# Patient Record
Sex: FEMALE | Race: WHITE | NOT HISPANIC OR LATINO | Employment: UNEMPLOYED | URBAN - METROPOLITAN AREA
[De-identification: names, ages, dates, MRNs, and addresses within clinical notes are randomized per-mention and may not be internally consistent; named-entity substitution may affect disease eponyms.]

---

## 2017-01-03 ENCOUNTER — OFFICE VISIT (OUTPATIENT)
Dept: ORTHOPEDICS | Facility: CLINIC | Age: 47
End: 2017-01-03
Payer: COMMERCIAL

## 2017-01-03 ENCOUNTER — HOSPITAL ENCOUNTER (OUTPATIENT)
Dept: RADIOLOGY | Facility: HOSPITAL | Age: 47
Discharge: HOME | End: 2017-01-03
Attending: ORTHOPAEDIC SURGERY
Payer: COMMERCIAL

## 2017-01-03 VITALS
SYSTOLIC BLOOD PRESSURE: 119 MMHG | BODY MASS INDEX: 21.16 KG/M2 | DIASTOLIC BLOOD PRESSURE: 83 MMHG | WEIGHT: 115 LBS | HEIGHT: 62 IN | HEART RATE: 68 BPM

## 2017-01-03 DIAGNOSIS — M79.671 RIGHT FOOT PAIN: ICD-10-CM

## 2017-01-03 DIAGNOSIS — M79.671 RIGHT FOOT PAIN: Primary | ICD-10-CM

## 2017-01-03 DIAGNOSIS — M76.821 POSTERIOR TIBIAL TENDINITIS OF RIGHT LOWER EXTREMITY: ICD-10-CM

## 2017-01-03 PROCEDURE — 73630 X-RAY EXAM OF FOOT: CPT | Mod: TC,PO,RT

## 2017-01-03 PROCEDURE — 99999 PR PBB SHADOW E&M-EST. PATIENT-LVL III: CPT | Mod: PBBFAC,,, | Performed by: ORTHOPAEDIC SURGERY

## 2017-01-03 PROCEDURE — 73630 X-RAY EXAM OF FOOT: CPT | Mod: 26,RT,, | Performed by: RADIOLOGY

## 2017-01-03 PROCEDURE — 99204 OFFICE O/P NEW MOD 45 MIN: CPT | Mod: S$GLB,,, | Performed by: ORTHOPAEDIC SURGERY

## 2017-01-04 ENCOUNTER — HOSPITAL ENCOUNTER (OUTPATIENT)
Dept: RADIOLOGY | Facility: HOSPITAL | Age: 47
Discharge: HOME | End: 2017-01-04
Attending: ORTHOPAEDIC SURGERY
Payer: COMMERCIAL

## 2017-01-04 DIAGNOSIS — M79.671 RIGHT FOOT PAIN: ICD-10-CM

## 2017-01-04 PROCEDURE — 73721 MRI JNT OF LWR EXTRE W/O DYE: CPT | Mod: TC,PO,RT

## 2017-01-04 PROCEDURE — 73721 MRI JNT OF LWR EXTRE W/O DYE: CPT | Mod: 26,RT,, | Performed by: RADIOLOGY

## 2017-01-04 NOTE — PROGRESS NOTES
HPI: Maria L Smyth is a 46 y.o. female who complains of right foot pain. She notes pain in her right foot in the area of the arch for the past year.  No history of injury or trauma. She does kick boxing and pilates for exercise. She has tried PT, custom orthotics and walking boot. The boot makes the pain worse. She has gotten to the point where she cannot exercise due to pain. Pt denies weakness, numbness, and tingling.       PAST MEDICAL/SURGICAL/FAMILY/SOCIAL/ HISTORY: REVIEWED    ALLERGIES/MEDICATIONS: REVIEWED       Review of Systems:   Answers for HPI/ROS submitted by the patient on 1/3/2017   Leg pain  unexpected weight change: No  appetite change : No  sleep disturbance: No  IMMUNOCOMPROMISED: No  nervous/ anxious: No  dysphoric mood: No  rash: No  visual disturbance: No  eye redness: No  eye pain: No  ear pain: No  tinnitus: No  hearing loss: No  sinus pressure : No  nosebleeds: No  enviro allergies: No  food allergies: Yes  cough: No  shortness of breath: No  sweating: No  dysuria: No  frequency: No  difficulty urinating: No  hematuria: No  painful intercourse: No  chest pain: No  palpitations: No  nausea: No  vomiting: No  diarrhea: No  blood in stool: No  constipation: No  headaches: No  dizziness: No  numbness: No  seizures: No  joint swelling: No  myalgia: No  weakness: No  back pain: No  Pain Chronicity: recurrent  History of trauma: Yes  Onset: more than 1 year ago  Frequency: daily  Progression since onset: waxing and waning  Injury mechanism: jumping  injury location: exercising  pain- numeric: 3/10  pain location: right foot  pain quality: dull  Radiating Pain: No  Aggravating factors: exercise  fever: No  inability to bear weight: Yes  itching: No  joint locking: No  limited range of motion: No  stiffness: No  tingling: Yes  Treatments tried: brace/corset, cold, heat, rest  physical therapy: ineffective  Improvement on treatment: mild      PHYSICAL EXAM:  Vitals:    01/03/17 1340   BP: 119/83  "  Pulse: 68     Ht Readings from Last 1 Encounters:   01/03/17 5' 2" (1.575 m)     Wt Readings from Last 1 Encounters:   01/03/17 52.2 kg (115 lb)         GENERAL: Well developed, well nourished, no acute distress.  SKIN: Skin is intact. No atrophy, abrasions or lesions are noted.   Neurological: Normal mental status. Appropriate and conversant. Alert and oriented x 3.  GAIT: Walks with non-antalgic gait.    Right lower extremity:  2+ dorsalis pedis pulse.  Capillary refill < 3 seconds.  Normal range of motion tibiotalar and subtalar joints. Moderate pes planovalgus.  5/5 strength EHL, FHL, tibialis anterior, gastrocsoleus, tibialis posterior and peroneals. Sensation to light touch intact sural, saphenous, superficial peroneal and deep peroneal nerves. No swelling, ecchymosis or deformity. No lymphadenopathy, no masses or tumors palpated.  She is  tender to palpation at the insertion of the posterior tibial tendon. She is able to perform single heel raise.     Left lower extremity: 2+ dorsalis pedis pulse.  Capillary refill < 3 seconds.  Normal range of motion tibiotalar and subtalar joints. Moderate pes planovalgus.   5/5 strength EHL, FHL, tibialis anterior, gastrocsoleus, tibialis posterior and peroneals. Sensation to light touch intact sural, saphenous, superficial peroneal and deep peroneal nerves. No swelling, ecchymosis or deformity. No lymphadenopathy, no masses or tumors palpated.        XRAYS:   3 views of right foot obtained and reviewed today reveal pes planovalgus. No evidence of fractures or dislocations.       ASSESSMENT:        Encounter Diagnosis   Name Primary?    Posterior tibial tendinitis of right lower extremity         PLAN:  I spent 20 minutes in consulation with the patient today. More than half the time was spent counseling the patient on her condition and the options for operative versus non-operative care.     We performed a custom orthotic/brace adjustment, fitting and training with the " patient today. The patient demonstrated understanding and proper care. This was performed for 15 minutes.  Lace up ankle brace was given.   Rest and elevate the affected painful area.  Apply cold compresses intermittently as needed.  As pain recedes, begin normal activities slowly as tolerated.  Continue PT. F/u 6 weeks. If no improvement will obtain MRI and discuss flatfoot reconstruction in further detail.

## 2017-01-05 ENCOUNTER — TELEPHONE (OUTPATIENT)
Dept: ORTHOPEDICS | Facility: CLINIC | Age: 47
End: 2017-01-05

## 2017-01-05 NOTE — TELEPHONE ENCOUNTER
Spoke to patient and advised that Dr. Alanis reviewed her MRI and stated that she has tendonitis. Advised patient that she may continue to walk and do therapy with her cam walker boot. Patient to follow up at follow up appt. Patient stated understanding.

## 2017-05-08 ENCOUNTER — PATIENT MESSAGE (OUTPATIENT)
Dept: FAMILY MEDICINE | Facility: CLINIC | Age: 47
End: 2017-05-08

## 2017-05-11 RX ORDER — RIZATRIPTAN BENZOATE 10 MG/1
10 TABLET ORAL ONCE AS NEEDED
Qty: 12 TABLET | Refills: 6 | Status: SHIPPED | OUTPATIENT
Start: 2017-05-11 | End: 2018-05-25 | Stop reason: SDUPTHER

## 2017-09-05 ENCOUNTER — PATIENT MESSAGE (OUTPATIENT)
Dept: FAMILY MEDICINE | Facility: CLINIC | Age: 47
End: 2017-09-05

## 2017-09-05 RX ORDER — MONTELUKAST SODIUM 10 MG/1
10 TABLET ORAL NIGHTLY
Qty: 30 TABLET | Refills: 12 | Status: SHIPPED | OUTPATIENT
Start: 2017-09-05 | End: 2018-05-25 | Stop reason: SDUPTHER

## 2017-09-29 ENCOUNTER — OFFICE VISIT (OUTPATIENT)
Dept: OBSTETRICS AND GYNECOLOGY | Facility: CLINIC | Age: 47
End: 2017-09-29
Payer: COMMERCIAL

## 2017-09-29 ENCOUNTER — OFFICE VISIT (OUTPATIENT)
Dept: OPTOMETRY | Facility: CLINIC | Age: 47
End: 2017-09-29
Payer: COMMERCIAL

## 2017-09-29 VITALS
BODY MASS INDEX: 22.28 KG/M2 | DIASTOLIC BLOOD PRESSURE: 60 MMHG | HEIGHT: 62 IN | SYSTOLIC BLOOD PRESSURE: 110 MMHG | WEIGHT: 121.06 LBS

## 2017-09-29 DIAGNOSIS — Z01.00 ENCOUNTER FOR ROUTINE EYE AND VISION EXAMINATION: Primary | ICD-10-CM

## 2017-09-29 DIAGNOSIS — H52.203 MYOPIA WITH ASTIGMATISM AND PRESBYOPIA, BILATERAL: ICD-10-CM

## 2017-09-29 DIAGNOSIS — Z01.419 WELL FEMALE EXAM WITH ROUTINE GYNECOLOGICAL EXAM: Primary | ICD-10-CM

## 2017-09-29 DIAGNOSIS — Z46.0 CONTACT LENS/GLASSES FITTING: Primary | ICD-10-CM

## 2017-09-29 DIAGNOSIS — Z12.39 BREAST CANCER SCREENING: ICD-10-CM

## 2017-09-29 DIAGNOSIS — H52.13 MYOPIA WITH ASTIGMATISM AND PRESBYOPIA, BILATERAL: ICD-10-CM

## 2017-09-29 DIAGNOSIS — Z46.0 CONTACT LENS/GLASSES FITTING: ICD-10-CM

## 2017-09-29 DIAGNOSIS — H52.4 MYOPIA WITH ASTIGMATISM AND PRESBYOPIA, BILATERAL: ICD-10-CM

## 2017-09-29 PROCEDURE — 99999 PR PBB SHADOW E&M-EST. PATIENT-LVL III: CPT | Mod: PBBFAC,,, | Performed by: OBSTETRICS & GYNECOLOGY

## 2017-09-29 PROCEDURE — 99396 PREV VISIT EST AGE 40-64: CPT | Mod: S$GLB,,, | Performed by: OBSTETRICS & GYNECOLOGY

## 2017-09-29 PROCEDURE — 92014 COMPRE OPH EXAM EST PT 1/>: CPT | Mod: S$GLB,,, | Performed by: OPTOMETRIST

## 2017-09-29 PROCEDURE — 99999 PR PBB SHADOW E&M-EST. PATIENT-LVL II: CPT | Mod: PBBFAC,,, | Performed by: OPTOMETRIST

## 2017-09-29 PROCEDURE — 92015 DETERMINE REFRACTIVE STATE: CPT | Mod: S$GLB,,, | Performed by: OPTOMETRIST

## 2017-09-29 PROCEDURE — 99499 UNLISTED E&M SERVICE: CPT | Mod: S$GLB,,, | Performed by: OPTOMETRIST

## 2017-09-29 NOTE — PROGRESS NOTES
SUBJECTIVE:   46 y.o. female   for routine gyn exam. Patient's last menstrual period was 09/15/2017 (approximate)..  She reports painful periods and fatigue.         Past Medical History:   Diagnosis Date    Abnormal Pap smear     pt states that she had a biopsy that resulted WNL.    Abnormal Pap smear of cervix     Allergic conjunctivitis     ALLERGIC RHINITIS     Atopic dermatitis     Food allergy 2012    Immunotherapy     Migraine headache      Past Surgical History:   Procedure Laterality Date    SHOULDER ARTHROSCOPY  2011    right rotator cuff repair    SINUS SURGERY      TONSILLECTOMY  childhood     Social History     Social History    Marital status:      Spouse name: N/A    Number of children: N/A    Years of education: N/A     Occupational History    Not on file.     Social History Main Topics    Smoking status: Never Smoker    Smokeless tobacco: Never Used    Alcohol use Yes      Comment: social    Drug use: No    Sexual activity: Yes     Partners: Male     Birth control/ protection: None, Partner-Vasectomy     Other Topics Concern    Are You Pregnant Or Think You May Be? No    Breast-Feeding No     Social History Narrative    Originally from [place].     Family History   Problem Relation Age of Onset    Allergic rhinitis Son     Allergic rhinitis Son     Asthma Son     Atopy Son     Eczema Son     Cancer Maternal Grandmother     Cancer Maternal Aunt 40     adenoca lung, breast (Non-smoker)    Diabetes Maternal Aunt     Heart disease Mother 60     s/p cabg    Retinitis pigmentosa Mother     Heart disease Father 62     s/p cabg    Cancer Father      bladder ca (+tob)    Diabetes Father      pre-diabetes    Cancer Maternal Grandfather      NHL    Immunodeficiency Neg Hx     Urticaria Neg Hx     Angioedema Neg Hx     Allergies Neg Hx     Rhinitis Neg Hx     Breast cancer Neg Hx     Ovarian cancer Neg Hx     Colon cancer Neg Hx      Melanoma Neg Hx      OB History    Para Term  AB Living   3 3 3     3   SAB TAB Ectopic Multiple Live Births           3      # Outcome Date GA Lbr Clovis/2nd Weight Sex Delivery Anes PTL Lv   3 Term      Vag-Spont  N AMY   2 Term      Vag-Spont  N AMY   1 Term      Vag-Spont  N AMY            Current Outpatient Prescriptions   Medication Sig Dispense Refill    B-complex with vitamin C (Z-BEC OR EQUIV) tablet Take 1 tablet by mouth once daily.      cetirizine (ZYRTEC) 10 MG tablet Take 1 tablet by mouth.      fish oil-omega-3 fatty acids 300-1,000 mg capsule Take 2 g by mouth once daily.      montelukast (SINGULAIR) 10 mg tablet Take 1 tablet (10 mg total) by mouth nightly. Every day 30 tablet 12    QNASL 80 mcg/actuation HFAA       vitamin D 1000 units Tab Take 185 mg by mouth once daily.      epinephrine (EPIPEN) 0.3 mg/0.3 mL (1:1,000) AtIn Inject 0.3 mLs (0.3 mg total) into the muscle once. 2 Device 1    rizatriptan (MAXALT) 10 MG tablet Take 1 tablet (10 mg total) by mouth once as needed for Migraine. 12 tablet 6     No current facility-administered medications for this visit.      Allergies: Macrobid  [nitrofurantoin monohyd/m-cryst]; Corn; Effexor  [venlafaxine]; Eggs [egg derived]; Soy; Wheat; and Doxycycline     ROS:  Constitutional: no weight loss, weight gain, fever,+ fatigue  Eyes:  No vision changes, glasses/contacts  ENT/Mouth: No ulcers, sinus problems, ears ringing, headache  Cardiovascular: No inability to lie flat, chest pain, exercise intolerance, swelling, heart palpitations  Respiratory: No wheezing, coughing blood, shortness of breath, or cough  Gastrointestinal: No diarrhea, bloody stool, nausea/vomiting, constipation, gas, hemorrhoids  Genitourinary: No blood in urine, painful urination, urgency of urination, frequency of urination, incomplete emptying, incontinence, abnormal bleeding, +painful periods, heavy periods, vaginal discharge, vaginal odor, painful intercourse,  "sexual problems, bleeding after intercourse.  Musculoskeletal: No muscle weakness  Skin/Breast: No painful breasts, nipple discharge, masses, rash, ulcers  Neurological: No passing out, seizures, numbness, headache  Endocrine: No diabetes, hypothyroid, hyperthyroid, hot flashes, hair loss, abnormal hair growth, ance  Psychiatric: No depression, crying  Hematologic: No bruises, bleeding, swollen lymph nodes, anemia.      OBJECTIVE:   The patient appears well, alert, oriented x 3, in no distress.  /60 (BP Location: Left arm, Patient Position: Sitting, BP Method: Medium (Automatic))   Ht 5' 2" (1.575 m)   Wt 54.9 kg (121 lb 0.5 oz)   LMP 09/15/2017 (Approximate)   BMI 22.14 kg/m²   NECK: no thyromegaly, trachea midline  SKIN: no acne, striae, hirsutism  CHEST: CTAB  CV: RRR  BREAST EXAM: breasts appear normal, no suspicious masses, no skin or nipple changes or axillary nodes  ABDOMEN: no hernias, masses, or hepatosplenomegaly  GENITALIA: normal external genitalia, no erythema, no discharge  URETHRA: normal urethra, normal urethral meatus  VAGINA: Normal  CERVIX: no lesions or cervical motion tenderness  UTERUS: normal size, contour, position, consistency, mobility, non-tender  ADNEXA: no mass, fullness, tenderness      ASSESSMENT:   1. Well female exam with routine gynecological exam     2. Breast cancer screening  Mammo Digital Screening Bilat with Tomosynthesis CAD       PLAN:   Orders Placed This Encounter    Mammo Digital Screening Bilat with Tomosynthesis CAD     Discussed sx and mgt options.  Return to clinic in 1 year  "

## 2017-09-29 NOTE — PROGRESS NOTES
HPI     Annual Exam    Additional comments: DLE 10-14 (mars)    needs updated specs & clrx           Blurred Vision    Additional comments: slight decrease at both near & distance ---   interested in trying monovision           Comments   Agree above  Notes [facility] seems reduced at near / distance  Readers w/ cl's   No other new issues         Last edited by BARBARA Valadez, OD on 9/29/2017  4:33 PM. (History)        ROS     Positive for: Eyes    Negative for: Constitutional, Gastrointestinal, Neurological, Skin,   Genitourinary, Musculoskeletal, HENT, Endocrine, Cardiovascular,   Respiratory, Psychiatric, Allergic/Imm, Heme/Lymph    Last edited by BARBARA Valadez, OD on 9/29/2017  2:28 PM. (History)        Assessment /Plan     For exam results, see Encounter Report.    Encounter for routine eye and vision examination    Myopia with astigmatism and presbyopia, bilateral    Contact lens/glasses fitting      1. Ocular health appears wnl  Pt defers IOP/ DFE  2. Updated specs rx, gave copy  3. Updated clrx  Stay with Acuvue 1 day lenses  Will trial monovision by wearing lens OD only  Cautions driving w/ mono, charles at night    DAILY WEAR CONTACT LENSES  Continue with Daily Wear of contact lenses, up to all waking hours.  Continue with approved contact lens disinfection / rewetting drops as discussed.  Dispose of lenses daily.  Stop wearing your lenses and call our office if redness, blurred vision, or pain persists more than 12 hours.  We recommend an annual eye exam for contact lens patients.      Discussed and educated patient on current findings /plan.  RTC 1 year, prn if any changes / issues

## 2017-09-29 NOTE — PROGRESS NOTES
Assessment /Plan     For exam results, see Encounter Report.    Contact lens/glasses fitting      NC fitting appt

## 2017-10-26 ENCOUNTER — HOSPITAL ENCOUNTER (OUTPATIENT)
Dept: RADIOLOGY | Facility: HOSPITAL | Age: 47
Discharge: HOME | End: 2017-10-26
Attending: OBSTETRICS & GYNECOLOGY
Payer: COMMERCIAL

## 2017-10-26 DIAGNOSIS — Z12.39 BREAST CANCER SCREENING: ICD-10-CM

## 2017-10-26 PROCEDURE — 77067 SCR MAMMO BI INCL CAD: CPT | Mod: TC

## 2017-10-26 PROCEDURE — 77063 BREAST TOMOSYNTHESIS BI: CPT | Mod: 26,,, | Performed by: RADIOLOGY

## 2017-10-26 PROCEDURE — 77067 SCR MAMMO BI INCL CAD: CPT | Mod: 26,,, | Performed by: RADIOLOGY

## 2018-02-01 ENCOUNTER — TELEPHONE (OUTPATIENT)
Dept: FAMILY MEDICINE | Facility: CLINIC | Age: 48
End: 2018-02-01

## 2018-02-01 RX ORDER — OSELTAMIVIR PHOSPHATE 75 MG/1
75 CAPSULE ORAL DAILY
Qty: 10 CAPSULE | Refills: 0 | Status: SHIPPED | OUTPATIENT
Start: 2018-02-01 | End: 2018-02-11

## 2018-05-25 ENCOUNTER — TELEPHONE (OUTPATIENT)
Dept: ALLERGY | Facility: CLINIC | Age: 48
End: 2018-05-25

## 2018-05-25 RX ORDER — EPINEPHRINE 0.3 MG/.3ML
1 INJECTION, SOLUTION INTRAMUSCULAR ONCE
Qty: 2 EACH | Refills: 1 | Status: SHIPPED | OUTPATIENT
Start: 2018-05-25 | End: 2020-07-02 | Stop reason: SDUPTHER

## 2018-05-25 NOTE — TELEPHONE ENCOUNTER
I have done prescription for Auvi q but please let her know if I am filling any meds for her she needs to be seen annually, has been 4 years, otherwise she can have her PCP fill epi

## 2018-05-28 RX ORDER — MONTELUKAST SODIUM 10 MG/1
10 TABLET ORAL NIGHTLY
Qty: 30 TABLET | Refills: 0 | Status: SHIPPED | OUTPATIENT
Start: 2018-05-28 | End: 2018-09-20 | Stop reason: SDUPTHER

## 2018-05-28 RX ORDER — RIZATRIPTAN BENZOATE 10 MG/1
10 TABLET ORAL ONCE AS NEEDED
Qty: 12 TABLET | Refills: 0 | Status: SHIPPED | OUTPATIENT
Start: 2018-05-28 | End: 2018-11-14 | Stop reason: SDUPTHER

## 2018-08-02 ENCOUNTER — LAB VISIT (OUTPATIENT)
Dept: LAB | Facility: HOSPITAL | Age: 48
End: 2018-08-02
Attending: INTERNAL MEDICINE
Payer: COMMERCIAL

## 2018-08-02 ENCOUNTER — OFFICE VISIT (OUTPATIENT)
Dept: FAMILY MEDICINE | Facility: CLINIC | Age: 48
End: 2018-08-02
Payer: COMMERCIAL

## 2018-08-02 VITALS
SYSTOLIC BLOOD PRESSURE: 122 MMHG | BODY MASS INDEX: 22.82 KG/M2 | WEIGHT: 124 LBS | DIASTOLIC BLOOD PRESSURE: 80 MMHG | HEIGHT: 62 IN | TEMPERATURE: 98 F | HEART RATE: 60 BPM | RESPIRATION RATE: 18 BRPM

## 2018-08-02 DIAGNOSIS — R19.7 DIARRHEA, UNSPECIFIED TYPE: ICD-10-CM

## 2018-08-02 DIAGNOSIS — R19.7 DIARRHEA, UNSPECIFIED TYPE: Primary | ICD-10-CM

## 2018-08-02 DIAGNOSIS — K58.0 IRRITABLE BOWEL SYNDROME WITH DIARRHEA: ICD-10-CM

## 2018-08-02 LAB
C DIFF GDH STL QL: NEGATIVE
C DIFF TOX A+B STL QL IA: NEGATIVE

## 2018-08-02 PROCEDURE — 3008F BODY MASS INDEX DOCD: CPT | Mod: CPTII,S$GLB,, | Performed by: NURSE PRACTITIONER

## 2018-08-02 PROCEDURE — 99214 OFFICE O/P EST MOD 30 MIN: CPT | Mod: S$GLB,,, | Performed by: NURSE PRACTITIONER

## 2018-08-02 PROCEDURE — 87209 SMEAR COMPLEX STAIN: CPT

## 2018-08-02 PROCEDURE — 87449 NOS EACH ORGANISM AG IA: CPT

## 2018-08-02 RX ORDER — DICYCLOMINE HYDROCHLORIDE 20 MG/1
20 TABLET ORAL
Qty: 28 TABLET | Refills: 0 | Status: SHIPPED | OUTPATIENT
Start: 2018-08-02 | End: 2018-09-01

## 2018-08-02 NOTE — PROGRESS NOTES
Subjective:       Patient ID: Maria L Smyth is a 47 y.o. female.    Chief Complaint: Abdominal Pain    HPI Over the past several weeks having Diarrhea, bloating, cramping. Got better then came back. Started eating meat again after not eating for a year. States she was diagnosed a year ago with IBS and she found that red meat was the irritant. States she has been eating fish/chicken only for the past year until a few weeks ago. Started back with small amount of red meat in her diet and shortly after she started with loose stools. States it varies from watery to soft. She has since stopped the red meat again but the diarrhea is persisting. She has  Mild nausea. Fatigue from not eating much. No blood in her stools.  Watery to soft. She is taking probiotics. No other family members sick. Has from 2-5 bowel movements a day, worse if she eats something. She has not noticed any fever. She denies going out of the country or being around anyone that has had C-diff. She denies any other concerns today. See ROS.     The following portion of the patients history was reviewed and updated as appropriate: allergies, current medications, past medical and surgical history. Past social history and problem list reviewed. Family PMH and Past social history reviewed. Tobacco, Illicit drug use reviewed.     Review of Systems   Constitutional: Positive for fatigue. Negative for fever.   HENT: Negative.    Respiratory: Negative for cough, shortness of breath and wheezing.    Cardiovascular: Negative for chest pain, palpitations and leg swelling.   Gastrointestinal: Positive for abdominal pain (cramping  at times.  bloated feeling), diarrhea and nausea. Negative for blood in stool and vomiting.   Genitourinary: Negative for difficulty urinating and flank pain.   Musculoskeletal: Negative.    Neurological: Negative for weakness and headaches.   Hematological: Negative for adenopathy.       Objective:     /80 (BP Location: Left arm,  "Patient Position: Sitting, BP Method: Medium (Manual))   Pulse 60   Temp 97.9 °F (36.6 °C) (Oral)   Resp 18   Ht 5' 2" (1.575 m)   Wt 56.2 kg (124 lb)   LMP 07/19/2018   BMI 22.68 kg/m²      Physical Exam     Constitutional: oriented to person, place, and time. well-developed and well-nourished.   Cardiovascular: Normal rate, regular rhythm and normal heart sounds.    Pulmonary/Chest: Effort normal and breath sounds normal. No respiratory distress. No wheezes.   Abdominal: Soft. Bowel sounds are normal. No distension. There is no tenderness.   Musculoskeletal: Normal range of motion. No edema.   Neurological: oriented to person, place, and time.   Skin: Skin is warm and dry. No rashes or lesions  Psychiatric: Normal mood and affect.Behavior is normal. Judgment and thought content normal.   Assessment:       1. Diarrhea, unspecified type    2. Irritable bowel syndrome with diarrhea        Plan:         Maria L was seen today for abdominal pain.    Diagnoses and all orders for this visit:    Diarrhea, unspecified type: will check stool samples.   -     Clostridium difficile EIA; Future  -     Stool Exam-Ova,Cysts,Parasites; Future    Irritable bowel syndrome with diarrhea: continue the probiotics, follow bland diet. Take bentyl for the next week. Will follow up with GI clinic if needed.     Other orders  -     dicyclomine (BENTYL) 20 mg tablet; Take 1 tablet (20 mg total) by mouth 4 (four) times daily before meals and nightly.    Continue current medication  Take medications only as prescribed  Healthy diet, exercise  Adequate rest  Adequate hydration  Avoid allergens  Avoid excessive caffeine  "

## 2018-08-03 LAB — O+P STL TRI STN: NORMAL

## 2018-08-14 ENCOUNTER — OFFICE VISIT (OUTPATIENT)
Dept: FAMILY MEDICINE | Facility: CLINIC | Age: 48
End: 2018-08-14
Payer: COMMERCIAL

## 2018-08-14 VITALS
WEIGHT: 124.63 LBS | OXYGEN SATURATION: 99 % | SYSTOLIC BLOOD PRESSURE: 120 MMHG | DIASTOLIC BLOOD PRESSURE: 66 MMHG | TEMPERATURE: 98 F | HEIGHT: 62 IN | HEART RATE: 62 BPM | BODY MASS INDEX: 22.93 KG/M2

## 2018-08-14 DIAGNOSIS — Z00.00 WELL ADULT EXAM: Primary | ICD-10-CM

## 2018-08-14 DIAGNOSIS — G43.009 MIGRAINE WITHOUT AURA AND WITHOUT STATUS MIGRAINOSUS, NOT INTRACTABLE: ICD-10-CM

## 2018-08-14 DIAGNOSIS — Z12.31 ENCOUNTER FOR SCREENING MAMMOGRAM FOR MALIGNANT NEOPLASM OF BREAST: ICD-10-CM

## 2018-08-14 DIAGNOSIS — E55.9 VITAMIN D DEFICIENCY: ICD-10-CM

## 2018-08-14 DIAGNOSIS — Z91.018 FOOD ALLERGY: ICD-10-CM

## 2018-08-14 DIAGNOSIS — R19.7 DIARRHEA, UNSPECIFIED TYPE: ICD-10-CM

## 2018-08-14 PROCEDURE — 99396 PREV VISIT EST AGE 40-64: CPT | Mod: S$GLB,,, | Performed by: INTERNAL MEDICINE

## 2018-08-14 NOTE — PROGRESS NOTES
Subjective:       Patient ID: Maria L Smyth is a 47 y.o. female.    Medication List with Changes/Refills   Current Medications    AUVI-Q 0.3 MG/0.3 ML ATIN    Inject 0.3 mLs (0.3 mg total) into the muscle once.    B-COMPLEX WITH VITAMIN C (Z-BEC OR EQUIV) TABLET    Take 1 tablet by mouth once daily.    CETIRIZINE (ZYRTEC) 10 MG TABLET    Take 1 tablet by mouth.    DICYCLOMINE (BENTYL) 20 MG TABLET    Take 1 tablet (20 mg total) by mouth 4 (four) times daily before meals and nightly.    FISH OIL-OMEGA-3 FATTY ACIDS 300-1,000 MG CAPSULE    Take 2 g by mouth once daily.    MONTELUKAST (SINGULAIR) 10 MG TABLET    Take 1 tablet (10 mg total) by mouth nightly    QNASL 80 MCG/ACTUATION HFAA        RIZATRIPTAN (MAXALT) 10 MG TABLET    Take 1 tablet (10 mg total) by mouth once as needed for Migraine.    VITAMIN D 1000 UNITS TAB    Take 185 mg by mouth once daily.       Chief Complaint: Annual Exam  She is here today for her wellness exam.      On 8/2/2018 she was seen for viral gastroenteritis with diarrhea and abdominal pain.  SHe says she is feeling better but continues with some loose stools.  She says some foods make this worse and she is trying to avoid. No vomiting. No nausea. No weight loss. No blood in stool. Abdominal pain is resolved but she has some mild bloating.     She has chronic environmental allergies that are controlled on nasal steroids, singulair and antihistamines.  She denies any active symptoms.      She has multiple food allergies and is on a very strict diet. She is now a vegetarian which has helped a lot with her chronic GI discomfort.  She is taking vitamin supplementation and probiotics.  She is no longer having reflux symptoms.     She has migraines that are triggered by sulfites. As long as she avoids dried fruit and red wine she can avoid her migraines. If she has a migraine she has good response to maxalt.     She eats healthy and exercises regularly.     Pap---4/2016 neg  Mammogram  "----10/2017  neg  Tdap----2/2016  Influenza vaccine-----annually    Review of Systems   Constitutional: Negative for appetite change, fatigue, fever and unexpected weight change.   HENT: Negative for congestion, ear pain, hearing loss, sore throat and trouble swallowing.    Eyes: Negative for pain and visual disturbance.   Respiratory: Negative for cough, chest tightness, shortness of breath and wheezing.    Cardiovascular: Negative for chest pain, palpitations and leg swelling.   Gastrointestinal: Positive for abdominal distention and diarrhea. Negative for abdominal pain, blood in stool, constipation, nausea and vomiting.   Endocrine: Negative for polyuria.   Genitourinary: Negative for dysuria and hematuria.   Musculoskeletal: Negative for arthralgias, back pain and myalgias.   Skin: Negative for rash.   Neurological: Negative for dizziness, weakness, numbness and headaches.   Hematological: Does not bruise/bleed easily.   Psychiatric/Behavioral: Negative for dysphoric mood, sleep disturbance and suicidal ideas. The patient is not nervous/anxious.        Objective:      Vitals:    08/14/18 1056   BP: 120/66   Pulse: 62   Temp: 98 °F (36.7 °C)   TempSrc: Oral   SpO2: 99%   Weight: 56.5 kg (124 lb 9.6 oz)   Height: 5' 2" (1.575 m)     Body mass index is 22.79 kg/m².  Physical Exam    General appearance: No acute distress, cooperative  Eyes: PERRL, EOMI, conjunctiva clear  Ears: normal external ear and pinna, tm clear without drainage, canals clear  Nose: Normal mucosa without drainage  Throat: no exudates or erythema, tonsils not enlarged  Mouth: no sores or lesions, moist mucous membranes, good dentition  Neck: FROM, soft, supple, no thyromegaly, no bruits  Lymph: no anterior or posterior cervical adenopathy  Heart::  Regular rate and rhythm, no murmur  Lung: Clear to ascultation bilaterally, no wheezing, no rales, no rhonchi, no distress  Abdomen: Soft, nontender, no distention, no hepatosplenomegaly, bowel " sounds normal, no guarding, no rebound, no peritoneal signs  Skin: no rashes, no lesions  Extremities: no edema, no cyanosis  Neuro: CN 2-12 intact, 5/5 muscle strength upper and lower extremity bilaterally, 2+ DTRs UE and LE bilaterally, normal gait, normal sensation  Peripheral pulses: 2+ pedal pulses bilaterally, good perfusion and color  Musculoskeletal: FROM, good strenth, no tenderness  Joint: normal appearance, no swelling, no warmth, no deformity in all joints    Assessment:       1. Well adult exam    2. Diarrhea, unspecified type    3. Vitamin D deficiency    4. Food allergy    5. Migraine without aura and without status migrainosus, not intractable    6. Encounter for screening mammogram for malignant neoplasm of breast        Plan:       Well adult exam  She is doing very well. She is due for a mammogram and labs.    -     Lipid panel; Future; Expected date: 08/14/2018  -     CBC auto differential; Future; Expected date: 08/14/2018  -     Comprehensive metabolic panel; Future; Expected date: 08/14/2018  -     TSH; Future; Expected date: 08/14/2018    Diarrhea, unspecified type  Reassurance and supportive care. I suspect due to villous atrophy after gastroenteritis. I advised to avoid lactose products for another couple weeks.      Vitamin D deficiency  Noted on previous labs and will recheck.   -     Vitamin D; Future; Expected date: 08/14/2018    Food allergy  She is very careful with her diet.  She is able to avoid eczema and allergy symptoms with avoidance.     Migraine without aura and without status migrainosus, not intractable  Controlled by avoiding her triggers.     Encounter for screening mammogram for malignant neoplasm of breast  -     Mammo Digital Screening Bilat with CAD; Future; Expected date: 08/14/2018    Follow-up in about 1 year (around 8/14/2019) for annual exam.

## 2018-08-16 ENCOUNTER — LAB VISIT (OUTPATIENT)
Dept: LAB | Facility: HOSPITAL | Age: 48
End: 2018-08-16
Attending: INTERNAL MEDICINE
Payer: COMMERCIAL

## 2018-08-16 DIAGNOSIS — E55.9 VITAMIN D DEFICIENCY: ICD-10-CM

## 2018-08-16 DIAGNOSIS — Z00.00 WELL ADULT EXAM: ICD-10-CM

## 2018-08-16 LAB
25(OH)D3+25(OH)D2 SERPL-MCNC: 19 NG/ML
ALBUMIN SERPL BCP-MCNC: 4 G/DL
ALP SERPL-CCNC: 53 U/L
ALT SERPL W/O P-5'-P-CCNC: 29 U/L
ANION GAP SERPL CALC-SCNC: 9 MMOL/L
AST SERPL-CCNC: 24 U/L
BASOPHILS # BLD AUTO: 0.04 K/UL
BASOPHILS NFR BLD: 1.1 %
BILIRUB SERPL-MCNC: 0.8 MG/DL
BUN SERPL-MCNC: 11 MG/DL
CALCIUM SERPL-MCNC: 9.7 MG/DL
CHLORIDE SERPL-SCNC: 108 MMOL/L
CHOLEST SERPL-MCNC: 255 MG/DL
CHOLEST/HDLC SERPL: 3 {RATIO}
CO2 SERPL-SCNC: 22 MMOL/L
CREAT SERPL-MCNC: 0.8 MG/DL
DIFFERENTIAL METHOD: ABNORMAL
EOSINOPHIL # BLD AUTO: 0.2 K/UL
EOSINOPHIL NFR BLD: 5.5 %
ERYTHROCYTE [DISTWIDTH] IN BLOOD BY AUTOMATED COUNT: 13.1 %
EST. GFR  (AFRICAN AMERICAN): >60 ML/MIN/1.73 M^2
EST. GFR  (NON AFRICAN AMERICAN): >60 ML/MIN/1.73 M^2
GLUCOSE SERPL-MCNC: 93 MG/DL
HCT VFR BLD AUTO: 43.2 %
HDLC SERPL-MCNC: 86 MG/DL
HDLC SERPL: 33.7 %
HGB BLD-MCNC: 14.2 G/DL
IMM GRANULOCYTES # BLD AUTO: 0.01 K/UL
IMM GRANULOCYTES NFR BLD AUTO: 0.3 %
LDLC SERPL CALC-MCNC: 151.2 MG/DL
LYMPHOCYTES # BLD AUTO: 1.7 K/UL
LYMPHOCYTES NFR BLD: 46.3 %
MCH RBC QN AUTO: 30.5 PG
MCHC RBC AUTO-ENTMCNC: 32.9 G/DL
MCV RBC AUTO: 93 FL
MONOCYTES # BLD AUTO: 0.4 K/UL
MONOCYTES NFR BLD: 10.5 %
NEUTROPHILS # BLD AUTO: 1.3 K/UL
NEUTROPHILS NFR BLD: 36.3 %
NONHDLC SERPL-MCNC: 169 MG/DL
NRBC BLD-RTO: 0 /100 WBC
PLATELET # BLD AUTO: 288 K/UL
PMV BLD AUTO: 10.1 FL
POTASSIUM SERPL-SCNC: 4.7 MMOL/L
PROT SERPL-MCNC: 7.1 G/DL
RBC # BLD AUTO: 4.66 M/UL
SODIUM SERPL-SCNC: 139 MMOL/L
TRIGL SERPL-MCNC: 89 MG/DL
TSH SERPL DL<=0.005 MIU/L-ACNC: 1.38 UIU/ML
WBC # BLD AUTO: 3.63 K/UL

## 2018-08-16 PROCEDURE — 84443 ASSAY THYROID STIM HORMONE: CPT

## 2018-08-16 PROCEDURE — 80061 LIPID PANEL: CPT

## 2018-08-16 PROCEDURE — 85025 COMPLETE CBC W/AUTO DIFF WBC: CPT

## 2018-08-16 PROCEDURE — 80053 COMPREHEN METABOLIC PANEL: CPT

## 2018-08-16 PROCEDURE — 36415 COLL VENOUS BLD VENIPUNCTURE: CPT | Mod: PO

## 2018-08-16 PROCEDURE — 82306 VITAMIN D 25 HYDROXY: CPT

## 2018-08-23 ENCOUNTER — TELEPHONE (OUTPATIENT)
Dept: FAMILY MEDICINE | Facility: CLINIC | Age: 48
End: 2018-08-23

## 2018-08-23 DIAGNOSIS — D70.9 NEUTROPENIA, UNSPECIFIED TYPE: Primary | ICD-10-CM

## 2018-08-23 RX ORDER — ERGOCALCIFEROL 1.25 MG/1
50000 CAPSULE ORAL
Qty: 12 CAPSULE | Refills: 3 | Status: SHIPPED | OUTPATIENT
Start: 2018-08-23

## 2018-08-23 NOTE — TELEPHONE ENCOUNTER
Called her with lab results.     Low WBC but recent GI illness. Will recheck in 1-2 months.     Noted to have low vitamin D and will start high dose supplementation.

## 2018-09-20 RX ORDER — MONTELUKAST SODIUM 10 MG/1
10 TABLET ORAL NIGHTLY
Qty: 90 TABLET | Refills: 3 | Status: SHIPPED | OUTPATIENT
Start: 2018-09-20 | End: 2020-07-01 | Stop reason: SDUPTHER

## 2018-10-16 ENCOUNTER — OFFICE VISIT (OUTPATIENT)
Dept: FAMILY MEDICINE | Facility: CLINIC | Age: 48
End: 2018-10-16
Payer: COMMERCIAL

## 2018-10-16 ENCOUNTER — HOSPITAL ENCOUNTER (OUTPATIENT)
Dept: RADIOLOGY | Facility: HOSPITAL | Age: 48
Discharge: HOME | End: 2018-10-16
Attending: INTERNAL MEDICINE
Payer: COMMERCIAL

## 2018-10-16 VITALS
OXYGEN SATURATION: 98 % | SYSTOLIC BLOOD PRESSURE: 122 MMHG | HEIGHT: 62 IN | TEMPERATURE: 98 F | WEIGHT: 123 LBS | DIASTOLIC BLOOD PRESSURE: 82 MMHG | RESPIRATION RATE: 16 BRPM | BODY MASS INDEX: 22.63 KG/M2 | HEART RATE: 62 BPM

## 2018-10-16 DIAGNOSIS — G89.29 CHRONIC RIGHT-SIDED LOW BACK PAIN WITHOUT SCIATICA: Primary | ICD-10-CM

## 2018-10-16 DIAGNOSIS — G89.29 CHRONIC RIGHT-SIDED LOW BACK PAIN WITHOUT SCIATICA: ICD-10-CM

## 2018-10-16 DIAGNOSIS — M54.50 CHRONIC RIGHT-SIDED LOW BACK PAIN WITHOUT SCIATICA: ICD-10-CM

## 2018-10-16 DIAGNOSIS — D70.9 NEUTROPENIA, UNSPECIFIED TYPE: ICD-10-CM

## 2018-10-16 DIAGNOSIS — M54.50 CHRONIC RIGHT-SIDED LOW BACK PAIN WITHOUT SCIATICA: Primary | ICD-10-CM

## 2018-10-16 PROCEDURE — 72110 X-RAY EXAM L-2 SPINE 4/>VWS: CPT | Mod: TC,FY,PO

## 2018-10-16 PROCEDURE — 72110 X-RAY EXAM L-2 SPINE 4/>VWS: CPT | Mod: 26,,, | Performed by: RADIOLOGY

## 2018-10-16 PROCEDURE — 3008F BODY MASS INDEX DOCD: CPT | Mod: CPTII,S$GLB,, | Performed by: INTERNAL MEDICINE

## 2018-10-16 PROCEDURE — 99213 OFFICE O/P EST LOW 20 MIN: CPT | Mod: S$GLB,,, | Performed by: INTERNAL MEDICINE

## 2018-10-16 NOTE — PROGRESS NOTES
Subjective:       Patient ID: Maria L Smyth is a 47 y.o. female.       Medication List           Accurate as of 10/16/18  4:08 PM. If you have any questions, ask your nurse or doctor.               CONTINUE taking these medications    AUVI-Q 0.3 mg/0.3 mL Atin  Generic drug:  EPINEPHrine  Inject 0.3 mLs (0.3 mg total) into the muscle once.     B-complex with vitamin C tablet  Commonly known as:  Z-Bec or Equiv     fish oil-omega-3 fatty acids 300-1,000 mg capsule     montelukast 10 mg tablet  Commonly known as:  SINGULAIR  Take 1 tablet (10 mg total) by mouth nightly     rizatriptan 10 MG tablet  Commonly known as:  MAXALT  Take 1 tablet (10 mg total) by mouth once as needed for Migraine.     VITAMIN D2 50,000 unit Cap  Generic drug:  ergocalciferol  Take 1 capsule (50,000 Units total) by mouth every 7 days.     ZyrTEC 10 MG tablet  Generic drug:  cetirizine        STOP taking these medications    QNASL 80 mcg/actuation Hfaa  Generic drug:  beclomethasone dipropionate  Stopped by:  Latrice Deutsch DO     vitamin D 1000 units Tab  Commonly known as:  VITAMIN D3  Stopped by:  Latrice Deutsch DO            Chief Complaint: Hip Pain (right hip  x 3 mths / worse in the am / off and on )  She complains of continued pain in right low back.  Pains started about 3 months ago and is not improving.  No radiation.  Pain wakes her up at night when she sleeps. Worse in am where she feels achy pain and is stiff. Better with movement. She says pain is 2-7/10.  No movement make it worse.  She took ibuprofen this am and pain is mildly improved.  SHe continues to have pain with exercise.  Pain is present most days when she wakes.  No other joint pain. No fevers. No changes in bowels. No history of falls or injury.  Pains is described as deep and achy.     Review of Systems   Constitutional: Positive for activity change. Negative for appetite change, chills, fatigue, fever and unexpected weight change.   HENT: Negative for  "congestion, ear discharge, ear pain, hearing loss, mouth sores, postnasal drip, rhinorrhea, sinus pressure, sore throat and trouble swallowing.    Eyes: Negative for pain, discharge, redness and visual disturbance.   Respiratory: Negative for cough, chest tightness, shortness of breath and wheezing.    Cardiovascular: Negative for chest pain and palpitations.   Gastrointestinal: Negative for abdominal pain, blood in stool, constipation, diarrhea, nausea and vomiting.   Endocrine: Negative for polydipsia and polyuria.   Genitourinary: Negative for difficulty urinating, dysuria, hematuria and menstrual problem.   Musculoskeletal: Positive for arthralgias and back pain. Negative for joint swelling, neck pain and neck stiffness.   Skin: Negative for rash.   Neurological: Negative for weakness and headaches.   Hematological: Negative for adenopathy.   Psychiatric/Behavioral: Negative for confusion and dysphoric mood.       Objective:      Vitals:    10/16/18 1200   BP: 122/82   Pulse: 62   Resp: 16   Temp: 97.9 °F (36.6 °C)   TempSrc: Oral   SpO2: 98%   Weight: 55.8 kg (123 lb)   Height: 5' 2" (1.575 m)     Body mass index is 22.5 kg/m².  Physical Exam    General appearance: No acute distress, cooperative  Neck: FROM, soft, supple  Lymph: no anterior or posterior cervical adenopathy  Heart::  Regular rate and rhythm, no murmur  Lung: Clear to ascultation bilaterally, no wheezing, no rales, no rhonchi, no distress  Abdomen: Soft, nontender, no distention, no hepatosplenomegaly, bowel sounds normal, no guarding, no rebound, no peritoneal signs  Skin: no rashes, no lesions  Extremities: no edema, no cyanosis  Neuro: no focal abnormalities, strength 5/5 b/l UE and LE, 2+ DTRs b/l UE and LE, normal gait  Peripheral pulses: 2+ pedal pulses bilaterally, good perfusion and color  Musculoskeletal: FROM, good strenth, no tenderness on palpation  Joint: normal appearance, no swelling, no warmth, no deformity in all " joints    Assessment:       1. Chronic right-sided low back pain without sciatica    2. Neutropenia, unspecified type        Plan:       Chronic right-sided low back pain without sciatica  Concerning for continued pain.  Will check xrays of lumbar spine and depending on the results will do further testing. REferral to PT for evaluation and help with pain.   -     X-Ray Lumbar Spine Complete 5 View; Future; Expected date: 10/16/2018  -     Ambulatory consult to Physical Therapy    Neutropenia, unspecified type  Noted be low in the past and will recheck today.   -     CBC auto differential; Future; Expected date: 10/16/2018    Follow-up for will call with results.

## 2018-10-19 ENCOUNTER — TELEPHONE (OUTPATIENT)
Dept: FAMILY MEDICINE | Facility: CLINIC | Age: 48
End: 2018-10-19

## 2018-10-19 NOTE — TELEPHONE ENCOUNTER
Called with xray and normal CBC.    PT for 2-3 weeks and if still with pain then referral to orthopedics.

## 2018-11-14 RX ORDER — RIZATRIPTAN BENZOATE 10 MG/1
10 TABLET ORAL ONCE AS NEEDED
Qty: 12 TABLET | Refills: 11 | Status: SHIPPED | OUTPATIENT
Start: 2018-11-14 | End: 2019-11-26 | Stop reason: SDUPTHER

## 2019-01-17 ENCOUNTER — OFFICE VISIT (OUTPATIENT)
Dept: DERMATOLOGY | Facility: CLINIC | Age: 49
End: 2019-01-17
Payer: COMMERCIAL

## 2019-01-17 DIAGNOSIS — L57.8 DIFFUSE PHOTODAMAGE OF SKIN: Primary | ICD-10-CM

## 2019-01-17 DIAGNOSIS — D22.9 MULTIPLE BENIGN NEVI: ICD-10-CM

## 2019-01-17 DIAGNOSIS — L82.1 SEBORRHEIC KERATOSES: ICD-10-CM

## 2019-01-17 DIAGNOSIS — Z12.83 SCREENING EXAM FOR SKIN CANCER: ICD-10-CM

## 2019-01-17 PROCEDURE — 99202 PR OFFICE/OUTPT VISIT, NEW, LEVL II, 15-29 MIN: ICD-10-PCS | Mod: S$GLB,,, | Performed by: DERMATOLOGY

## 2019-01-17 PROCEDURE — 99999 PR PBB SHADOW E&M-EST. PATIENT-LVL II: CPT | Mod: PBBFAC,,, | Performed by: DERMATOLOGY

## 2019-01-17 PROCEDURE — 99999 PR PBB SHADOW E&M-EST. PATIENT-LVL II: ICD-10-PCS | Mod: PBBFAC,,, | Performed by: DERMATOLOGY

## 2019-01-17 PROCEDURE — 99202 OFFICE O/P NEW SF 15 MIN: CPT | Mod: S$GLB,,, | Performed by: DERMATOLOGY

## 2019-01-17 RX ORDER — TRETINOIN 0.5 MG/G
CREAM TOPICAL NIGHTLY
Qty: 20 G | Refills: 3 | Status: SHIPPED | OUTPATIENT
Start: 2019-01-17 | End: 2023-05-08 | Stop reason: SDUPTHER

## 2019-01-17 NOTE — LETTER
January 17, 2019      Latrice Deutsch DO  [address] [address]  [address]  [facility] [address]           [facility]  [address] [facility] [address] [place] [address]  Phone: [phone]  Fax: [phone]          Patient: Maria L Smyth   MR Number: [id number]   YOB: 1970   Date of Visit: 1/17/2019       Dear Dr. Latrice Deutsch:    Thank you for referring Maria L Smyth to me for evaluation. Attached you will find relevant portions of my assessment and plan of care.    If you have questions, please do not hesitate to call me. I look forward to following Maria L Smyth along with you.    Sincerely,    Karen Woodruff MD    Enclosure  CC:  No Recipients    If you would like to receive this communication electronically, please contact [email or web address] or [phone] to request more information on [facility] Link access.    For providers and/or their staff who would like to refer a patient to [facility], please contact us through our one-stop-shop provider referral line, [facility] , at [phone].    If you feel you have received this communication in error or would no longer like to receive these types of communications, please e-mail [email or web address]

## 2019-01-17 NOTE — PROGRESS NOTES
Subjective:       Patient ID:  Maria L Smyth is a 48 y.o. female who presents for   Chief Complaint   Patient presents with    Skin Check     48yr old female presents today for skin check waste up,denies change in lesion shape, size, color and symptoms.         Denies hx of skin cancer  Denies fx hx of melanoma    Past Medical History:  2011: Abnormal Pap smear      Comment:  pt states that she had a biopsy that resulted WNL.  No date: Abnormal Pap smear of cervix  No date: Allergic conjunctivitis  No date: ALLERGIC RHINITIS  No date: Atopic dermatitis  9/24/2012: Food allergy  No date: Immunotherapy  No date: Migraine headache          Review of Systems   Skin: Positive for daily sunscreen use and activity-related sunscreen use. Negative for itching, rash and dry skin.        Objective:    Physical Exam   Skin:   Areas Examined (abnormalities noted in diagram):   Head / Face Inspection Performed  Neck Inspection Performed  Chest / Axilla Inspection Performed  Abdomen Inspection Performed  RUE Inspected  LUE Inspection Performed                       Diagram Legend     Erythematous scaling macule/papule c/w actinic keratosis       Vascular papule c/w angioma      Pigmented verrucoid papule/plaque c/w seborrheic keratosis      Yellow umbilicated papule c/w sebaceous hyperplasia      Irregularly shaped tan macule c/w lentigo     1-2 mm smooth white papules consistent with Milia      Movable subcutaneous cyst with punctum c/w epidermal inclusion cyst      Subcutaneous movable cyst c/w pilar cyst      Firm pink to brown papule c/w dermatofibroma      Pedunculated fleshy papule(s) c/w skin tag(s)      Evenly pigmented macule c/w junctional nevus     Mildly variegated pigmented, slightly irregular-bordered macule c/w mildly atypical nevus      Flesh colored to evenly pigmented papule c/w intradermal nevus       Pink pearly papule/plaque c/w basal cell carcinoma      Erythematous hyperkeratotic cursted plaque c/w SCC       Surgical scar with no sign of skin cancer recurrence      Open and closed comedones      Inflammatory papules and pustules      Verrucoid papule consistent consistent with wart     Erythematous eczematous patches and plaques     Dystrophic onycholytic nail with subungual debris c/w onychomycosis     Umbilicated papule    Erythematous-base heme-crusted tan verrucoid plaque consistent with inflamed seborrheic keratosis     Erythematous Silvery Scaling Plaque c/w Psoriasis     See annotation      Assessment / Plan:          Multiple benign nevi  Upper body skin examination performed today including at least 6 points as noted in physical examination. No lesions suspicious for malignancy noted.  Reassurance provided.  Instructed patient to observe lesion(s) for changes and follow up in clinic if changes are noted. Discussed ABCDE's of moles and brochure provided.    Seborrheic keratoses  These are benign inherited growths without a malignant potential. Reassurance given to patient. No treatment is necessary.     Screening exam for skin cancer and Diffuse photodamage of skin  Upper body skin examination performed today including at least 6 points as noted in physical examination. No lesions suspicious for malignancy noted.  - The signs and symptoms of skin cancer were reviewed and the patient was advised to practice sun protection and sun avoidance, use daily sunscreen, and perform regular self skin exams.   -     tretinoin (RETIN-A) 0.05 % cream; Apply topically every evening.  Dispense: 20 g; Refill: 3           Follow-up if symptoms worsen or fail to improve.

## 2019-01-22 ENCOUNTER — OFFICE VISIT (OUTPATIENT)
Dept: FAMILY MEDICINE | Facility: CLINIC | Age: 49
End: 2019-01-22
Payer: COMMERCIAL

## 2019-01-22 ENCOUNTER — PATIENT MESSAGE (OUTPATIENT)
Dept: DERMATOLOGY | Facility: CLINIC | Age: 49
End: 2019-01-22

## 2019-01-22 VITALS
TEMPERATURE: 98 F | BODY MASS INDEX: 23.55 KG/M2 | DIASTOLIC BLOOD PRESSURE: 70 MMHG | HEART RATE: 78 BPM | WEIGHT: 128 LBS | HEIGHT: 62 IN | RESPIRATION RATE: 18 BRPM | SYSTOLIC BLOOD PRESSURE: 120 MMHG

## 2019-01-22 DIAGNOSIS — R53.83 FATIGUE, UNSPECIFIED TYPE: Primary | ICD-10-CM

## 2019-01-22 DIAGNOSIS — F41.9 ANXIETY: ICD-10-CM

## 2019-01-22 DIAGNOSIS — L65.9 HAIR LOSS: ICD-10-CM

## 2019-01-22 DIAGNOSIS — Z12.31 ENCOUNTER FOR SCREENING MAMMOGRAM FOR MALIGNANT NEOPLASM OF BREAST: ICD-10-CM

## 2019-01-22 DIAGNOSIS — R63.5 WEIGHT GAIN: ICD-10-CM

## 2019-01-22 DIAGNOSIS — E55.9 VITAMIN D DEFICIENCY: ICD-10-CM

## 2019-01-22 PROCEDURE — 99213 OFFICE O/P EST LOW 20 MIN: CPT | Mod: S$GLB,,, | Performed by: INTERNAL MEDICINE

## 2019-01-22 PROCEDURE — 99213 PR OFFICE/OUTPT VISIT, EST, LEVL III, 20-29 MIN: ICD-10-PCS | Mod: S$GLB,,, | Performed by: INTERNAL MEDICINE

## 2019-01-22 PROCEDURE — 3008F BODY MASS INDEX DOCD: CPT | Mod: CPTII,S$GLB,, | Performed by: INTERNAL MEDICINE

## 2019-01-22 PROCEDURE — 3008F PR BODY MASS INDEX (BMI) DOCUMENTED: ICD-10-PCS | Mod: CPTII,S$GLB,, | Performed by: INTERNAL MEDICINE

## 2019-01-22 NOTE — PROGRESS NOTES
Subjective:       Patient ID: Maria L Smyth is a 48 y.o. female.       Medication List           Accurate as of 1/22/19  2:52 PM. If you have any questions, ask your nurse or doctor.               CONTINUE taking these medications    AUVI-Q 0.3 mg/0.3 mL Atin  Generic drug:  EPINEPHrine  Inject 0.3 mLs (0.3 mg total) into the muscle once.     B-complex with vitamin C tablet  Commonly known as:  Z-Bec or Equiv     fish oil-omega-3 fatty acids 300-1,000 mg capsule     montelukast 10 mg tablet  Commonly known as:  SINGULAIR  Take 1 tablet (10 mg total) by mouth nightly     rizatriptan 10 MG tablet  Commonly known as:  MAXALT  Take 1 tablet (10 mg total) by mouth once as needed for Migraine.     tretinoin 0.05 % cream  Commonly known as:  RETIN-A  Apply topically every evening.     VITAMIN D2 50,000 unit Cap  Generic drug:  ergocalciferol  Take 1 capsule (50,000 Units total) by mouth every 7 days.     ZyrTEC 10 MG tablet  Generic drug:  cetirizine            Chief Complaint: Wt gain (Symptoms for about 5 months, getting worse); Fatigue; and Dhillon  She presents with weight gain over this last year with decreasing muscle mass. She has increased fatigue and increased anxiety. She is very dhillon and easily agitated.  She has noticed some hair loss.  She is exercising regularly and eating healthy. She is having regular periods but they are lighter. Last on 1/17/2019.  She is concerned about her hormones.  No GI symptoms.  No headaches.  No recent infection or illness. No change in medication.  She does have the diagnosis of gluten intolerance and multiple food allergies.      Review of Systems   Constitutional: Positive for unexpected weight change. Negative for appetite change, fatigue and fever.   HENT: Negative for congestion, ear pain, hearing loss, sore throat and trouble swallowing.    Eyes: Negative for pain and visual disturbance.   Respiratory: Negative for cough, chest tightness, shortness of breath and wheezing.   "  Cardiovascular: Negative for chest pain, palpitations and leg swelling.   Gastrointestinal: Negative for abdominal pain, blood in stool, constipation, diarrhea, nausea and vomiting.   Endocrine: Negative for polyuria.   Genitourinary: Negative for dysuria and hematuria.   Musculoskeletal: Negative for arthralgias, back pain and myalgias.   Skin: Negative for rash.   Neurological: Negative for dizziness, weakness, numbness and headaches.   Hematological: Does not bruise/bleed easily.   Psychiatric/Behavioral: Negative for dysphoric mood, sleep disturbance and suicidal ideas. The patient is nervous/anxious.        Objective:      Vitals:    01/22/19 1215   BP: 120/70   Pulse: 78   Resp: 18   Temp: 97.8 °F (36.6 °C)   TempSrc: Oral   Weight: 58.1 kg (128 lb)   Height: 5' 2" (1.575 m)     Body mass index is 23.41 kg/m².  Physical Exam    General appearance: No acute distress, cooperative  Neck: FROM, soft, supple, no thyromegaly, no bruits  Lymph: no anterior or posterior cervical adenopathy  Heart::  Regular rate and rhythm, no murmur  Lung: Clear to ascultation bilaterally, no wheezing, no rales, no rhonchi, no distress  Abdomen: Soft, nontender, no distention, no hepatosplenomegaly, bowel sounds normal, no guarding, no rebound, no peritoneal signs  Skin: no rashes, no lesions  Extremities: no edema, no cyanosis  Neuro: no focal abnormalities, strength 5/5 b/l UE and LE, 2+ DTRs b/l UE and LE, normal gait  Peripheral pulses: 2+ pedal pulses bilaterally, good perfusion and color    Assessment:       1. Fatigue, unspecified type    2. Weight gain    3. Hair loss    4. Anxiety    5. Vitamin D deficiency    6. Encounter for screening mammogram for malignant neoplasm of breast        Plan:       Fatigue, unspecified type with Weight gain and hair loss  No evidence of menopause with normal periods.  Will check thyroid and blood counts.  Reassurance given.  No concerning signs on exam.    -     TSH; Future; Expected date: " 01/22/2019  -     T3; Future; Expected date: 01/22/2019  -     T4, free; Future; Expected date: 01/22/2019  -     CBC auto differential; Future; Expected date: 01/22/2019  -     Comprehensive metabolic panel; Future; Expected date: 01/22/2019    Anxiety  Reassurance and supportive care.  She does not want any treatment. Recommended counseling and meditation to help with anxiety.     Vitamin D deficiency  She is taking replacement and will recheck level.   -     Vitamin D; Future; Expected date: 01/22/2019    Encounter for screening mammogram for malignant neoplasm of breast  -     Mammo Digital Screening Bilat; Future; Expected date: 01/22/2019    Follow-up if symptoms worsen or fail to improve.

## 2019-01-23 ENCOUNTER — LAB VISIT (OUTPATIENT)
Dept: LAB | Facility: HOSPITAL | Age: 49
End: 2019-01-23
Attending: INTERNAL MEDICINE
Payer: COMMERCIAL

## 2019-01-23 DIAGNOSIS — L65.9 HAIR LOSS: ICD-10-CM

## 2019-01-23 DIAGNOSIS — E55.9 VITAMIN D DEFICIENCY: ICD-10-CM

## 2019-01-23 LAB
25(OH)D3+25(OH)D2 SERPL-MCNC: 19 NG/ML
ALBUMIN SERPL BCP-MCNC: 4 G/DL
ALP SERPL-CCNC: 57 U/L
ALT SERPL W/O P-5'-P-CCNC: 20 U/L
ANION GAP SERPL CALC-SCNC: 10 MMOL/L
AST SERPL-CCNC: 23 U/L
BASOPHILS # BLD AUTO: 0.05 K/UL
BASOPHILS NFR BLD: 1 %
BILIRUB SERPL-MCNC: 0.6 MG/DL
BUN SERPL-MCNC: 13 MG/DL
CALCIUM SERPL-MCNC: 9 MG/DL
CHLORIDE SERPL-SCNC: 105 MMOL/L
CO2 SERPL-SCNC: 24 MMOL/L
CREAT SERPL-MCNC: 0.7 MG/DL
DIFFERENTIAL METHOD: ABNORMAL
EOSINOPHIL # BLD AUTO: 0.1 K/UL
EOSINOPHIL NFR BLD: 1.5 %
ERYTHROCYTE [DISTWIDTH] IN BLOOD BY AUTOMATED COUNT: 13.2 %
EST. GFR  (AFRICAN AMERICAN): >60 ML/MIN/1.73 M^2
EST. GFR  (NON AFRICAN AMERICAN): >60 ML/MIN/1.73 M^2
GLUCOSE SERPL-MCNC: 90 MG/DL
HCT VFR BLD AUTO: 41.2 %
HGB BLD-MCNC: 13 G/DL
IMM GRANULOCYTES # BLD AUTO: 0.01 K/UL
IMM GRANULOCYTES NFR BLD AUTO: 0.2 %
LYMPHOCYTES # BLD AUTO: 1.5 K/UL
LYMPHOCYTES NFR BLD: 32.2 %
MCH RBC QN AUTO: 29.5 PG
MCHC RBC AUTO-ENTMCNC: 31.6 G/DL
MCV RBC AUTO: 94 FL
MONOCYTES # BLD AUTO: 0.3 K/UL
MONOCYTES NFR BLD: 6.3 %
NEUTROPHILS # BLD AUTO: 2.8 K/UL
NEUTROPHILS NFR BLD: 58.8 %
NRBC BLD-RTO: 0 /100 WBC
PLATELET # BLD AUTO: 351 K/UL
PMV BLD AUTO: 9.9 FL
POTASSIUM SERPL-SCNC: 3.8 MMOL/L
PROT SERPL-MCNC: 7.2 G/DL
RBC # BLD AUTO: 4.4 M/UL
SODIUM SERPL-SCNC: 139 MMOL/L
T3 SERPL-MCNC: 70 NG/DL
T4 FREE SERPL-MCNC: 0.94 NG/DL
TSH SERPL DL<=0.005 MIU/L-ACNC: 0.9 UIU/ML
WBC # BLD AUTO: 4.78 K/UL

## 2019-01-23 PROCEDURE — 84443 ASSAY THYROID STIM HORMONE: CPT

## 2019-01-23 PROCEDURE — 82306 VITAMIN D 25 HYDROXY: CPT

## 2019-01-23 PROCEDURE — 36415 COLL VENOUS BLD VENIPUNCTURE: CPT | Mod: PO

## 2019-01-23 PROCEDURE — 84480 ASSAY TRIIODOTHYRONINE (T3): CPT

## 2019-01-23 PROCEDURE — 80053 COMPREHEN METABOLIC PANEL: CPT

## 2019-01-23 PROCEDURE — 84439 ASSAY OF FREE THYROXINE: CPT

## 2019-01-23 PROCEDURE — 85025 COMPLETE CBC W/AUTO DIFF WBC: CPT

## 2019-01-24 ENCOUNTER — PATIENT MESSAGE (OUTPATIENT)
Dept: FAMILY MEDICINE | Facility: CLINIC | Age: 49
End: 2019-01-24

## 2019-01-30 ENCOUNTER — PATIENT MESSAGE (OUTPATIENT)
Dept: FAMILY MEDICINE | Facility: CLINIC | Age: 49
End: 2019-01-30

## 2019-02-12 ENCOUNTER — HOSPITAL ENCOUNTER (OUTPATIENT)
Dept: RADIOLOGY | Facility: HOSPITAL | Age: 49
Discharge: HOME | End: 2019-02-12
Attending: INTERNAL MEDICINE
Payer: COMMERCIAL

## 2019-02-12 DIAGNOSIS — Z12.31 ENCOUNTER FOR SCREENING MAMMOGRAM FOR MALIGNANT NEOPLASM OF BREAST: ICD-10-CM

## 2019-02-12 PROCEDURE — 77067 MAMMO DIGITAL SCREENING BILAT WITH TOMOSYNTHESIS_CAD: ICD-10-PCS | Mod: 26,,, | Performed by: RADIOLOGY

## 2019-02-12 PROCEDURE — 77063 MAMMO DIGITAL SCREENING BILAT WITH TOMOSYNTHESIS_CAD: ICD-10-PCS | Mod: 26,,, | Performed by: RADIOLOGY

## 2019-02-12 PROCEDURE — 77063 BREAST TOMOSYNTHESIS BI: CPT | Mod: 26,,, | Performed by: RADIOLOGY

## 2019-02-12 PROCEDURE — 77067 SCR MAMMO BI INCL CAD: CPT | Mod: 26,,, | Performed by: RADIOLOGY

## 2019-02-12 PROCEDURE — 77067 SCR MAMMO BI INCL CAD: CPT | Mod: TC,PO

## 2019-08-05 ENCOUNTER — TELEPHONE (OUTPATIENT)
Dept: OPTOMETRY | Facility: CLINIC | Age: 49
End: 2019-08-05

## 2019-08-14 ENCOUNTER — OFFICE VISIT (OUTPATIENT)
Dept: OPTOMETRY | Facility: CLINIC | Age: 49
End: 2019-08-14
Payer: COMMERCIAL

## 2019-08-14 DIAGNOSIS — H52.203 MYOPIA WITH ASTIGMATISM AND PRESBYOPIA, BILATERAL: ICD-10-CM

## 2019-08-14 DIAGNOSIS — Z01.00 EXAMINATION OF EYES AND VISION: Primary | ICD-10-CM

## 2019-08-14 DIAGNOSIS — H52.4 MYOPIA WITH ASTIGMATISM AND PRESBYOPIA, BILATERAL: ICD-10-CM

## 2019-08-14 DIAGNOSIS — Z46.0 CONTACT LENS/GLASSES FITTING: ICD-10-CM

## 2019-08-14 DIAGNOSIS — H52.13 MYOPIA WITH ASTIGMATISM AND PRESBYOPIA, BILATERAL: ICD-10-CM

## 2019-08-14 DIAGNOSIS — Z46.0 CONTACT LENS/GLASSES FITTING: Primary | ICD-10-CM

## 2019-08-14 PROCEDURE — 99999 PR PBB SHADOW E&M-EST. PATIENT-LVL I: ICD-10-PCS | Mod: PBBFAC,,, | Performed by: OPTOMETRIST

## 2019-08-14 PROCEDURE — 92015 PR REFRACTION: ICD-10-PCS | Mod: S$GLB,,, | Performed by: OPTOMETRIST

## 2019-08-14 PROCEDURE — 92310 PR CONTACT LENS FITTING (NO CHANGE): ICD-10-PCS | Mod: CSM,,, | Performed by: OPTOMETRIST

## 2019-08-14 PROCEDURE — 99499 NO LOS: ICD-10-PCS | Mod: S$GLB,,, | Performed by: OPTOMETRIST

## 2019-08-14 PROCEDURE — 92014 PR EYE EXAM, EST PATIENT,COMPREHESV: ICD-10-PCS | Mod: S$GLB,,, | Performed by: OPTOMETRIST

## 2019-08-14 PROCEDURE — 92014 COMPRE OPH EXAM EST PT 1/>: CPT | Mod: S$GLB,,, | Performed by: OPTOMETRIST

## 2019-08-14 PROCEDURE — 99999 PR PBB SHADOW E&M-EST. PATIENT-LVL III: CPT | Mod: PBBFAC,,, | Performed by: OPTOMETRIST

## 2019-08-14 PROCEDURE — 92015 DETERMINE REFRACTIVE STATE: CPT | Mod: S$GLB,,, | Performed by: OPTOMETRIST

## 2019-08-14 PROCEDURE — 92310 CONTACT LENS FITTING OU: CPT | Mod: CSM,,, | Performed by: OPTOMETRIST

## 2019-08-14 PROCEDURE — 99499 UNLISTED E&M SERVICE: CPT | Mod: S$GLB,,, | Performed by: OPTOMETRIST

## 2019-08-14 PROCEDURE — 99999 PR PBB SHADOW E&M-EST. PATIENT-LVL III: ICD-10-PCS | Mod: PBBFAC,,, | Performed by: OPTOMETRIST

## 2019-08-14 PROCEDURE — 99999 PR PBB SHADOW E&M-EST. PATIENT-LVL I: CPT | Mod: PBBFAC,,, | Performed by: OPTOMETRIST

## 2019-08-14 NOTE — PROGRESS NOTES
HPI     Routine eye exam with contacts--dle-2017    Pt complains of slight blurred vision at distance with contacts. Needing   updated contact lens rx. Denies any eye pain. No flashes or floaters.     Last edited by Roxann Groves on 8/14/2019  3:05 PM. (History)        ROS     Positive for: Eyes    Negative for: Constitutional, Gastrointestinal, Neurological, Skin,   Genitourinary, Musculoskeletal, HENT, Endocrine, Cardiovascular,   Respiratory, Psychiatric, Allergic/Imm, Heme/Lymph    Last edited by BARBARA Valadez, OD on 8/14/2019  3:17 PM. (History)        Assessment /Plan     For exam results, see Encounter Report.    Examination of eyes and vision    Myopia with astigmatism and presbyopia, bilateral    Contact lens/glasses fitting      Ocular health exam OU--pt defers DFE  Updated specs rx, gave copy  Updated clrx, gave copy and new trials--slight decrease power OD  Knows to call/ message if any issues    DAILY WEAR CONTACT LENSES  Continue with Daily Wear of contact lenses, up to all waking hours.  Continue with approved contact lens disinfection / rewetting drops as discussed.  Dispose of lenses daily.  Stop wearing your lenses and call our office if redness, blurred vision, or pain persists more than 12 hours.  We recommend an annual eye exam for contact lens patients.    Discussed and educated patient on current findings /plan.  RTC 1 year, prn if any changes / issues

## 2019-08-14 NOTE — PATIENT INSTRUCTIONS
"DRY EYES:  Use Over The Counter artificial tears as needed for dry eye symptoms.  Some common brands include:  Systane, Optive, and Refresh.  These drops can be used as frequently as desired, but may be most helpful use during long periods of concentrated work.  For example, reading / working at the computer.  Avoid drops that "get redness out", as these contain medication that may further irritate the eyes.    ALLERGY EYES / SYMPTOMS:    Over the counter medications include--Zaditor and Alaway  Use as directed 1-2 drops daily for symptoms of itching / watering eyes.  These drops will not help for dry eye or exposure symptoms.    DAILY WEAR CONTACT LENSES  Continue with Daily Wear of contact lenses, up to all waking hours.  Continue with approved contact lens disinfection / rewetting drops as discussed.  Dispose of lenses daily.  Stop wearing your lenses and call our office if redness, blurred vision, or pain persists more than 12 hours.  We recommend an annual eye exam for contact lens patients.    "

## 2019-08-30 ENCOUNTER — PATIENT OUTREACH (OUTPATIENT)
Dept: ADMINISTRATIVE | Facility: HOSPITAL | Age: 49
End: 2019-08-30

## 2019-10-10 ENCOUNTER — LAB VISIT (OUTPATIENT)
Dept: LAB | Facility: HOSPITAL | Age: 49
End: 2019-10-10
Attending: ALLERGY & IMMUNOLOGY
Payer: COMMERCIAL

## 2019-10-10 DIAGNOSIS — L20.84 INTRINSIC ALLERGIC ECZEMA: ICD-10-CM

## 2019-10-10 DIAGNOSIS — K29.00 GASTRITIS, ACUTE: ICD-10-CM

## 2019-10-10 DIAGNOSIS — J30.2 SEASONAL ALLERGIC RHINITIS: Primary | ICD-10-CM

## 2019-10-10 LAB
BASOPHILS # BLD AUTO: 0.06 K/UL (ref 0–0.2)
BASOPHILS NFR BLD: 1.1 % (ref 0–1.9)
CRP SERPL-MCNC: 3.9 MG/L (ref 0–8.2)
DIFFERENTIAL METHOD: NORMAL
EOSINOPHIL # BLD AUTO: 0.1 K/UL (ref 0–0.5)
EOSINOPHIL NFR BLD: 2.7 % (ref 0–8)
ERYTHROCYTE [DISTWIDTH] IN BLOOD BY AUTOMATED COUNT: 12.9 % (ref 11.5–14.5)
ERYTHROCYTE [SEDIMENTATION RATE] IN BLOOD BY WESTERGREN METHOD: 3 MM/HR (ref 0–20)
HCT VFR BLD AUTO: 40.8 % (ref 37–48.5)
HGB BLD-MCNC: 13.5 G/DL (ref 12–16)
IGA SERPL-MCNC: 207 MG/DL (ref 40–350)
IGE SERPL-ACNC: 1065 IU/ML (ref 0–100)
IGG SERPL-MCNC: 763 MG/DL (ref 650–1600)
IGM SERPL-MCNC: 163 MG/DL (ref 50–300)
IMM GRANULOCYTES # BLD AUTO: 0.01 K/UL (ref 0–0.04)
IMM GRANULOCYTES NFR BLD AUTO: 0.2 % (ref 0–0.5)
LYMPHOCYTES # BLD AUTO: 1.8 K/UL (ref 1–4.8)
LYMPHOCYTES NFR BLD: 33.7 % (ref 18–48)
MCH RBC QN AUTO: 30.4 PG (ref 27–31)
MCHC RBC AUTO-ENTMCNC: 33.1 G/DL (ref 32–36)
MCV RBC AUTO: 92 FL (ref 82–98)
MONOCYTES # BLD AUTO: 0.4 K/UL (ref 0.3–1)
MONOCYTES NFR BLD: 7.8 % (ref 4–15)
NEUTROPHILS # BLD AUTO: 2.9 K/UL (ref 1.8–7.7)
NEUTROPHILS NFR BLD: 54.5 % (ref 38–73)
NRBC BLD-RTO: 0 /100 WBC
PLATELET # BLD AUTO: 303 K/UL (ref 150–350)
PMV BLD AUTO: 9.6 FL (ref 9.2–12.9)
RBC # BLD AUTO: 4.44 M/UL (ref 4–5.4)
WBC # BLD AUTO: 5.25 K/UL (ref 3.9–12.7)

## 2019-10-10 PROCEDURE — 82785 ASSAY OF IGE: CPT

## 2019-10-10 PROCEDURE — 85025 COMPLETE CBC W/AUTO DIFF WBC: CPT

## 2019-10-10 PROCEDURE — 36415 COLL VENOUS BLD VENIPUNCTURE: CPT | Mod: PO

## 2019-10-10 PROCEDURE — 86003 ALLG SPEC IGE CRUDE XTRC EA: CPT | Mod: 59

## 2019-10-10 PROCEDURE — 85651 RBC SED RATE NONAUTOMATED: CPT | Mod: PO

## 2019-10-10 PROCEDURE — 86140 C-REACTIVE PROTEIN: CPT

## 2019-10-10 PROCEDURE — 82784 ASSAY IGA/IGD/IGG/IGM EACH: CPT | Mod: 59

## 2019-10-10 PROCEDURE — 86003 ALLG SPEC IGE CRUDE XTRC EA: CPT

## 2019-10-14 LAB
ALMOND IGE QN: 1.65 KU/L
DEPRECATED ALMOND IGE RAST QL: ABNORMAL
DEPRECATED GARLIC IGE RAST QL: ABNORMAL
DEPRECATED LEMON IGE RAST QL: ABNORMAL
DEPRECATED LIME IGE RAST QL: NORMAL
DEPRECATED ONION IGE RAST QL: ABNORMAL
DEPRECATED SHRIMP IGE RAST QL: NORMAL
DEPRECATED SPINACH IGE RAST QL: ABNORMAL
DEPRECATED TOMATO IGE RAST QL: ABNORMAL
GARLIC IGE QN: 1.82 KU/L
LEMON IGE QN: 5.09 KU/L
LIME IGE QN: <0.35 KU/L
ONION IGE QN: 1.57 KU/L
SHRIMP IGE QN: <0.35 KU/L
SPINACH IGE QN: 1.83 KU/L
TOMATO IGE QN: 5.46 KU/L

## 2019-10-17 LAB
ALLERGEN NAME: NORMAL
ALLERGEN NAME: NORMAL
ALLERGEN RESULT: NORMAL
ALLERGEN RESULT: NORMAL

## 2019-10-22 ENCOUNTER — OFFICE VISIT (OUTPATIENT)
Dept: DERMATOLOGY | Facility: CLINIC | Age: 49
End: 2019-10-22
Payer: COMMERCIAL

## 2019-10-22 VITALS — HEIGHT: 62 IN | WEIGHT: 128.06 LBS | BODY MASS INDEX: 23.57 KG/M2 | RESPIRATION RATE: 18 BRPM

## 2019-10-22 DIAGNOSIS — L82.1 SEBORRHEIC KERATOSES: Primary | ICD-10-CM

## 2019-10-22 PROCEDURE — 99213 PR OFFICE/OUTPT VISIT, EST, LEVL III, 20-29 MIN: ICD-10-PCS | Mod: S$GLB,,, | Performed by: DERMATOLOGY

## 2019-10-22 PROCEDURE — 3008F PR BODY MASS INDEX (BMI) DOCUMENTED: ICD-10-PCS | Mod: CPTII,S$GLB,, | Performed by: DERMATOLOGY

## 2019-10-22 PROCEDURE — 99213 OFFICE O/P EST LOW 20 MIN: CPT | Mod: S$GLB,,, | Performed by: DERMATOLOGY

## 2019-10-22 PROCEDURE — 99999 PR PBB SHADOW E&M-EST. PATIENT-LVL III: CPT | Mod: PBBFAC,,, | Performed by: DERMATOLOGY

## 2019-10-22 PROCEDURE — 3008F BODY MASS INDEX DOCD: CPT | Mod: CPTII,S$GLB,, | Performed by: DERMATOLOGY

## 2019-10-22 PROCEDURE — 99999 PR PBB SHADOW E&M-EST. PATIENT-LVL III: ICD-10-PCS | Mod: PBBFAC,,, | Performed by: DERMATOLOGY

## 2019-10-22 NOTE — PROGRESS NOTES
Subjective:       Patient ID:  Maria L Smyth is a 48 y.o. female who presents for   Chief Complaint   Patient presents with    Mole     HPI  Last OV with Dr Woodruff 1/2019 for skin check.  Since then, she noticed 2 spots on her face getting darker   Location: Face  Duration: years/3 months  Symptoms: Getting darker  Relieving factors/Previous treatments: no prior treatments    Dad has a hx of scc    Review of Systems   Skin: Positive for itching, dry skin, daily sunscreen use (has sun screen in car), activity-related sunscreen use and wears hat. Negative for rash.   Hematologic/Lymphatic: Does not bruise/bleed easily.      Past Medical History:   Diagnosis Date    Abnormal Pap smear 2011    pt states that she had a biopsy that resulted WNL.    Abnormal Pap smear of cervix     Allergic conjunctivitis     ALLERGIC RHINITIS     Atopic dermatitis     Food allergy 9/24/2012    Immunotherapy     Migraine headache        Objective:    Physical Exam   Constitutional: She appears well-developed and well-nourished.   HENT:   Mouth/Throat: Lips normal.    Eyes: Lids are normal.    Neurological: She is alert and oriented to person, place, and time.   Psychiatric: She has a normal mood and affect.   Skin:   Areas Examined (abnormalities noted in diagram):   Head / Face Inspection Performed  Neck Inspection Performed              Diagram Legend     Erythematous scaling macule/papule c/w actinic keratosis       Vascular papule c/w angioma      Pigmented verrucoid papule/plaque c/w seborrheic keratosis      Yellow umbilicated papule c/w sebaceous hyperplasia      Irregularly shaped tan macule c/w lentigo     1-2 mm smooth white papules consistent with Milia      Movable subcutaneous cyst with punctum c/w epidermal inclusion cyst      Subcutaneous movable cyst c/w pilar cyst      Firm pink to brown papule c/w dermatofibroma      Pedunculated fleshy papule(s) c/w skin tag(s)      Evenly pigmented macule c/w junctional  nevus     Mildly variegated pigmented, slightly irregular-bordered macule c/w mildly atypical nevus      Flesh colored to evenly pigmented papule c/w intradermal nevus       Pink pearly papule/plaque c/w basal cell carcinoma      Erythematous hyperkeratotic cursted plaque c/w SCC      Surgical scar with no sign of skin cancer recurrence      Open and closed comedones      Inflammatory papules and pustules      Verrucoid papule consistent consistent with wart     Erythematous eczematous patches and plaques     Dystrophic onycholytic nail with subungual debris c/w onychomycosis     Umbilicated papule    Erythematous-base heme-crusted tan verrucoid plaque consistent with inflamed seborrheic keratosis     Erythematous Silvery Scaling Plaque c/w Psoriasis     See annotation      Assessment / Plan:      Seborrheic Keratosis  These are benign inherited growths without a malignant potential. Reassurance given to patient. No treatment is necessary.   Discussed LN as treatment option       Follow up for as scheduled for annual follow up.

## 2019-10-29 LAB
ALLERGEN NAME: NORMAL
ALLERGEN RESULT: NORMAL

## 2019-11-26 RX ORDER — RIZATRIPTAN BENZOATE 10 MG/1
10 TABLET ORAL ONCE AS NEEDED
Qty: 12 TABLET | Refills: 11 | Status: CANCELLED | OUTPATIENT
Start: 2019-11-26 | End: 2019-11-26

## 2019-11-28 RX ORDER — RIZATRIPTAN BENZOATE 10 MG/1
10 TABLET ORAL ONCE AS NEEDED
Qty: 12 TABLET | Refills: 11 | Status: SHIPPED | OUTPATIENT
Start: 2019-11-28 | End: 2021-01-20 | Stop reason: SDUPTHER

## 2020-07-01 RX ORDER — EPINEPHRINE 0.3 MG/.3ML
1 INJECTION, SOLUTION INTRAMUSCULAR ONCE
Qty: 2 EACH | Refills: 1 | Status: CANCELLED | OUTPATIENT
Start: 2020-07-01 | End: 2020-07-01

## 2020-07-01 RX ORDER — EPINEPHRINE 0.3 MG/.3ML
1 INJECTION, SOLUTION INTRAMUSCULAR ONCE
Qty: 2 EACH | Refills: 1 | OUTPATIENT
Start: 2020-07-01 | End: 2020-07-01

## 2020-07-01 RX ORDER — MONTELUKAST SODIUM 10 MG/1
10 TABLET ORAL NIGHTLY
Qty: 90 TABLET | Refills: 3 | Status: CANCELLED | OUTPATIENT
Start: 2020-07-01

## 2020-07-02 RX ORDER — MONTELUKAST SODIUM 10 MG/1
10 TABLET ORAL NIGHTLY
Qty: 90 TABLET | Refills: 3 | Status: SHIPPED | OUTPATIENT
Start: 2020-07-02 | End: 2022-02-04

## 2020-07-02 RX ORDER — EPINEPHRINE 0.3 MG/.3ML
1 INJECTION, SOLUTION INTRAMUSCULAR ONCE
Qty: 2 EACH | Refills: 1 | Status: SHIPPED | OUTPATIENT
Start: 2020-07-02 | End: 2020-07-02

## 2020-08-05 ENCOUNTER — PATIENT OUTREACH (OUTPATIENT)
Dept: ADMINISTRATIVE | Facility: HOSPITAL | Age: 50
End: 2020-08-05

## 2020-08-05 NOTE — PROGRESS NOTES
Non-compliant [facility] report chart review - Chart review completed for the following HM test if overdue  (Mammogram, Colonoscopy, Cervical Cancer Screening,  Diabetic lab testing, and/or Dilated EYE EXAM)  08/05/2020    Care Everywhere and Media reports - updates requested and reviewed.        [facility] and [facility] reviewed.  Pap Smear and/or  LABS       DIS reviewed for test needed.  Mammogram            Health Maintenance Due   Topic Date Due    Hepatitis C Screening  1970    HIV Screening  11/30/1986    Cervical Cancer Screening  05/20/2019    Mammogram  02/12/2020

## 2020-08-28 DIAGNOSIS — Z12.39 BREAST CANCER SCREENING: ICD-10-CM

## 2020-09-11 ENCOUNTER — PATIENT OUTREACH (OUTPATIENT)
Dept: ADMINISTRATIVE | Facility: HOSPITAL | Age: 50
End: 2020-09-11

## 2020-09-11 NOTE — PROGRESS NOTES
Non-compliant [facility] report chart review - Chart review completed for the following HM test if overdue  (Mammogram, Colonoscopy, Cervical Cancer Screening,  Diabetic lab testing, and/or Dilated EYE EXAM)  09/11/2020    Care Everywhere and Media reports - updates requested and reviewed.        [facility] and [facility] reviewed.  Pap Smear and/or  LABS       DIS reviewed for test needed.  Mammogram        Recently outreached/reviewed      Health Maintenance Due   Topic Date Due    Hepatitis C Screening  1970    HIV Screening  11/30/1986    Cervical Cancer Screening  05/20/2019    Mammogram  02/12/2020    Influenza Vaccine (1) 08/01/2020

## 2020-09-23 ENCOUNTER — HOSPITAL ENCOUNTER (OUTPATIENT)
Dept: RADIOLOGY | Facility: HOSPITAL | Age: 50
Discharge: HOME | End: 2020-09-23
Attending: INTERNAL MEDICINE
Payer: COMMERCIAL

## 2020-09-23 ENCOUNTER — OFFICE VISIT (OUTPATIENT)
Dept: FAMILY MEDICINE | Facility: CLINIC | Age: 50
End: 2020-09-23
Payer: COMMERCIAL

## 2020-09-23 VITALS
WEIGHT: 130.5 LBS | SYSTOLIC BLOOD PRESSURE: 120 MMHG | BODY MASS INDEX: 23.87 KG/M2 | TEMPERATURE: 98 F | DIASTOLIC BLOOD PRESSURE: 78 MMHG | HEART RATE: 64 BPM | OXYGEN SATURATION: 99 %

## 2020-09-23 DIAGNOSIS — Z00.00 WELL ADULT EXAM: Primary | ICD-10-CM

## 2020-09-23 DIAGNOSIS — M19.049 CMC ARTHRITIS: ICD-10-CM

## 2020-09-23 DIAGNOSIS — G43.009 MIGRAINE WITHOUT AURA AND WITHOUT STATUS MIGRAINOSUS, NOT INTRACTABLE: ICD-10-CM

## 2020-09-23 DIAGNOSIS — Z12.39 BREAST CANCER SCREENING: ICD-10-CM

## 2020-09-23 DIAGNOSIS — E55.9 VITAMIN D DEFICIENCY: ICD-10-CM

## 2020-09-23 DIAGNOSIS — J30.9 CHRONIC ALLERGIC RHINITIS: ICD-10-CM

## 2020-09-23 DIAGNOSIS — Z91.018 MULTIPLE FOOD ALLERGIES: ICD-10-CM

## 2020-09-23 PROCEDURE — 77067 SCR MAMMO BI INCL CAD: CPT | Mod: TC,PO

## 2020-09-23 PROCEDURE — 77063 MAMMO DIGITAL SCREENING BILAT WITH TOMOSYNTHESIS_CAD: ICD-10-PCS | Mod: 26,,, | Performed by: RADIOLOGY

## 2020-09-23 PROCEDURE — 99396 PREV VISIT EST AGE 40-64: CPT | Mod: S$GLB,,, | Performed by: INTERNAL MEDICINE

## 2020-09-23 PROCEDURE — 77067 SCR MAMMO BI INCL CAD: CPT | Mod: 26,,, | Performed by: RADIOLOGY

## 2020-09-23 PROCEDURE — 99396 PR PREVENTIVE VISIT,EST,40-64: ICD-10-PCS | Mod: S$GLB,,, | Performed by: INTERNAL MEDICINE

## 2020-09-23 PROCEDURE — 3008F BODY MASS INDEX DOCD: CPT | Mod: CPTII,S$GLB,, | Performed by: INTERNAL MEDICINE

## 2020-09-23 PROCEDURE — 3008F PR BODY MASS INDEX (BMI) DOCUMENTED: ICD-10-PCS | Mod: CPTII,S$GLB,, | Performed by: INTERNAL MEDICINE

## 2020-09-23 PROCEDURE — 77063 BREAST TOMOSYNTHESIS BI: CPT | Mod: 26,,, | Performed by: RADIOLOGY

## 2020-09-23 PROCEDURE — 77067 MAMMO DIGITAL SCREENING BILAT WITH TOMOSYNTHESIS_CAD: ICD-10-PCS | Mod: 26,,, | Performed by: RADIOLOGY

## 2020-09-23 RX ORDER — DICLOFENAC SODIUM 30 MG/G
GEL TOPICAL
Qty: 100 G | Refills: 0 | Status: SHIPPED | OUTPATIENT
Start: 2020-09-23 | End: 2023-05-08 | Stop reason: ALTCHOICE

## 2020-09-23 NOTE — PROGRESS NOTES
Subjective:       Patient ID: Maria L Smyth is a 49 y.o. female.    Medication List with Changes/Refills   New Medications    DICLOFENAC SODIUM (SOLARAZE) 3 % GEL    To use to affected area twice a day   Current Medications    B-COMPLEX WITH VITAMIN C (Z-BEC OR EQUIV) TABLET    Take 1 tablet by mouth once daily.    CETIRIZINE (ZYRTEC) 10 MG TABLET    Take 10 mg by mouth as needed.     EPINEPHRINE (EPIPEN) 0.3 MG/0.3 ML ATIN    Use as directed    ERGOCALCIFEROL (ERGOCALCIFEROL) 50,000 UNIT CAP    Take 1 capsule (50,000 Units total) by mouth every 7 days.    FISH OIL-OMEGA-3 FATTY ACIDS 300-1,000 MG CAPSULE    Take 2 g by mouth once daily.    MONTELUKAST (SINGULAIR) 10 MG TABLET    Take 1 tablet (10 mg total) by mouth nightly    RIZATRIPTAN (MAXALT) 10 MG TABLET    Take 1 tablet (10 mg total) by mouth once as needed for Migraine.    TRETINOIN (RETIN-A) 0.05 % CREAM    Apply topically every evening.       Chief Complaint: Annual Exam  She is here today for her wellness exam.        She has chronic environmental allergies that are controlled on nasal steroids, singulair and antihistamines.  She denies any active symptoms.       She has multiple food allergies. If she eats these foods then she will get eczema and upset stomach. In the past she was followed by allergy but due to coronavirus crisis she has not been seen.       She has migraines that are triggered by sulfites. As long as she avoids dried fruit and red wine she can avoid her migraines. If she has a migraine she has good response to maxalt.     She has a vitamin D deficiency with a level of 19 on 10/2019.      Today she complains of pain in the thumb of her right hand with swelling, redness and warmth. She does take ibuprofen for pain at times.       She lives with her  and 3 children. She eats healthy and exercises regularly.      Pap---4/2016 neg  Mammogram ----2/2019  neg  Tdap----2/2016  Influenza vaccine-----annually---refused      Review of  Systems   Constitutional: Negative for appetite change, fatigue, fever and unexpected weight change.   HENT: Negative for congestion, ear pain, hearing loss, sore throat and trouble swallowing.    Eyes: Negative for pain and visual disturbance.   Respiratory: Negative for cough, chest tightness, shortness of breath and wheezing.    Cardiovascular: Negative for chest pain, palpitations and leg swelling.   Gastrointestinal: Negative for abdominal pain, blood in stool, constipation, diarrhea, nausea and vomiting.   Endocrine: Negative for polyuria.   Genitourinary: Negative for dysuria and hematuria.   Musculoskeletal: Positive for arthralgias. Negative for back pain and myalgias.   Skin: Negative for rash.   Neurological: Negative for dizziness, weakness, numbness and headaches.   Hematological: Does not bruise/bleed easily.   Psychiatric/Behavioral: Negative for dysphoric mood, sleep disturbance and suicidal ideas. The patient is not nervous/anxious.        Objective:      Vitals:    09/23/20 1143   BP: 120/78   BP Location: Right arm   Patient Position: Sitting   Pulse: 64   Temp: 97.9 °F (36.6 °C)   TempSrc: Other (see comments)   SpO2: 99%   Weight: 59.2 kg (130 lb 8.2 oz)     Body mass index is 23.87 kg/m².  Physical Exam    General appearance: No acute distress, cooperative  Eyes: PERRL, EOMI, conjunctiva clear  Ears: normal external ear and pinna, tm clear without drainage, canals clear  Nose: Normal mucosa without drainage  Throat: no exudates or erythema, tonsils not enlarged  Mouth: no sores or lesions, moist mucous membranes  Neck: FROM, soft, supple, no thyromegaly, no bruits  Lymph: no anterior or posterior cervical adenopathy  Heart::  Regular rate and rhythm, no murmur  Lung: Clear to ascultation bilaterally, no wheezing, no rales, no rhonchi, no distress  Abdomen: Soft, nontender, no distention, no hepatosplenomegaly, bowel sounds normal, no guarding, no rebound, no peritoneal signs  Skin: no rashes,  no lesions  Extremities: no edema, no cyanosis  Neuro: CN 2-12 intact, 5/5 muscle strength upper and lower extremity bilaterally, 2+ DTRs UE and LE bilaterally, normal gait  Peripheral pulses: 2+ pedal pulses bilaterally, good perfusion and color  Musculoskeletal: FROM, good strenth, no tenderness  Joint: right thumb CMC with erythema, tenderness and warmth     Assessment:       1. Well adult exam    2. Chronic allergic rhinitis    3. Multiple food allergies    4. Migraine without aura and without status migrainosus, not intractable    5. Vitamin D deficiency    6. CMC arthritis        Plan:       Well adult exam  Normal exam and is due for labs.  Mammogram scheduled for today and she has pap schedule.  She did not want the influenza vaccine today.   -     CBC auto differential; Future; Expected date: 09/23/2020  -     Comprehensive metabolic panel; Future; Expected date: 09/23/2020  -     Lipid Panel; Future; Expected date: 09/23/2020  -     TSH; Future; Expected date: 09/23/2020  -     HIV 1/2 Ag/Ab (4th Gen); Future; Expected date: 09/23/2020  -     Hepatitis C Antibody; Future; Expected date: 09/23/2020    Chronic allergic rhinitis  Increased symptoms but continues singulair and zyrtec.     Multiple food allergies  She avoids her food triggers.     Migraine without aura and without status migrainosus, not intractable  Controlled and doing well.     Vitamin D deficiency  Continue supplementation.  Due to recheck vitamin D level with labs.     CMC arthritis  Inflammation of CMC joint and will start topical NSAIDs ointment bid.   -     diclofenac sodium (SOLARAZE) 3 % gel; To use to affected area twice a day  Dispense: 100 g; Refill: 0      Follow up in about 1 year (around 9/23/2021) for annual exam.

## 2020-09-30 ENCOUNTER — LAB VISIT (OUTPATIENT)
Dept: LAB | Facility: HOSPITAL | Age: 50
End: 2020-09-30
Attending: INTERNAL MEDICINE
Payer: COMMERCIAL

## 2020-09-30 DIAGNOSIS — Z00.00 WELL ADULT EXAM: ICD-10-CM

## 2020-09-30 LAB
ALBUMIN SERPL BCP-MCNC: 4.1 G/DL (ref 3.5–5.2)
ALP SERPL-CCNC: 51 U/L (ref 55–135)
ALT SERPL W/O P-5'-P-CCNC: 19 U/L (ref 10–44)
ANION GAP SERPL CALC-SCNC: 12 MMOL/L (ref 8–16)
AST SERPL-CCNC: 20 U/L (ref 10–40)
BASOPHILS # BLD AUTO: 0.05 K/UL (ref 0–0.2)
BASOPHILS NFR BLD: 1.4 % (ref 0–1.9)
BILIRUB SERPL-MCNC: 1 MG/DL (ref 0.1–1)
BUN SERPL-MCNC: 10 MG/DL (ref 6–20)
CALCIUM SERPL-MCNC: 8.9 MG/DL (ref 8.7–10.5)
CHLORIDE SERPL-SCNC: 106 MMOL/L (ref 95–110)
CHOLEST SERPL-MCNC: 283 MG/DL (ref 120–199)
CHOLEST/HDLC SERPL: 3.3 {RATIO} (ref 2–5)
CO2 SERPL-SCNC: 20 MMOL/L (ref 23–29)
CREAT SERPL-MCNC: 0.7 MG/DL (ref 0.5–1.4)
DIFFERENTIAL METHOD: ABNORMAL
EOSINOPHIL # BLD AUTO: 0.2 K/UL (ref 0–0.5)
EOSINOPHIL NFR BLD: 4.1 % (ref 0–8)
ERYTHROCYTE [DISTWIDTH] IN BLOOD BY AUTOMATED COUNT: 13 % (ref 11.5–14.5)
EST. GFR  (AFRICAN AMERICAN): >60 ML/MIN/1.73 M^2
EST. GFR  (NON AFRICAN AMERICAN): >60 ML/MIN/1.73 M^2
GLUCOSE SERPL-MCNC: 93 MG/DL (ref 70–110)
HCT VFR BLD AUTO: 43.7 % (ref 37–48.5)
HDLC SERPL-MCNC: 86 MG/DL (ref 40–75)
HDLC SERPL: 30.4 % (ref 20–50)
HGB BLD-MCNC: 14 G/DL (ref 12–16)
IMM GRANULOCYTES # BLD AUTO: 0.01 K/UL (ref 0–0.04)
IMM GRANULOCYTES NFR BLD AUTO: 0.3 % (ref 0–0.5)
LDLC SERPL CALC-MCNC: 169 MG/DL (ref 63–159)
LYMPHOCYTES # BLD AUTO: 1.7 K/UL (ref 1–4.8)
LYMPHOCYTES NFR BLD: 46.9 % (ref 18–48)
MCH RBC QN AUTO: 30.5 PG (ref 27–31)
MCHC RBC AUTO-ENTMCNC: 32 G/DL (ref 32–36)
MCV RBC AUTO: 95 FL (ref 82–98)
MONOCYTES # BLD AUTO: 0.3 K/UL (ref 0.3–1)
MONOCYTES NFR BLD: 8.4 % (ref 4–15)
NEUTROPHILS # BLD AUTO: 1.4 K/UL (ref 1.8–7.7)
NEUTROPHILS NFR BLD: 38.9 % (ref 38–73)
NONHDLC SERPL-MCNC: 197 MG/DL
NRBC BLD-RTO: 0 /100 WBC
PLATELET # BLD AUTO: 305 K/UL (ref 150–350)
PMV BLD AUTO: 9.8 FL (ref 9.2–12.9)
POTASSIUM SERPL-SCNC: 3.9 MMOL/L (ref 3.5–5.1)
PROT SERPL-MCNC: 7 G/DL (ref 6–8.4)
RBC # BLD AUTO: 4.59 M/UL (ref 4–5.4)
SODIUM SERPL-SCNC: 138 MMOL/L (ref 136–145)
TRIGL SERPL-MCNC: 140 MG/DL (ref 30–150)
TSH SERPL DL<=0.005 MIU/L-ACNC: 1.95 UIU/ML (ref 0.4–4)
WBC # BLD AUTO: 3.69 K/UL (ref 3.9–12.7)

## 2020-09-30 PROCEDURE — 36415 COLL VENOUS BLD VENIPUNCTURE: CPT | Mod: PO

## 2020-09-30 PROCEDURE — 80053 COMPREHEN METABOLIC PANEL: CPT

## 2020-09-30 PROCEDURE — 80061 LIPID PANEL: CPT

## 2020-09-30 PROCEDURE — 84443 ASSAY THYROID STIM HORMONE: CPT

## 2020-09-30 PROCEDURE — 86803 HEPATITIS C AB TEST: CPT

## 2020-09-30 PROCEDURE — 85025 COMPLETE CBC W/AUTO DIFF WBC: CPT

## 2020-09-30 PROCEDURE — 86703 HIV-1/HIV-2 1 RESULT ANTBDY: CPT

## 2020-10-01 LAB
HCV AB SERPL QL IA: NEGATIVE
HIV 1+2 AB+HIV1 P24 AG SERPL QL IA: NEGATIVE

## 2020-10-05 ENCOUNTER — PATIENT MESSAGE (OUTPATIENT)
Dept: FAMILY MEDICINE | Facility: CLINIC | Age: 50
End: 2020-10-05

## 2020-10-05 DIAGNOSIS — E78.5 HYPERLIPIDEMIA, UNSPECIFIED HYPERLIPIDEMIA TYPE: Primary | ICD-10-CM

## 2020-10-13 ENCOUNTER — PATIENT OUTREACH (OUTPATIENT)
Dept: ADMINISTRATIVE | Facility: OTHER | Age: 50
End: 2020-10-13

## 2020-10-15 ENCOUNTER — OFFICE VISIT (OUTPATIENT)
Dept: OBSTETRICS AND GYNECOLOGY | Facility: CLINIC | Age: 50
End: 2020-10-15
Attending: OBSTETRICS & GYNECOLOGY
Payer: COMMERCIAL

## 2020-10-15 VITALS
HEIGHT: 62 IN | WEIGHT: 128.06 LBS | BODY MASS INDEX: 23.57 KG/M2 | DIASTOLIC BLOOD PRESSURE: 80 MMHG | SYSTOLIC BLOOD PRESSURE: 140 MMHG

## 2020-10-15 DIAGNOSIS — Z01.419 WELL FEMALE EXAM WITH ROUTINE GYNECOLOGICAL EXAM: Primary | ICD-10-CM

## 2020-10-15 PROCEDURE — 99386 PR PREVENTIVE VISIT,NEW,40-64: ICD-10-PCS | Mod: S$GLB,,, | Performed by: OBSTETRICS & GYNECOLOGY

## 2020-10-15 PROCEDURE — 3008F BODY MASS INDEX DOCD: CPT | Mod: CPTII,S$GLB,, | Performed by: OBSTETRICS & GYNECOLOGY

## 2020-10-15 PROCEDURE — 99999 PR PBB SHADOW E&M-EST. PATIENT-LVL III: CPT | Mod: PBBFAC,,, | Performed by: OBSTETRICS & GYNECOLOGY

## 2020-10-15 PROCEDURE — 3008F PR BODY MASS INDEX (BMI) DOCUMENTED: ICD-10-PCS | Mod: CPTII,S$GLB,, | Performed by: OBSTETRICS & GYNECOLOGY

## 2020-10-15 PROCEDURE — 99386 PREV VISIT NEW AGE 40-64: CPT | Mod: S$GLB,,, | Performed by: OBSTETRICS & GYNECOLOGY

## 2020-10-15 PROCEDURE — 88175 CYTOPATH C/V AUTO FLUID REDO: CPT

## 2020-10-15 PROCEDURE — 99999 PR PBB SHADOW E&M-EST. PATIENT-LVL III: ICD-10-PCS | Mod: PBBFAC,,, | Performed by: OBSTETRICS & GYNECOLOGY

## 2020-10-15 NOTE — PROGRESS NOTES
SUBJECTIVE:   49 y.o. female   for routine gyn exam. Patient's last menstrual period was 10/02/2020 (approximate)..  She has no unusual complaints          Past Medical History:   Diagnosis Date    Abnormal Pap smear of cervix     Allergic conjunctivitis     ALLERGIC RHINITIS     Atopic dermatitis     Food allergy 2012    Immunotherapy     Migraine headache      Past Surgical History:   Procedure Laterality Date    SHOULDER ARTHROSCOPY  2011    right rotator cuff repair    SINUS SURGERY  1991    TONSILLECTOMY  childhood     Social History     Socioeconomic History    Marital status:      Spouse name: Not on file    Number of children: Not on file    Years of education: Not on file    Highest education level: Not on file   Occupational History    Not on file   Social Needs    Financial resource strain: Not on file    Food insecurity     Worry: Not on file     Inability: Not on file    Transportation needs     Medical: Not on file     Non-medical: Not on file   Tobacco Use    Smoking status: Never Smoker    Smokeless tobacco: Never Used   Substance and Sexual Activity    Alcohol use: Yes     Comment: social    Drug use: No    Sexual activity: Yes     Partners: Male     Birth control/protection: None, Partner-Vasectomy   Lifestyle    Physical activity     Days per week: Not on file     Minutes per session: Not on file    Stress: Not on file   Relationships    Social connections     Talks on phone: Not on file     Gets together: Not on file     Attends Taoism service: Not on file     Active member of club or organization: Not on file     Attends meetings of clubs or organizations: Not on file     Relationship status: Not on file   Other Topics Concern    Are you pregnant or think you may be? No    Breast-feeding No   Social History Narrative    Originally from [place]     Family History   Problem Relation Age of Onset    Allergic rhinitis Son     Allergic  rhinitis Son     Asthma Son     Atopy Son     Eczema Son     Cancer Maternal Grandmother     Cancer Maternal Aunt 40        adenoca lung, breast (Non-smoker)    Diabetes Maternal Aunt     Heart disease Mother 60        s/p cabg    Retinitis pigmentosa Mother     Breast cancer Mother     Heart disease Father 62        s/p cabg    Cancer Father         bladder ca (+tob)    Diabetes Father         pre-diabetes    Cancer Maternal Grandfather         NHL    Immunodeficiency Neg Hx     Urticaria Neg Hx     Angioedema Neg Hx     Allergies Neg Hx     Rhinitis Neg Hx     Ovarian cancer Neg Hx     Colon cancer Neg Hx     Melanoma Neg Hx      OB History    Para Term  AB Living   3 3 3     3   SAB TAB Ectopic Multiple Live Births           3      # Outcome Date GA Lbr [place]/2nd Weight Sex Delivery Anes PTL Lv   3 Term      Vag-Spont  N AMY   2 Term      Vag-Spont  N AMY   1 Term      Vag-Spont  N AMY         Current Outpatient Medications   Medication Sig Dispense Refill    B-complex with vitamin C (Z-BEC OR EQUIV) tablet Take 1 tablet by mouth once daily.      cetirizine (ZYRTEC) 10 MG tablet Take 10 mg by mouth as needed.       diclofenac sodium (SOLARAZE) 3 % gel To use to affected area twice a day 100 g 0    EPINEPHrine (EPIPEN) 0.3 mg/0.3 mL AtIn Use as directed 2 each 3    ergocalciferol (ERGOCALCIFEROL) 50,000 unit Cap Take 1 capsule (50,000 Units total) by mouth every 7 days. 12 capsule 3    fish oil-omega-3 fatty acids 300-1,000 mg capsule Take 2 g by mouth once daily.      montelukast (SINGULAIR) 10 mg tablet Take 1 tablet (10 mg total) by mouth nightly 90 tablet 3    tretinoin (RETIN-A) 0.05 % cream Apply topically every evening. 20 g 3    rizatriptan (MAXALT) 10 MG tablet Take 1 tablet (10 mg total) by mouth once as needed for Migraine. 12 tablet 11     No current facility-administered medications for this visit.      Allergies: Macrobid  [nitrofurantoin monohyd/m-cryst],  "Corn, Effexor  [venlafaxine], Eggs [egg derived], Wheat, and Doxycycline     ROS:  Constitutional: no weight loss, weight gain, fever, fatigue  Eyes:  No vision changes, glasses/contacts  ENT/Mouth: No ulcers, sinus problems, ears ringing, headache  Cardiovascular: No inability to lie flat, chest pain, exercise intolerance, swelling, heart palpitations  Respiratory: No wheezing, coughing blood, shortness of breath, or cough  Gastrointestinal: No diarrhea, bloody stool, nausea/vomiting, constipation, gas, hemorrhoids  Genitourinary: No blood in urine, painful urination, urgency of urination, frequency of urination, incomplete emptying, incontinence, abnormal bleeding, painful periods, heavy periods, vaginal discharge, vaginal odor, painful intercourse, sexual problems, bleeding after intercourse.  Musculoskeletal: No muscle weakness  Skin/Breast: No painful breasts, nipple discharge, masses, rash, ulcers  Neurological: No passing out, seizures, numbness, headache  Endocrine: No diabetes, hypothyroid, hyperthyroid, hot flashes, hair loss, abnormal hair growth, acne  Psychiatric: No depression, crying  Hematologic: No bruises, bleeding, swollen lymph nodes, anemia.      OBJECTIVE:   The patient appears well, alert, oriented x 3, in no distress.  BP (!) 140/80 (BP Location: Right arm, Patient Position: Sitting, BP Method: Medium (Manual))   Ht 5' 2" (1.575 m)   Wt 58.1 kg (128 lb 1.4 oz)   LMP 10/02/2020 (Approximate)   BMI 23.43 kg/m²   NECK: no thyromegaly, trachea midline  SKIN: no acne, striae, hirsutism  CHEST: CTAB  CV: RRR  BREAST EXAM: breasts appear normal, no suspicious masses, no skin or nipple changes or axillary nodes  ABDOMEN: no hernias, masses, or hepatosplenomegaly  GENITALIA: normal external genitalia, no erythema, no discharge  URETHRA: normal urethra, normal urethral meatus  VAGINA: Normal  CERVIX: no lesions or cervical motion tenderness  UTERUS: normal size, contour, position, consistency, " mobility, non-tender  ADNEXA: no mass, fullness, tenderness      ASSESSMENT:   1. Well female exam with routine gynecological exam  Liquid-Based Pap Smear, Screening       PLAN:   Orders Placed This Encounter    Liquid-Based Pap Smear, Screening     Discussed healthy lifestyle including regular exercise, healthy eating, etc.  Return to clinic in 1 year

## 2020-11-04 ENCOUNTER — PATIENT MESSAGE (OUTPATIENT)
Dept: OBSTETRICS AND GYNECOLOGY | Facility: CLINIC | Age: 50
End: 2020-11-04

## 2020-11-14 LAB
FINAL PATHOLOGIC DIAGNOSIS: NORMAL
Lab: NORMAL

## 2021-01-11 ENCOUNTER — TELEPHONE (OUTPATIENT)
Dept: FAMILY MEDICINE | Facility: CLINIC | Age: 51
End: 2021-01-11

## 2021-01-11 DIAGNOSIS — Z20.822 EXPOSURE TO COVID-19 VIRUS: Primary | ICD-10-CM

## 2021-01-13 ENCOUNTER — LAB VISIT (OUTPATIENT)
Dept: LAB | Facility: HOSPITAL | Age: 51
End: 2021-01-13
Attending: INTERNAL MEDICINE
Payer: COMMERCIAL

## 2021-01-13 DIAGNOSIS — Z20.822 EXPOSURE TO COVID-19 VIRUS: ICD-10-CM

## 2021-01-13 PROCEDURE — 86769 SARS-COV-2 COVID-19 ANTIBODY: CPT

## 2021-01-13 PROCEDURE — 36415 COLL VENOUS BLD VENIPUNCTURE: CPT | Mod: PO

## 2021-01-14 ENCOUNTER — PATIENT MESSAGE (OUTPATIENT)
Dept: FAMILY MEDICINE | Facility: CLINIC | Age: 51
End: 2021-01-14

## 2021-01-14 LAB — SARS-COV-2 IGG SERPLBLD QL IA.RAPID: NEGATIVE

## 2021-01-20 RX ORDER — RIZATRIPTAN BENZOATE 10 MG/1
10 TABLET ORAL ONCE AS NEEDED
Qty: 12 TABLET | Refills: 11 | Status: CANCELLED | OUTPATIENT
Start: 2021-01-20 | End: 2021-01-20

## 2021-01-22 RX ORDER — RIZATRIPTAN BENZOATE 10 MG/1
10 TABLET ORAL ONCE AS NEEDED
Qty: 12 TABLET | Refills: 11 | Status: SHIPPED | OUTPATIENT
Start: 2021-01-22 | End: 2022-01-31 | Stop reason: SDUPTHER

## 2021-02-04 ENCOUNTER — OFFICE VISIT (OUTPATIENT)
Dept: ORTHOPEDICS | Facility: CLINIC | Age: 51
End: 2021-02-04
Payer: COMMERCIAL

## 2021-02-04 ENCOUNTER — HOSPITAL ENCOUNTER (OUTPATIENT)
Dept: RADIOLOGY | Facility: HOSPITAL | Age: 51
Discharge: HOME | End: 2021-02-04
Attending: ORTHOPAEDIC SURGERY
Payer: COMMERCIAL

## 2021-02-04 VITALS — DIASTOLIC BLOOD PRESSURE: 81 MMHG | SYSTOLIC BLOOD PRESSURE: 163 MMHG | HEART RATE: 60 BPM

## 2021-02-04 DIAGNOSIS — M25.522 LEFT ELBOW PAIN: ICD-10-CM

## 2021-02-04 DIAGNOSIS — M25.522 LEFT ELBOW PAIN: Primary | ICD-10-CM

## 2021-02-04 DIAGNOSIS — M77.12 LATERAL EPICONDYLITIS OF LEFT ELBOW: Primary | ICD-10-CM

## 2021-02-04 PROCEDURE — 99999 PR PBB SHADOW E&M-EST. PATIENT-LVL III: CPT | Mod: PBBFAC,,, | Performed by: ORTHOPAEDIC SURGERY

## 2021-02-04 PROCEDURE — 73080 XR ELBOW COMPLETE 3 VIEW LEFT: ICD-10-PCS | Mod: 26,LT,, | Performed by: RADIOLOGY

## 2021-02-04 PROCEDURE — 99999 PR PBB SHADOW E&M-EST. PATIENT-LVL III: ICD-10-PCS | Mod: PBBFAC,,, | Performed by: ORTHOPAEDIC SURGERY

## 2021-02-04 PROCEDURE — 99203 PR OFFICE/OUTPT VISIT, NEW, LEVL III, 30-44 MIN: ICD-10-PCS | Mod: 25,S$GLB,, | Performed by: ORTHOPAEDIC SURGERY

## 2021-02-04 PROCEDURE — 73080 X-RAY EXAM OF ELBOW: CPT | Mod: TC,PO,LT

## 2021-02-04 PROCEDURE — 1125F PR PAIN SEVERITY QUANTIFIED, PAIN PRESENT: ICD-10-PCS | Mod: S$GLB,,, | Performed by: ORTHOPAEDIC SURGERY

## 2021-02-04 PROCEDURE — 20605 DRAIN/INJ JOINT/BURSA W/O US: CPT | Mod: LT,S$GLB,, | Performed by: ORTHOPAEDIC SURGERY

## 2021-02-04 PROCEDURE — 99203 OFFICE O/P NEW LOW 30 MIN: CPT | Mod: 25,S$GLB,, | Performed by: ORTHOPAEDIC SURGERY

## 2021-02-04 PROCEDURE — 1125F AMNT PAIN NOTED PAIN PRSNT: CPT | Mod: S$GLB,,, | Performed by: ORTHOPAEDIC SURGERY

## 2021-02-04 PROCEDURE — 73080 X-RAY EXAM OF ELBOW: CPT | Mod: 26,LT,, | Performed by: RADIOLOGY

## 2021-02-04 PROCEDURE — 20605 INTERMEDIATE JOINT ASPIRATION/INJECTION: ICD-10-PCS | Mod: LT,S$GLB,, | Performed by: ORTHOPAEDIC SURGERY

## 2021-02-04 RX ORDER — TRIAMCINOLONE ACETONIDE 40 MG/ML
40 INJECTION, SUSPENSION INTRA-ARTICULAR; INTRAMUSCULAR
Status: DISCONTINUED | OUTPATIENT
Start: 2021-02-04 | End: 2021-02-04 | Stop reason: HOSPADM

## 2021-02-04 RX ORDER — METHOCARBAMOL 500 MG/1
500 TABLET, FILM COATED ORAL 4 TIMES DAILY PRN
Qty: 44 TABLET | Refills: 0 | Status: SHIPPED | OUTPATIENT
Start: 2021-02-04 | End: 2022-02-04

## 2021-02-04 RX ADMIN — TRIAMCINOLONE ACETONIDE 40 MG: 40 INJECTION, SUSPENSION INTRA-ARTICULAR; INTRAMUSCULAR at 03:02

## 2021-03-02 DIAGNOSIS — L57.8 DIFFUSE PHOTODAMAGE OF SKIN: ICD-10-CM

## 2021-03-04 RX ORDER — TRETINOIN 0.5 MG/G
CREAM TOPICAL NIGHTLY
Qty: 20 G | Refills: 3 | OUTPATIENT
Start: 2021-03-04

## 2021-04-01 ENCOUNTER — PATIENT MESSAGE (OUTPATIENT)
Dept: FAMILY MEDICINE | Facility: CLINIC | Age: 51
End: 2021-04-01

## 2021-04-01 DIAGNOSIS — E55.9 VITAMIN D DEFICIENCY: Primary | ICD-10-CM

## 2021-04-05 ENCOUNTER — PATIENT MESSAGE (OUTPATIENT)
Dept: FAMILY MEDICINE | Facility: CLINIC | Age: 51
End: 2021-04-05

## 2021-04-05 DIAGNOSIS — E78.5 HYPERLIPIDEMIA, UNSPECIFIED HYPERLIPIDEMIA TYPE: Primary | ICD-10-CM

## 2021-04-08 ENCOUNTER — LAB VISIT (OUTPATIENT)
Dept: LAB | Facility: HOSPITAL | Age: 51
End: 2021-04-08
Attending: INTERNAL MEDICINE
Payer: COMMERCIAL

## 2021-04-08 DIAGNOSIS — E78.5 HYPERLIPIDEMIA, UNSPECIFIED HYPERLIPIDEMIA TYPE: ICD-10-CM

## 2021-04-08 DIAGNOSIS — E55.9 VITAMIN D DEFICIENCY: ICD-10-CM

## 2021-04-08 PROCEDURE — 80061 LIPID PANEL: CPT | Mod: 91 | Performed by: INTERNAL MEDICINE

## 2021-04-08 PROCEDURE — 82306 VITAMIN D 25 HYDROXY: CPT | Performed by: INTERNAL MEDICINE

## 2021-04-08 PROCEDURE — 83704 LIPOPROTEIN BLD QUAN PART: CPT | Performed by: INTERNAL MEDICINE

## 2021-04-08 PROCEDURE — 36415 COLL VENOUS BLD VENIPUNCTURE: CPT | Mod: PO | Performed by: INTERNAL MEDICINE

## 2021-04-09 LAB
25(OH)D3+25(OH)D2 SERPL-MCNC: 51 NG/ML (ref 30–96)
CHOLEST SERPL-MCNC: 277 MG/DL (ref 120–199)
CHOLEST/HDLC SERPL: 2.8 {RATIO} (ref 2–5)
HDLC SERPL-MCNC: 98 MG/DL (ref 40–75)
HDLC SERPL: 35.4 % (ref 20–50)
LDLC SERPL CALC-MCNC: 165 MG/DL (ref 63–159)
NONHDLC SERPL-MCNC: 179 MG/DL
TRIGL SERPL-MCNC: 70 MG/DL (ref 30–150)

## 2021-04-12 ENCOUNTER — PATIENT MESSAGE (OUTPATIENT)
Dept: FAMILY MEDICINE | Facility: CLINIC | Age: 51
End: 2021-04-12

## 2021-04-12 LAB
CHOLEST SERPL-MCNC: 314 MG/DL
HDL SERPL QN: 9.4 NM
HDL SERPL-SCNC: >41 UMOL/L
HDLC SERPL-MCNC: 99 MG/DL (ref 40–59)
HLD.LARGE SERPL-SCNC: 11.7 UMOL/L
LDL SERPL QN: 21.6 NM
LDL SERPL-SCNC: 1539 NMOL/L
LDL SMALL SERPL-SCNC: 309 NMOL/L
LDLC SERPL CALC-MCNC: 199 MG/DL
PATHOLOGY STUDY: ABNORMAL
TRIGL SERPL-MCNC: 80 MG/DL (ref 30–149)
VLDL LARGE SERPL-SCNC: 2.1 NMOL/L
VLDL SERPL QN: 48 NM

## 2021-04-13 ENCOUNTER — PATIENT MESSAGE (OUTPATIENT)
Dept: FAMILY MEDICINE | Facility: CLINIC | Age: 51
End: 2021-04-13

## 2021-05-06 ENCOUNTER — PATIENT MESSAGE (OUTPATIENT)
Dept: RESEARCH | Facility: HOSPITAL | Age: 51
End: 2021-05-06

## 2021-06-21 DIAGNOSIS — L57.8 DIFFUSE PHOTODAMAGE OF SKIN: ICD-10-CM

## 2021-06-21 RX ORDER — TRETINOIN 0.5 MG/G
CREAM TOPICAL NIGHTLY
Qty: 20 G | Refills: 3 | OUTPATIENT
Start: 2021-06-21

## 2021-09-15 ENCOUNTER — OFFICE VISIT (OUTPATIENT)
Dept: DERMATOLOGY | Facility: CLINIC | Age: 51
End: 2021-09-15
Payer: COMMERCIAL

## 2021-09-15 VITALS — HEIGHT: 62 IN | WEIGHT: 128 LBS | BODY MASS INDEX: 23.55 KG/M2

## 2021-09-15 DIAGNOSIS — L30.9 ECZEMA, UNSPECIFIED TYPE: ICD-10-CM

## 2021-09-15 DIAGNOSIS — L98.8 RHYTIDOSIS FACIALIS: ICD-10-CM

## 2021-09-15 DIAGNOSIS — D22.9 BENIGN NEVUS: Primary | ICD-10-CM

## 2021-09-15 PROCEDURE — 99999 PR PBB SHADOW E&M-EST. PATIENT-LVL III: ICD-10-PCS | Mod: PBBFAC,,, | Performed by: DERMATOLOGY

## 2021-09-15 PROCEDURE — 99999 PR PBB SHADOW E&M-EST. PATIENT-LVL III: CPT | Mod: PBBFAC,,, | Performed by: DERMATOLOGY

## 2021-09-15 PROCEDURE — 1159F PR MEDICATION LIST DOCUMENTED IN MEDICAL RECORD: ICD-10-PCS | Mod: CPTII,S$GLB,, | Performed by: DERMATOLOGY

## 2021-09-15 PROCEDURE — 3008F PR BODY MASS INDEX (BMI) DOCUMENTED: ICD-10-PCS | Mod: CPTII,S$GLB,, | Performed by: DERMATOLOGY

## 2021-09-15 PROCEDURE — 1159F MED LIST DOCD IN RCRD: CPT | Mod: CPTII,S$GLB,, | Performed by: DERMATOLOGY

## 2021-09-15 PROCEDURE — 99214 OFFICE O/P EST MOD 30 MIN: CPT | Mod: S$GLB,,, | Performed by: DERMATOLOGY

## 2021-09-15 PROCEDURE — 3008F BODY MASS INDEX DOCD: CPT | Mod: CPTII,S$GLB,, | Performed by: DERMATOLOGY

## 2021-09-15 PROCEDURE — 99214 PR OFFICE/OUTPT VISIT, EST, LEVL IV, 30-39 MIN: ICD-10-PCS | Mod: S$GLB,,, | Performed by: DERMATOLOGY

## 2021-09-15 RX ORDER — TRETINOIN 0.25 MG/G
CREAM TOPICAL NIGHTLY
Qty: 45 G | Refills: 6 | Status: SHIPPED | OUTPATIENT
Start: 2021-09-15 | End: 2023-06-15 | Stop reason: SDUPTHER

## 2021-09-15 RX ORDER — HYDROCORTISONE 25 MG/G
CREAM TOPICAL 2 TIMES DAILY
Qty: 30 G | Refills: 2 | Status: SHIPPED | OUTPATIENT
Start: 2021-09-15 | End: 2023-05-08

## 2021-09-16 ENCOUNTER — TELEPHONE (OUTPATIENT)
Dept: PHARMACY | Facility: CLINIC | Age: 51
End: 2021-09-16

## 2021-09-21 ENCOUNTER — PATIENT MESSAGE (OUTPATIENT)
Dept: DERMATOLOGY | Facility: CLINIC | Age: 51
End: 2021-09-21

## 2021-09-23 ENCOUNTER — PROCEDURE VISIT (OUTPATIENT)
Dept: DERMATOLOGY | Facility: CLINIC | Age: 51
End: 2021-09-23
Payer: COMMERCIAL

## 2021-09-23 DIAGNOSIS — L73.8 SEBACEOUS HYPERPLASIA: ICD-10-CM

## 2021-09-23 DIAGNOSIS — L73.0 ACNE SCARRING: ICD-10-CM

## 2021-09-23 DIAGNOSIS — I78.1 TELANGIECTASIA: ICD-10-CM

## 2021-09-23 DIAGNOSIS — R23.8 FACIAL AGING: Primary | ICD-10-CM

## 2021-09-23 PROCEDURE — 99499 UNLISTED E&M SERVICE: CPT | Mod: CSM,S$GLB,, | Performed by: DERMATOLOGY

## 2021-09-23 PROCEDURE — 99499 NO LOS: ICD-10-PCS | Mod: CSM,S$GLB,, | Performed by: DERMATOLOGY

## 2021-09-23 PROCEDURE — 99241 PR OFFICE CONSULT, COSMETIC: CPT | Mod: CSM,S$GLB,, | Performed by: DERMATOLOGY

## 2021-09-23 PROCEDURE — 99241 PR OFFICE CONSULT, COSMETIC: ICD-10-PCS | Mod: CSM,S$GLB,, | Performed by: DERMATOLOGY

## 2021-09-27 ENCOUNTER — OFFICE VISIT (OUTPATIENT)
Dept: OPTOMETRY | Facility: CLINIC | Age: 51
End: 2021-09-27
Payer: COMMERCIAL

## 2021-09-27 DIAGNOSIS — H52.4 MYOPIA WITH ASTIGMATISM AND PRESBYOPIA, BILATERAL: ICD-10-CM

## 2021-09-27 DIAGNOSIS — Z46.0 CONTACT LENS/GLASSES FITTING: Primary | ICD-10-CM

## 2021-09-27 DIAGNOSIS — H52.13 MYOPIA WITH ASTIGMATISM AND PRESBYOPIA, BILATERAL: ICD-10-CM

## 2021-09-27 DIAGNOSIS — Z46.0 CONTACT LENS/GLASSES FITTING: ICD-10-CM

## 2021-09-27 DIAGNOSIS — Z01.00 EXAMINATION OF EYES AND VISION: Primary | ICD-10-CM

## 2021-09-27 DIAGNOSIS — H52.203 MYOPIA WITH ASTIGMATISM AND PRESBYOPIA, BILATERAL: ICD-10-CM

## 2021-09-27 PROBLEM — Z97.3 WEARS CONTACT LENSES: Status: ACTIVE | Noted: 2021-09-27

## 2021-09-27 PROCEDURE — 99499 NO LOS: ICD-10-PCS | Mod: S$GLB,,, | Performed by: OPTOMETRIST

## 2021-09-27 PROCEDURE — 99999 PR PBB SHADOW E&M-EST. PATIENT-LVL III: CPT | Mod: PBBFAC,,, | Performed by: OPTOMETRIST

## 2021-09-27 PROCEDURE — 99999 PR PBB SHADOW E&M-EST. PATIENT-LVL III: ICD-10-PCS | Mod: PBBFAC,,, | Performed by: OPTOMETRIST

## 2021-09-27 PROCEDURE — 92014 PR EYE EXAM, EST PATIENT,COMPREHESV: ICD-10-PCS | Mod: S$GLB,,, | Performed by: OPTOMETRIST

## 2021-09-27 PROCEDURE — 92310 PR CONTACT LENS FITTING (NO CHANGE): ICD-10-PCS | Mod: S$GLB,,, | Performed by: OPTOMETRIST

## 2021-09-27 PROCEDURE — 92310 CONTACT LENS FITTING OU: CPT | Mod: S$GLB,,, | Performed by: OPTOMETRIST

## 2021-09-27 PROCEDURE — 92014 COMPRE OPH EXAM EST PT 1/>: CPT | Mod: S$GLB,,, | Performed by: OPTOMETRIST

## 2021-09-27 PROCEDURE — 99499 UNLISTED E&M SERVICE: CPT | Mod: S$GLB,,, | Performed by: OPTOMETRIST

## 2021-09-27 PROCEDURE — 92015 DETERMINE REFRACTIVE STATE: CPT | Mod: S$GLB,,, | Performed by: OPTOMETRIST

## 2021-09-27 PROCEDURE — 92015 PR REFRACTION: ICD-10-PCS | Mod: S$GLB,,, | Performed by: OPTOMETRIST

## 2021-10-06 ENCOUNTER — PATIENT MESSAGE (OUTPATIENT)
Dept: DERMATOLOGY | Facility: CLINIC | Age: 51
End: 2021-10-06

## 2021-10-08 ENCOUNTER — PROCEDURE VISIT (OUTPATIENT)
Dept: DERMATOLOGY | Facility: CLINIC | Age: 51
End: 2021-10-08
Payer: COMMERCIAL

## 2021-10-08 DIAGNOSIS — Z41.1 ELECTIVE PROCEDURE FOR UNACCEPTABLE COSMETIC APPEARANCE: Primary | ICD-10-CM

## 2021-10-08 PROCEDURE — 99499 UNLISTED E&M SERVICE: CPT | Mod: CSM,S$GLB,, | Performed by: DERMATOLOGY

## 2021-10-08 PROCEDURE — 99499 NO LOS: ICD-10-PCS | Mod: CSM,S$GLB,, | Performed by: DERMATOLOGY

## 2021-10-08 PROCEDURE — 17999 PR V BEAM,  25-50 PULSES: ICD-10-PCS | Mod: CSM,S$GLB,, | Performed by: DERMATOLOGY

## 2021-10-08 PROCEDURE — 17999 UNLISTD PX SKN MUC MEMB SUBQ: CPT | Mod: CSM,S$GLB,, | Performed by: DERMATOLOGY

## 2021-11-10 ENCOUNTER — PATIENT MESSAGE (OUTPATIENT)
Dept: DERMATOLOGY | Facility: CLINIC | Age: 51
End: 2021-11-10
Payer: COMMERCIAL

## 2021-11-12 ENCOUNTER — PROCEDURE VISIT (OUTPATIENT)
Dept: DERMATOLOGY | Facility: CLINIC | Age: 51
End: 2021-11-12
Payer: COMMERCIAL

## 2021-11-12 DIAGNOSIS — I78.1 TELANGIECTASIA: ICD-10-CM

## 2021-11-12 DIAGNOSIS — Z41.1 ELECTIVE PROCEDURE FOR UNACCEPTABLE COSMETIC APPEARANCE: Primary | ICD-10-CM

## 2021-11-12 PROCEDURE — 99499 UNLISTED E&M SERVICE: CPT | Mod: CSM,S$GLB,, | Performed by: DERMATOLOGY

## 2021-11-12 PROCEDURE — 17999 UNLISTD PX SKN MUC MEMB SUBQ: CPT | Mod: CSM,S$GLB,, | Performed by: DERMATOLOGY

## 2021-11-12 PROCEDURE — 99499 NO LOS: ICD-10-PCS | Mod: CSM,S$GLB,, | Performed by: DERMATOLOGY

## 2021-11-12 PROCEDURE — 17999 PR V BEAM, 0-25 PULSES: ICD-10-PCS | Mod: CSM,S$GLB,, | Performed by: DERMATOLOGY

## 2021-11-29 ENCOUNTER — HOSPITAL ENCOUNTER (OUTPATIENT)
Dept: RADIOLOGY | Facility: HOSPITAL | Age: 51
Discharge: HOME | End: 2021-11-29
Attending: ORTHOPAEDIC SURGERY
Payer: COMMERCIAL

## 2021-11-29 ENCOUNTER — TELEPHONE (OUTPATIENT)
Dept: ORTHOPEDICS | Facility: CLINIC | Age: 51
End: 2021-11-29
Payer: COMMERCIAL

## 2021-11-29 ENCOUNTER — OFFICE VISIT (OUTPATIENT)
Dept: ORTHOPEDICS | Facility: CLINIC | Age: 51
End: 2021-11-29
Payer: COMMERCIAL

## 2021-11-29 VITALS
HEIGHT: 62 IN | WEIGHT: 128.06 LBS | SYSTOLIC BLOOD PRESSURE: 144 MMHG | HEART RATE: 72 BPM | BODY MASS INDEX: 23.57 KG/M2 | DIASTOLIC BLOOD PRESSURE: 78 MMHG

## 2021-11-29 DIAGNOSIS — M76.72 PERONEAL TENDINITIS OF LEFT LOWER EXTREMITY: Primary | ICD-10-CM

## 2021-11-29 DIAGNOSIS — M79.672 LEFT FOOT PAIN: ICD-10-CM

## 2021-11-29 DIAGNOSIS — M79.672 LEFT FOOT PAIN: Primary | ICD-10-CM

## 2021-11-29 DIAGNOSIS — M25.572 LEFT ANKLE PAIN, UNSPECIFIED CHRONICITY: ICD-10-CM

## 2021-11-29 PROCEDURE — 99213 OFFICE O/P EST LOW 20 MIN: CPT | Mod: S$GLB,,, | Performed by: ORTHOPAEDIC SURGERY

## 2021-11-29 PROCEDURE — 73630 X-RAY EXAM OF FOOT: CPT | Mod: 26,LT,, | Performed by: RADIOLOGY

## 2021-11-29 PROCEDURE — 73610 X-RAY EXAM OF ANKLE: CPT | Mod: TC,PO,LT

## 2021-11-29 PROCEDURE — 73610 XR ANKLE COMPLETE 3 VIEW LEFT: ICD-10-PCS | Mod: 26,LT,, | Performed by: RADIOLOGY

## 2021-11-29 PROCEDURE — 73630 XR FOOT COMPLETE 3 VIEW LEFT: ICD-10-PCS | Mod: 26,LT,, | Performed by: RADIOLOGY

## 2021-11-29 PROCEDURE — 99999 PR PBB SHADOW E&M-EST. PATIENT-LVL III: ICD-10-PCS | Mod: PBBFAC,,, | Performed by: ORTHOPAEDIC SURGERY

## 2021-11-29 PROCEDURE — 73610 X-RAY EXAM OF ANKLE: CPT | Mod: 26,LT,, | Performed by: RADIOLOGY

## 2021-11-29 PROCEDURE — 73630 X-RAY EXAM OF FOOT: CPT | Mod: TC,PO,LT

## 2021-11-29 PROCEDURE — 99999 PR PBB SHADOW E&M-EST. PATIENT-LVL III: CPT | Mod: PBBFAC,,, | Performed by: ORTHOPAEDIC SURGERY

## 2021-11-29 PROCEDURE — 99213 PR OFFICE/OUTPT VISIT, EST, LEVL III, 20-29 MIN: ICD-10-PCS | Mod: S$GLB,,, | Performed by: ORTHOPAEDIC SURGERY

## 2022-01-20 ENCOUNTER — TELEPHONE (OUTPATIENT)
Dept: FAMILY MEDICINE | Facility: CLINIC | Age: 52
End: 2022-01-20
Payer: COMMERCIAL

## 2022-01-20 DIAGNOSIS — Z00.01 ANNUAL VISIT FOR GENERAL ADULT MEDICAL EXAMINATION WITH ABNORMAL FINDINGS: Primary | ICD-10-CM

## 2022-01-25 ENCOUNTER — HOSPITAL ENCOUNTER (OUTPATIENT)
Dept: RADIOLOGY | Facility: HOSPITAL | Age: 52
Discharge: HOME | End: 2022-01-25
Attending: INTERNAL MEDICINE
Payer: COMMERCIAL

## 2022-01-25 DIAGNOSIS — Z12.31 OTHER SCREENING MAMMOGRAM: ICD-10-CM

## 2022-01-25 PROCEDURE — 77063 BREAST TOMOSYNTHESIS BI: CPT | Mod: TC,PO

## 2022-01-25 PROCEDURE — 77067 SCR MAMMO BI INCL CAD: CPT | Mod: 26,,, | Performed by: RADIOLOGY

## 2022-01-25 PROCEDURE — 77063 BREAST TOMOSYNTHESIS BI: CPT | Mod: 26,,, | Performed by: RADIOLOGY

## 2022-01-25 PROCEDURE — 77067 SCR MAMMO BI INCL CAD: CPT | Mod: TC,PO

## 2022-01-25 PROCEDURE — 77067 MAMMO DIGITAL SCREENING BILAT WITH TOMO: ICD-10-PCS | Mod: 26,,, | Performed by: RADIOLOGY

## 2022-01-25 PROCEDURE — 77063 MAMMO DIGITAL SCREENING BILAT WITH TOMO: ICD-10-PCS | Mod: 26,,, | Performed by: RADIOLOGY

## 2022-01-31 DIAGNOSIS — Z00.01 ANNUAL VISIT FOR GENERAL ADULT MEDICAL EXAMINATION WITH ABNORMAL FINDINGS: Primary | ICD-10-CM

## 2022-01-31 RX ORDER — RIZATRIPTAN BENZOATE 10 MG/1
10 TABLET ORAL ONCE AS NEEDED
Qty: 12 TABLET | Refills: 11 | Status: SHIPPED | OUTPATIENT
Start: 2022-01-31 | End: 2023-02-09

## 2022-01-31 NOTE — TELEPHONE ENCOUNTER
Care Due:                  Date            Visit Type   Department     Provider  --------------------------------------------------------------------------------                                MYCHART                              FOLLOWUP/OF  [facility] FAMILY  Last Visit: 09-      [place] VISIT   MEDICINE       Latrice Deutsch  Next Visit: None Scheduled  None         None Found                                                            Last  Test          Frequency    Reason                     Performed    Due Date  --------------------------------------------------------------------------------    Office Visit  12 months..  montelukast..............  09- 09-    Powered by [employer]. Reference number: [id number].   1/31/2022 12:55:17 PM CST

## 2022-02-04 ENCOUNTER — OFFICE VISIT (OUTPATIENT)
Dept: FAMILY MEDICINE | Facility: CLINIC | Age: 52
End: 2022-02-04
Payer: COMMERCIAL

## 2022-02-04 VITALS
HEIGHT: 62 IN | HEART RATE: 73 BPM | OXYGEN SATURATION: 98 % | BODY MASS INDEX: 24.78 KG/M2 | WEIGHT: 134.69 LBS | DIASTOLIC BLOOD PRESSURE: 80 MMHG | SYSTOLIC BLOOD PRESSURE: 120 MMHG

## 2022-02-04 DIAGNOSIS — Z00.01 ANNUAL VISIT FOR GENERAL ADULT MEDICAL EXAMINATION WITH ABNORMAL FINDINGS: Primary | ICD-10-CM

## 2022-02-04 DIAGNOSIS — Z12.11 SCREENING FOR COLON CANCER: ICD-10-CM

## 2022-02-04 DIAGNOSIS — F41.9 ANXIETY: ICD-10-CM

## 2022-02-04 DIAGNOSIS — R74.8 INCREASED LIVER ENZYMES: ICD-10-CM

## 2022-02-04 PROCEDURE — 3074F PR MOST RECENT SYSTOLIC BLOOD PRESSURE < 130 MM HG: ICD-10-PCS | Mod: CPTII,S$GLB,, | Performed by: FAMILY MEDICINE

## 2022-02-04 PROCEDURE — 99999 PR PBB SHADOW E&M-EST. PATIENT-LVL IV: ICD-10-PCS | Mod: PBBFAC,,, | Performed by: FAMILY MEDICINE

## 2022-02-04 PROCEDURE — 3008F PR BODY MASS INDEX (BMI) DOCUMENTED: ICD-10-PCS | Mod: CPTII,S$GLB,, | Performed by: FAMILY MEDICINE

## 2022-02-04 PROCEDURE — 3079F PR MOST RECENT DIASTOLIC BLOOD PRESSURE 80-89 MM HG: ICD-10-PCS | Mod: CPTII,S$GLB,, | Performed by: FAMILY MEDICINE

## 2022-02-04 PROCEDURE — 99396 PR PREVENTIVE VISIT,EST,40-64: ICD-10-PCS | Mod: S$GLB,,, | Performed by: FAMILY MEDICINE

## 2022-02-04 PROCEDURE — 99396 PREV VISIT EST AGE 40-64: CPT | Mod: S$GLB,,, | Performed by: FAMILY MEDICINE

## 2022-02-04 PROCEDURE — 3074F SYST BP LT 130 MM HG: CPT | Mod: CPTII,S$GLB,, | Performed by: FAMILY MEDICINE

## 2022-02-04 PROCEDURE — 1159F MED LIST DOCD IN RCRD: CPT | Mod: CPTII,S$GLB,, | Performed by: FAMILY MEDICINE

## 2022-02-04 PROCEDURE — 3008F BODY MASS INDEX DOCD: CPT | Mod: CPTII,S$GLB,, | Performed by: FAMILY MEDICINE

## 2022-02-04 PROCEDURE — 99999 PR PBB SHADOW E&M-EST. PATIENT-LVL IV: CPT | Mod: PBBFAC,,, | Performed by: FAMILY MEDICINE

## 2022-02-04 PROCEDURE — 3079F DIAST BP 80-89 MM HG: CPT | Mod: CPTII,S$GLB,, | Performed by: FAMILY MEDICINE

## 2022-02-04 PROCEDURE — 1159F PR MEDICATION LIST DOCUMENTED IN MEDICAL RECORD: ICD-10-PCS | Mod: CPTII,S$GLB,, | Performed by: FAMILY MEDICINE

## 2022-02-04 RX ORDER — ALPRAZOLAM 0.25 MG/1
0.25 TABLET ORAL NIGHTLY PRN
Qty: 30 TABLET | Refills: 0 | Status: SHIPPED | OUTPATIENT
Start: 2022-02-04 | End: 2022-04-29 | Stop reason: SDUPTHER

## 2022-02-04 NOTE — PATIENT INSTRUCTIONS
Patient Education       Anxiety Discharge Instructions, Adult   About this topic   Anxiety can cause you to feel very worried. It can also cause physical symptoms like chest pain, stomach aches, or trouble sleeping. While mild anxiety is a normal response to stress, it can cause you problems in your everyday life. You may need follow-up care to help manage your anxiety. Anxiety happens in many forms, like:  · Being scared all the time that something bad is going to happen. This is generalized anxiety.  · Strong bursts of fear where your body has signs that may feel like a heart attack. This is called a panic attack.  · Upsetting thoughts that happen often. There is a need to repeat doing certain things to help get rid of the anxiety caused by these thoughts. The thoughts or actions may be about checking on things, touching things, or worry about germs. This is an obsessive-compulsive disorder.  · Strong fear of an object, place, or condition. This is a phobia.  · Fear that others think bad things about you or being put down by other people. This is social anxiety.  · Nightmares, flashbacks, staying away from people, or having panic attacks when reminded of a shocking or hurtful time or place from the past. This is post-traumatic stress.  Anxiety disorder may be treated in many ways. Some kinds of treatment have you talk about your beliefs, fears, and worries. You may learn how certain thoughts or feelings can raise anxiety. You may also learn what steps to take to lower anxiety. Other kinds of treatment may have you look back on a hurtful event, sad memory, or something you are afraid of. The doctor will help you deal with the feelings that you may have. You may learn ways to cope with unwanted events or thoughts by looking at your fears in a way that feels safe.  What care is needed at home?   · Ask your doctor what you need to do when you go home. Make sure you ask questions if you do not understand what the  doctor says.  · Set a time to talk with a counselor about your worries and feelings. This can help you with your anxiety.  · Take care to follow all instructions when you take your medicines.  · Limit alcohol and caffeine.  · Learn ways to manage stress. Relaxation methods like reflection, deep breathing, and muscle relaxation may be helpful. Things like yoga, exercise, and jaziel chi are also good.  · Talk about your feelings with family members and friends you trust. Talk to someone who can help you see how your thoughts at certain times may raise your anxiety.     What follow-up care is needed?   Your doctor may ask you to make visits to the office to check on your progress. Be sure to keep these visits.  What drugs may be needed?   The doctor may order drugs to help the physical signs of anxiety. Make sure that you take the drugs as taught to you by the doctor. Talk with your doctor about any side effects and ask how long you should take the drug.  Will physical activity be limited?   You may take part in physical activities. Some people are limited because of their anxiety or fear. Talk with your doctor about the right amount of activity for you.  What changes to diet are needed?   Eat a variety of healthy foods and limit drinks with caffeine. You should avoid alcohol, energy drinks, and over-the-counter stimulants.  What problems could happen?   If your anxiety is not treated, it can result in:  · Staying away from work or social events  · Not being able to do everyday tasks  · Keeping away from family and friends  What can be done to prevent this health problem?   · Learn what events, people, or things upset you. Limit your contact with them.  · Talk about your feelings. Talk to someone who can help you see how your thoughts at certain times may raise your anxiety.  · Seek support from your friends and family. Find someone who calms you down. Ask if you can call them when you are getting anxious.  When do I need  to call the doctor?   · You feel you may harm yourself or someone else.  · You can also call a mental health hotline for help.  · You have any physical symptoms, such as chest pain, trouble breathing, or severe belly pain, that could be a sign of a serious problem.  · If you are short of breath.  · If you do not feel like you can be alone.  Teach Back: Helping You Understand   The Teach Back Method helps you understand the information we are giving you. After you talk with the staff, tell them in your own words what you learned. This helps to make sure the staff has described each thing clearly. It also helps to explain things that may have been confusing. Before going home, make sure you can do these:  · I can tell you about my condition and the drugs I need to take.  · I can tell you what may help lower my anxiety.  · I can tell you what I will do if it is hard to breathe or I have chest pain.  · I can tell you what I will do if I do not feel safe or cannot be alone.  Where can I learn more?   [employer]  https://www.tariq.org/Learn-More/Mental-Health-Conditions/Anxiety-Disorders   National Health Service  https://www.nhs.uk/conditions/generalised-anxiety-disorder/symptoms/   National [place] of Health ? [facility]  https://www.hansa.nih.gov/health/relieving-stress-anxiety-tratjecrt-yjjlxqavjc-ccpesyzqbg   National [place] of Mental Health  http://www.nimh.nih.gov/health/publications/anxiety-disorders/complete-index.shtml   Last Reviewed Date   2021-06-08  Consumer Information Use and Disclaimer   This information is not specific medical advice and does not replace information you receive from your health care provider. This is only a brief summary of general information. It does NOT include all information about conditions, illnesses, injuries, tests, procedures, treatments, therapies, discharge instructions or life-style choices that may apply to you. You must talk with your health care provider for complete  information about your health and treatment options. This information should not be used to decide whether or not to accept your health care providers advice, instructions or recommendations. Only your health care provider has the knowledge and training to provide advice that is right for you.  Copyright   Copyright © 2021 [employer] and its affiliates and/or licensors. All rights reserved.

## 2022-02-05 NOTE — PROGRESS NOTES
Subjective:       Patient ID: Maria L Smyth is a 51 y.o. female.    Chief Complaint: Annual Exam    HPI     The patient is coming here today for an annual examination.  The patient had a complete blood work and it showed presence of mild increased liver enzymes, increase cholesterol levels, the patient stated that she usually eats very healthy and exercise daily.  The patient has been under a lot of stress secondary to possible recurrent breast cancer on her mom.  The patient does not want to try medication every day but she would like to take something as needed.  The patient also is due for colonoscopy and she would like to have the fit kit.    The patient is also currently taking vitamin-D 84121 units every week, vitamin-D levels were ordered but this was not done.    Past medical history, past social history was reviewed and discussed with the patient.    Review of Systems   Constitutional: Negative for activity change, appetite change and chills.   HENT: Negative for congestion and ear discharge.    Eyes: Negative for discharge and itching.   Respiratory: Negative for choking and chest tightness.    Cardiovascular: Negative for chest pain, palpitations and leg swelling.   Gastrointestinal: Negative for abdominal distention, abdominal pain and constipation.   Endocrine: Negative for cold intolerance and heat intolerance.   Genitourinary: Negative for dysuria and flank pain.   Musculoskeletal: Negative for arthralgias and back pain.   Skin: Negative for pallor and rash.   Allergic/Immunologic: Negative for environmental allergies and food allergies.   Neurological: Negative for dizziness, facial asymmetry and headaches.   Hematological: Negative for adenopathy. Does not bruise/bleed easily.   Psychiatric/Behavioral: Negative for agitation, confusion, decreased concentration and sleep disturbance. The patient is nervous/anxious.        Objective:      Physical Exam  Vitals and nursing note reviewed.    Constitutional:       General: She is not in acute distress.     Appearance: Normal appearance. She is well-developed and well-nourished. She is not diaphoretic.   HENT:      Head: Normocephalic and atraumatic.      Right Ear: External ear normal.      Left Ear: External ear normal.      Nose: Nose normal.      Mouth/Throat:      Mouth: Oropharynx is clear and moist.      Pharynx: No oropharyngeal exudate.   Eyes:      General: No scleral icterus.        Right eye: No discharge.         Left eye: No discharge.      Conjunctiva/sclera: Conjunctivae normal.      Pupils: Pupils are equal, round, and reactive to light.   Cardiovascular:      Rate and Rhythm: Normal rate and regular rhythm.      Pulses: Intact distal pulses.      Heart sounds: Normal heart sounds. No murmur heard.      Pulmonary:      Effort: Pulmonary effort is normal. No respiratory distress.      Breath sounds: Normal breath sounds. No wheezing.   Abdominal:      General: Abdomen is flat. Bowel sounds are normal. There is no distension.      Palpations: Abdomen is soft. There is no mass.      Tenderness: There is no abdominal tenderness. There is no guarding.   Musculoskeletal:         General: No tenderness or deformity.      Cervical back: Neck supple.   Skin:     Coloration: Skin is not pale.      Findings: No erythema.   Neurological:      Mental Status: She is alert.      Cranial Nerves: No cranial nerve deficit.      Coordination: Coordination normal.   Psychiatric:         Mood and Affect: Mood and affect normal.         Behavior: Behavior normal.         Thought Content: Thought content normal.         Judgment: Judgment normal.         Assessment:       1. Annual visit for general adult medical examination with abnormal findings    2. Screening for colon cancer    3. Increased liver enzymes    4. Anxiety        Plan:         Annual visit for general adult medical examination with abnormal findings    Screening for colon cancer  -     Fecal  Immunochemical Test (iFOBT); Future; Expected date: 02/04/2022    Increased liver enzymes:  New problem workup needed  -     Comprehensive Metabolic Panel; Future; Expected date: 02/04/2022    Anxiety:  New Problem, no further workup needed  -     ALPRAZolam (XANAX) 0.25 MG tablet; Take 1 tablet (0.25 mg total) by mouth nightly as needed for Anxiety.  Dispense: 30 tablet; Refill: 0    [place] prescription monitoring program was checked and okshaina, alprazolam was prescribed for the patient as needed for anxiety.  The patient will repeat liver enzymes in 6 weeks, she was advised to avoid drinking any alcohol, drink plenty water and continue to eat healthy.  Will also add the vitamin-D level that was not done at the time of the lab work.   Patient agreed with assessment and plan. Patient verbalized understanding.

## 2022-03-18 ENCOUNTER — PATIENT MESSAGE (OUTPATIENT)
Dept: ADMINISTRATIVE | Facility: HOSPITAL | Age: 52
End: 2022-03-18
Payer: COMMERCIAL

## 2022-03-31 ENCOUNTER — PATIENT OUTREACH (OUTPATIENT)
Dept: ADMINISTRATIVE | Facility: OTHER | Age: 52
End: 2022-03-31
Payer: COMMERCIAL

## 2022-03-31 NOTE — PROGRESS NOTES
LINKS immunization registry updated  Care Everywhere updated  Health Maintenance updated  Chart reviewed for overdue Proactive [facility] Encounters (ARPITA) health maintenance testing (CRS, Breast Ca, Diabetic Eye Exam)   Orders entered:N/A

## 2022-05-09 ENCOUNTER — PATIENT MESSAGE (OUTPATIENT)
Dept: SMOKING CESSATION | Facility: CLINIC | Age: 52
End: 2022-05-09
Payer: COMMERCIAL

## 2022-05-31 ENCOUNTER — PATIENT MESSAGE (OUTPATIENT)
Dept: ADMINISTRATIVE | Facility: HOSPITAL | Age: 52
End: 2022-05-31
Payer: COMMERCIAL

## 2022-07-11 ENCOUNTER — PATIENT OUTREACH (OUTPATIENT)
Dept: ADMINISTRATIVE | Facility: HOSPITAL | Age: 52
End: 2022-07-11
Payer: COMMERCIAL

## 2022-07-11 ENCOUNTER — PATIENT MESSAGE (OUTPATIENT)
Dept: ADMINISTRATIVE | Facility: HOSPITAL | Age: 52
End: 2022-07-11
Payer: COMMERCIAL

## 2022-07-25 ENCOUNTER — OFFICE VISIT (OUTPATIENT)
Dept: FAMILY MEDICINE | Facility: CLINIC | Age: 52
End: 2022-07-25
Payer: COMMERCIAL

## 2022-07-25 VITALS
WEIGHT: 130.75 LBS | HEIGHT: 62 IN | DIASTOLIC BLOOD PRESSURE: 83 MMHG | HEART RATE: 66 BPM | SYSTOLIC BLOOD PRESSURE: 120 MMHG | BODY MASS INDEX: 24.06 KG/M2 | OXYGEN SATURATION: 99 %

## 2022-07-25 DIAGNOSIS — E55.9 VITAMIN D DEFICIENCY: ICD-10-CM

## 2022-07-25 DIAGNOSIS — Z12.11 SCREENING FOR COLON CANCER: ICD-10-CM

## 2022-07-25 DIAGNOSIS — L65.9 HAIR LOSS: ICD-10-CM

## 2022-07-25 DIAGNOSIS — R74.8 ABNORMAL LIVER ENZYMES: ICD-10-CM

## 2022-07-25 DIAGNOSIS — M25.50 MULTIPLE JOINT PAIN: ICD-10-CM

## 2022-07-25 DIAGNOSIS — N95.1 HOT FLUSHES, PERIMENOPAUSAL: ICD-10-CM

## 2022-07-25 DIAGNOSIS — E78.49 OTHER HYPERLIPIDEMIA: Primary | ICD-10-CM

## 2022-07-25 PROCEDURE — 3079F DIAST BP 80-89 MM HG: CPT | Mod: CPTII,S$GLB,, | Performed by: FAMILY MEDICINE

## 2022-07-25 PROCEDURE — 3074F SYST BP LT 130 MM HG: CPT | Mod: CPTII,S$GLB,, | Performed by: FAMILY MEDICINE

## 2022-07-25 PROCEDURE — 99214 PR OFFICE/OUTPT VISIT, EST, LEVL IV, 30-39 MIN: ICD-10-PCS | Mod: S$GLB,,, | Performed by: FAMILY MEDICINE

## 2022-07-25 PROCEDURE — 3074F PR MOST RECENT SYSTOLIC BLOOD PRESSURE < 130 MM HG: ICD-10-PCS | Mod: CPTII,S$GLB,, | Performed by: FAMILY MEDICINE

## 2022-07-25 PROCEDURE — 1159F PR MEDICATION LIST DOCUMENTED IN MEDICAL RECORD: ICD-10-PCS | Mod: CPTII,S$GLB,, | Performed by: FAMILY MEDICINE

## 2022-07-25 PROCEDURE — 3079F PR MOST RECENT DIASTOLIC BLOOD PRESSURE 80-89 MM HG: ICD-10-PCS | Mod: CPTII,S$GLB,, | Performed by: FAMILY MEDICINE

## 2022-07-25 PROCEDURE — 99999 PR PBB SHADOW E&M-EST. PATIENT-LVL IV: CPT | Mod: PBBFAC,,, | Performed by: FAMILY MEDICINE

## 2022-07-25 PROCEDURE — 3008F BODY MASS INDEX DOCD: CPT | Mod: CPTII,S$GLB,, | Performed by: FAMILY MEDICINE

## 2022-07-25 PROCEDURE — 99999 PR PBB SHADOW E&M-EST. PATIENT-LVL IV: ICD-10-PCS | Mod: PBBFAC,,, | Performed by: FAMILY MEDICINE

## 2022-07-25 PROCEDURE — 3008F PR BODY MASS INDEX (BMI) DOCUMENTED: ICD-10-PCS | Mod: CPTII,S$GLB,, | Performed by: FAMILY MEDICINE

## 2022-07-25 PROCEDURE — 99214 OFFICE O/P EST MOD 30 MIN: CPT | Mod: S$GLB,,, | Performed by: FAMILY MEDICINE

## 2022-07-25 PROCEDURE — 1159F MED LIST DOCD IN RCRD: CPT | Mod: CPTII,S$GLB,, | Performed by: FAMILY MEDICINE

## 2022-07-25 NOTE — PROGRESS NOTES
Assessment:       1. Other hyperlipidemia    2. Abnormal liver enzymes    3. Vitamin D deficiency    4. Hot flushes, perimenopausal    5. Hair loss    6. Multiple joint pain    7. Screening for colon cancer        Plan:       Other hyperlipidemia:  Uncontrolled  -     Comprehensive Metabolic Panel; Future; Expected date: 07/25/2022  -     Lipid Panel; Future; Expected date: 07/25/2022    Abnormal liver enzymes:  Uncontrolled  -     Comprehensive Metabolic Panel; Future; Expected date: 07/25/2022  -     Lipid Panel; Future; Expected date: 07/25/2022    Vitamin D deficiency:  Improved  -     Cancel: Vitamin D; Future; Expected date: 07/25/2022  -     Vitamin D; Future; Expected date: 07/25/2022    Hot flushes, perimenopausal:  Worsening  -     Estradiol; Future; Expected date: 07/25/2022  -     PROGESTERONE; Future; Expected date: 07/25/2022  -     TESTOSTERONE; Future; Expected date: 07/25/2022  And the  Hair loss:  Worsening    Multiple joint pain:  New problem workup needed  -     Sedimentation rate; Future; Expected date: 07/25/2022  -     C-Reactive Protein; Future; Expected date: 07/25/2022    Screening for colon cancer  -     Cologuard Screening (Multitarget Stool DNA); Future; Expected date: 07/25/2022      Will call the patient after we have the results of the test, healthy habits, avoid processed foods processed starches, drink plenty water.  Recommend to start exercising again.   Patient agreed with assessment and plan. Patient verbalized understanding.     Subjective:       Patient ID: Maria L Smyth is a 51 y.o. female.    Chief Complaint: Follow-up blood work    HPI     Hyperlipidemia:  The last cholesterol levels were elevated, the patient is taking fish oil, she is currently not taking any statins.  Total cholesterol was 295, triglycerides were 173 and the LDL cholesterol was 169.4.      Vitamin-D deficiency:  The last vitamin-D levels were in range, the patient is taking high dosage  vitamin-D.    Multiple joint pain:  Patient is complaining of hot flashes, hair loss, multiple joint pain, she wants to know how her hormone levels are she cannot see her gynecologist until October.  The patient stated the symptoms are getting worse, her periods are coming irregular.    The patient is due for colon cancer screening and she would like to have the Cologuard test.    Past medical history, past social history was reviewed and discussed with the patient.    Review of Systems   Constitutional: Negative for activity change and appetite change.   HENT: Negative for congestion and ear discharge.    Eyes: Negative for discharge and itching.   Respiratory: Negative for choking and chest tightness.    Cardiovascular: Negative for chest pain and leg swelling.   Gastrointestinal: Negative for abdominal distention and abdominal pain.   Endocrine: Positive for heat intolerance. Negative for cold intolerance.        Hair loss   Genitourinary: Negative for dysuria and flank pain.   Musculoskeletal: Positive for arthralgias. Negative for back pain.   Skin: Negative for pallor and rash.   Allergic/Immunologic: Negative for environmental allergies and food allergies.   Neurological: Negative for dizziness and facial asymmetry.   Hematological: Negative for adenopathy. Does not bruise/bleed easily.   Psychiatric/Behavioral: Positive for dysphoric mood. Negative for agitation, confusion and sleep disturbance.       Objective:      Physical Exam  Vitals and nursing note reviewed.   Constitutional:       General: She is not in acute distress.     Appearance: Normal appearance. She is well-developed and normal weight. She is not diaphoretic.   HENT:      Head: Normocephalic and atraumatic.      Right Ear: External ear normal.      Left Ear: External ear normal.      Nose: Nose normal.      Mouth/Throat:      Pharynx: No oropharyngeal exudate.   Eyes:      General: No scleral icterus.        Right eye: No discharge.          Left eye: No discharge.      Conjunctiva/sclera: Conjunctivae normal.      Pupils: Pupils are equal, round, and reactive to light.   Cardiovascular:      Rate and Rhythm: Normal rate and regular rhythm.      Heart sounds: Normal heart sounds.   Pulmonary:      Effort: Pulmonary effort is normal. No respiratory distress.      Breath sounds: Normal breath sounds. No wheezing.   Musculoskeletal:         General: No tenderness or deformity.      Cervical back: Neck supple.   Skin:     Coloration: Skin is not pale.   Neurological:      Mental Status: She is alert.      Cranial Nerves: No cranial nerve deficit.   Psychiatric:         Behavior: Behavior normal.         Thought Content: Thought content normal.         Judgment: Judgment normal.

## 2022-08-05 ENCOUNTER — LAB VISIT (OUTPATIENT)
Dept: LAB | Facility: HOSPITAL | Age: 52
End: 2022-08-05
Attending: FAMILY MEDICINE
Payer: COMMERCIAL

## 2022-08-05 DIAGNOSIS — E55.9 VITAMIN D DEFICIENCY: ICD-10-CM

## 2022-08-05 DIAGNOSIS — R74.8 ABNORMAL LIVER ENZYMES: ICD-10-CM

## 2022-08-05 DIAGNOSIS — N95.1 HOT FLUSHES, PERIMENOPAUSAL: ICD-10-CM

## 2022-08-05 DIAGNOSIS — M25.50 MULTIPLE JOINT PAIN: ICD-10-CM

## 2022-08-05 DIAGNOSIS — E78.49 OTHER HYPERLIPIDEMIA: ICD-10-CM

## 2022-08-05 LAB
25(OH)D3+25(OH)D2 SERPL-MCNC: 43 NG/ML (ref 30–96)
ALBUMIN SERPL BCP-MCNC: 4 G/DL (ref 3.5–5.2)
ALP SERPL-CCNC: 55 U/L (ref 55–135)
ALT SERPL W/O P-5'-P-CCNC: 34 U/L (ref 10–44)
ANION GAP SERPL CALC-SCNC: 8 MMOL/L (ref 8–16)
AST SERPL-CCNC: 26 U/L (ref 10–40)
BILIRUB SERPL-MCNC: 0.6 MG/DL (ref 0.1–1)
BUN SERPL-MCNC: 15 MG/DL (ref 6–20)
CALCIUM SERPL-MCNC: 9 MG/DL (ref 8.7–10.5)
CHLORIDE SERPL-SCNC: 107 MMOL/L (ref 95–110)
CHOLEST SERPL-MCNC: 280 MG/DL (ref 120–199)
CHOLEST/HDLC SERPL: 3.3 {RATIO} (ref 2–5)
CO2 SERPL-SCNC: 23 MMOL/L (ref 23–29)
CREAT SERPL-MCNC: 0.8 MG/DL (ref 0.5–1.4)
CRP SERPL-MCNC: 3.3 MG/L (ref 0–8.2)
ERYTHROCYTE [SEDIMENTATION RATE] IN BLOOD BY PHOTOMETRIC METHOD: <2 MM/HR (ref 0–36)
EST. GFR  (NO RACE VARIABLE): >60 ML/MIN/1.73 M^2
ESTRADIOL SERPL-MCNC: 15 PG/ML
GLUCOSE SERPL-MCNC: 103 MG/DL (ref 70–110)
HDLC SERPL-MCNC: 85 MG/DL (ref 40–75)
HDLC SERPL: 30.4 % (ref 20–50)
LDLC SERPL CALC-MCNC: 175.6 MG/DL (ref 63–159)
NONHDLC SERPL-MCNC: 195 MG/DL
POTASSIUM SERPL-SCNC: 4.6 MMOL/L (ref 3.5–5.1)
PROGEST SERPL-MCNC: 0.6 NG/ML
PROT SERPL-MCNC: 6.8 G/DL (ref 6–8.4)
SODIUM SERPL-SCNC: 138 MMOL/L (ref 136–145)
TESTOST SERPL-MCNC: 31 NG/DL (ref 5–73)
TRIGL SERPL-MCNC: 97 MG/DL (ref 30–150)

## 2022-08-05 PROCEDURE — 80053 COMPREHEN METABOLIC PANEL: CPT | Performed by: FAMILY MEDICINE

## 2022-08-05 PROCEDURE — 82670 ASSAY OF TOTAL ESTRADIOL: CPT | Performed by: FAMILY MEDICINE

## 2022-08-05 PROCEDURE — 80061 LIPID PANEL: CPT | Performed by: FAMILY MEDICINE

## 2022-08-05 PROCEDURE — 86140 C-REACTIVE PROTEIN: CPT | Performed by: FAMILY MEDICINE

## 2022-08-05 PROCEDURE — 82306 VITAMIN D 25 HYDROXY: CPT | Performed by: FAMILY MEDICINE

## 2022-08-05 PROCEDURE — 84144 ASSAY OF PROGESTERONE: CPT | Performed by: FAMILY MEDICINE

## 2022-08-05 PROCEDURE — 84403 ASSAY OF TOTAL TESTOSTERONE: CPT | Performed by: FAMILY MEDICINE

## 2022-08-05 PROCEDURE — 85652 RBC SED RATE AUTOMATED: CPT | Performed by: FAMILY MEDICINE

## 2022-08-05 PROCEDURE — 36415 COLL VENOUS BLD VENIPUNCTURE: CPT | Mod: PO | Performed by: FAMILY MEDICINE

## 2022-08-09 DIAGNOSIS — M77.12 LATERAL EPICONDYLITIS OF LEFT ELBOW: Primary | ICD-10-CM

## 2022-08-11 ENCOUNTER — OFFICE VISIT (OUTPATIENT)
Dept: ORTHOPEDICS | Facility: CLINIC | Age: 52
End: 2022-08-11
Payer: COMMERCIAL

## 2022-08-11 ENCOUNTER — HOSPITAL ENCOUNTER (OUTPATIENT)
Dept: RADIOLOGY | Facility: HOSPITAL | Age: 52
Discharge: HOME | End: 2022-08-11
Attending: ORTHOPAEDIC SURGERY
Payer: COMMERCIAL

## 2022-08-11 DIAGNOSIS — M77.12 LATERAL EPICONDYLITIS OF LEFT ELBOW: Primary | ICD-10-CM

## 2022-08-11 DIAGNOSIS — M77.12 LATERAL EPICONDYLITIS OF LEFT ELBOW: ICD-10-CM

## 2022-08-11 PROCEDURE — 1159F PR MEDICATION LIST DOCUMENTED IN MEDICAL RECORD: ICD-10-PCS | Mod: CPTII,S$GLB,, | Performed by: ORTHOPAEDIC SURGERY

## 2022-08-11 PROCEDURE — 20605 INTERMEDIATE JOINT ASPIRATION/INJECTION: ICD-10-PCS | Mod: LT,S$GLB,, | Performed by: ORTHOPAEDIC SURGERY

## 2022-08-11 PROCEDURE — 73080 X-RAY EXAM OF ELBOW: CPT | Mod: TC,PO,LT

## 2022-08-11 PROCEDURE — 99213 OFFICE O/P EST LOW 20 MIN: CPT | Mod: 25,S$GLB,, | Performed by: ORTHOPAEDIC SURGERY

## 2022-08-11 PROCEDURE — 1159F MED LIST DOCD IN RCRD: CPT | Mod: CPTII,S$GLB,, | Performed by: ORTHOPAEDIC SURGERY

## 2022-08-11 PROCEDURE — 73080 XR ELBOW COMPLETE 3 VIEW LEFT: ICD-10-PCS | Mod: 26,LT,, | Performed by: RADIOLOGY

## 2022-08-11 PROCEDURE — 99999 PR PBB SHADOW E&M-EST. PATIENT-LVL III: CPT | Mod: PBBFAC,,, | Performed by: ORTHOPAEDIC SURGERY

## 2022-08-11 PROCEDURE — 99999 PR PBB SHADOW E&M-EST. PATIENT-LVL III: ICD-10-PCS | Mod: PBBFAC,,, | Performed by: ORTHOPAEDIC SURGERY

## 2022-08-11 PROCEDURE — 1160F RVW MEDS BY RX/DR IN RCRD: CPT | Mod: CPTII,S$GLB,, | Performed by: ORTHOPAEDIC SURGERY

## 2022-08-11 PROCEDURE — 20605 DRAIN/INJ JOINT/BURSA W/O US: CPT | Mod: LT,S$GLB,, | Performed by: ORTHOPAEDIC SURGERY

## 2022-08-11 PROCEDURE — 73080 X-RAY EXAM OF ELBOW: CPT | Mod: 26,LT,, | Performed by: RADIOLOGY

## 2022-08-11 PROCEDURE — 99213 PR OFFICE/OUTPT VISIT, EST, LEVL III, 20-29 MIN: ICD-10-PCS | Mod: 25,S$GLB,, | Performed by: ORTHOPAEDIC SURGERY

## 2022-08-11 PROCEDURE — 1160F PR REVIEW ALL MEDS BY PRESCRIBER/CLIN PHARMACIST DOCUMENTED: ICD-10-PCS | Mod: CPTII,S$GLB,, | Performed by: ORTHOPAEDIC SURGERY

## 2022-08-11 RX ADMIN — TRIAMCINOLONE ACETONIDE 40 MG: 40 INJECTION, SUSPENSION INTRA-ARTICULAR; INTRAMUSCULAR at 03:08

## 2022-08-12 RX ORDER — TRIAMCINOLONE ACETONIDE 40 MG/ML
40 INJECTION, SUSPENSION INTRA-ARTICULAR; INTRAMUSCULAR
Status: DISCONTINUED | OUTPATIENT
Start: 2022-08-11 | End: 2022-08-12 | Stop reason: HOSPADM

## 2022-08-12 NOTE — PROCEDURES
L lateral epicondyleIntermediate Joint Aspiration/Injection    Date/Time: 8/11/2022 3:00 PM  Performed by: Vidal Lopez MD  Authorized by: Vidal Lopez MD     Consent Done?: Yes (Verbal)  Indications:  Pain  Site marked: The procedure site was marked    Timeout: Prior to procedure the correct patient, procedure, and site was verified      Location:  Elbow  Prep: Patient was prepped and draped in usual sterile fashion    Needle size:  25 G  Medications:  40 mg triamcinolone acetonide 40 mg/mL  Patient tolerance:  Patient tolerated the procedure well with no immediate complications

## 2022-08-12 NOTE — PROGRESS NOTES
Chief Complaint   Patient presents with    Left Elbow - Pain       HPI:    This is a 51 y.o. who presents today complaining of left elbow pain for 2 weeks after no interval trauma. Pain is dull. No numbness or tingling. No associated signs or symptoms.      Past Medical History:   Diagnosis Date    Abnormal Pap smear of cervix     Allergic conjunctivitis     ALLERGIC RHINITIS     Atopic dermatitis     Food allergy 9/24/2012    Immunotherapy     Migraine headache       Past Surgical History:   Procedure Laterality Date    SHOULDER ARTHROSCOPY  9/2011    right rotator cuff repair    SINUS SURGERY  1991    TONSILLECTOMY  childhood      Current Outpatient Medications on File Prior to Visit   Medication Sig Dispense Refill    B-complex with vitamin C (Z-BEC OR EQUIV) tablet Take 1 tablet by mouth once daily.      cetirizine (ZYRTEC) 10 MG tablet Take 10 mg by mouth as needed.       diclofenac sodium (SOLARAZE) 3 % gel To use to affected area twice a day (Patient taking differently: To use to affected area twice a day) 100 g 0    EPINEPHrine (EPIPEN) 0.3 mg/0.3 mL AtIn Use as directed 2 each 3    ergocalciferol (ERGOCALCIFEROL) 50,000 unit Cap Take 1 capsule (50,000 Units total) by mouth every 7 days. 12 capsule 3    fish oil-omega-3 fatty acids 300-1,000 mg capsule Take 2 g by mouth once daily.      hydrocortisone 2.5 % cream Apply topically 2 (two) times daily. 30 g 2    rizatriptan (MAXALT) 10 MG tablet Take 1 tablet (10 mg total) by mouth once as needed for Migraine. 12 tablet 11    tretinoin (RETIN-A) 0.025 % cream Apply topically every evening. 45 g 6    tretinoin (RETIN-A) 0.05 % cream Apply topically every evening. 20 g 3    ALPRAZolam (XANAX) 0.25 MG tablet Take 1 tablet (0.25 mg total) by mouth nightly as needed for Anxiety. 30 tablet 2     No current facility-administered medications on file prior to visit.      Review of patient's allergies indicates:   Allergen Reactions    Macrobid   [nitrofurantoin monohyd/m-cryst] Hives and Anaphylaxis     Other reaction(s): Throat closing    Corn Swelling    Effexor  [venlafaxine]      Other reaction(s): dysphagia    Eggs [egg derived] Hives    Wheat Swelling    Doxycycline Itching and Rash      Family History not pertinent   Social History     Socioeconomic History    Marital status:    Tobacco Use    Smoking status: Never Smoker    Smokeless tobacco: Never Used   Substance and Sexual Activity    Alcohol use: Yes     Comment: social    Drug use: No    Sexual activity: Yes     Partners: Male     Birth control/protection: None, Partner-Vasectomy   Other Topics Concern    Are you pregnant or think you may be? No    Breast-feeding No   Social History Narrative    Originally from [place].         Review of Systems:   Constitutional:  Denies fever or chills    Eyes:  Denies change in visual acuity    HENT:  Denies nasal congestion or sore throat    Respiratory:  Denies cough or shortness of breath    Cardiovascular:  Denies chest pain or edema    GI:  Denies abdominal pain, nausea, vomiting, bloody stools or diarrhea    :  Denies dysuria    Integument:  Denies rash    Neurologic:  Denies headache, focal weakness or sensory changes    Endocrine:  Denies polyuria or polydipsia    Lymphatic:  Denies swollen glands    Psychiatric:  Denies depression or anxiety       Physical Exam:    Constitutional:  Well developed, well nourished, no acute distress, non-toxic appearance    Integument:  Well hydrated, no rash    Lymphatic:  No lymphadenopathy noted    Neurologic:  Alert & oriented x 3,     Psychiatric:  Speech and behavior appropriate    Gi: abdomen soft  Eyes: EOMI   R elbow  Exam performed same as contralateral side and is normal    L elbow  Point TTP about the lateral epicondyle. Pain with wrist extension. Skin intact. Compartments soft. NVI distally.      X-rays were performed today, personally reviewed by me and findings discussed with  the patient.   3 views of the left elbow show no fractures or dislocations      There are no diagnoses linked to this encounter.      Using an aseptic technique, I injected 5 cc of lidocaine 1% without and 1 cc of kenalog 40mg into the left lateral epicondyle. The patient tolerated this well. I will have them return to clinic as needed.

## 2022-10-03 ENCOUNTER — OFFICE VISIT (OUTPATIENT)
Dept: OBSTETRICS AND GYNECOLOGY | Facility: CLINIC | Age: 52
End: 2022-10-03
Payer: COMMERCIAL

## 2022-10-03 VITALS
SYSTOLIC BLOOD PRESSURE: 130 MMHG | WEIGHT: 131.38 LBS | BODY MASS INDEX: 24.18 KG/M2 | DIASTOLIC BLOOD PRESSURE: 80 MMHG | HEIGHT: 62 IN

## 2022-10-03 DIAGNOSIS — R23.2 HOT FLASHES: ICD-10-CM

## 2022-10-03 DIAGNOSIS — N93.9 ABNORMAL UTERINE BLEEDING: ICD-10-CM

## 2022-10-03 DIAGNOSIS — N95.1 PERIMENOPAUSE: ICD-10-CM

## 2022-10-03 DIAGNOSIS — Z01.419 WELL FEMALE EXAM WITH ROUTINE GYNECOLOGICAL EXAM: Primary | ICD-10-CM

## 2022-10-03 DIAGNOSIS — Z12.31 ENCOUNTER FOR SCREENING MAMMOGRAM FOR MALIGNANT NEOPLASM OF BREAST: ICD-10-CM

## 2022-10-03 PROCEDURE — 1159F PR MEDICATION LIST DOCUMENTED IN MEDICAL RECORD: ICD-10-PCS | Mod: CPTII,S$GLB,, | Performed by: OBSTETRICS & GYNECOLOGY

## 2022-10-03 PROCEDURE — 99999 PR PBB SHADOW E&M-EST. PATIENT-LVL IV: CPT | Mod: PBBFAC,,, | Performed by: OBSTETRICS & GYNECOLOGY

## 2022-10-03 PROCEDURE — 3079F DIAST BP 80-89 MM HG: CPT | Mod: CPTII,S$GLB,, | Performed by: OBSTETRICS & GYNECOLOGY

## 2022-10-03 PROCEDURE — 1159F MED LIST DOCD IN RCRD: CPT | Mod: CPTII,S$GLB,, | Performed by: OBSTETRICS & GYNECOLOGY

## 2022-10-03 PROCEDURE — 3075F SYST BP GE 130 - 139MM HG: CPT | Mod: CPTII,S$GLB,, | Performed by: OBSTETRICS & GYNECOLOGY

## 2022-10-03 PROCEDURE — 88175 CYTOPATH C/V AUTO FLUID REDO: CPT | Performed by: OBSTETRICS & GYNECOLOGY

## 2022-10-03 PROCEDURE — 99999 PR PBB SHADOW E&M-EST. PATIENT-LVL IV: ICD-10-PCS | Mod: PBBFAC,,, | Performed by: OBSTETRICS & GYNECOLOGY

## 2022-10-03 PROCEDURE — 3079F PR MOST RECENT DIASTOLIC BLOOD PRESSURE 80-89 MM HG: ICD-10-PCS | Mod: CPTII,S$GLB,, | Performed by: OBSTETRICS & GYNECOLOGY

## 2022-10-03 PROCEDURE — 3075F PR MOST RECENT SYSTOLIC BLOOD PRESS GE 130-139MM HG: ICD-10-PCS | Mod: CPTII,S$GLB,, | Performed by: OBSTETRICS & GYNECOLOGY

## 2022-10-03 PROCEDURE — 3008F PR BODY MASS INDEX (BMI) DOCUMENTED: ICD-10-PCS | Mod: CPTII,S$GLB,, | Performed by: OBSTETRICS & GYNECOLOGY

## 2022-10-03 PROCEDURE — 1160F RVW MEDS BY RX/DR IN RCRD: CPT | Mod: CPTII,S$GLB,, | Performed by: OBSTETRICS & GYNECOLOGY

## 2022-10-03 PROCEDURE — 1160F PR REVIEW ALL MEDS BY PRESCRIBER/CLIN PHARMACIST DOCUMENTED: ICD-10-PCS | Mod: CPTII,S$GLB,, | Performed by: OBSTETRICS & GYNECOLOGY

## 2022-10-03 PROCEDURE — 3008F BODY MASS INDEX DOCD: CPT | Mod: CPTII,S$GLB,, | Performed by: OBSTETRICS & GYNECOLOGY

## 2022-10-03 PROCEDURE — 99396 PREV VISIT EST AGE 40-64: CPT | Mod: S$GLB,,, | Performed by: OBSTETRICS & GYNECOLOGY

## 2022-10-03 PROCEDURE — 99396 PR PREVENTIVE VISIT,EST,40-64: ICD-10-PCS | Mod: S$GLB,,, | Performed by: OBSTETRICS & GYNECOLOGY

## 2022-10-03 NOTE — PROGRESS NOTES
SUBJECTIVE:   51 y.o. female   for routine gyn exam. Patient's last menstrual period was 10/03/2022..  She reports regular periods until .  Had 3 episodes of bleeding this past month.  Also has bothersome hot flashes and mood swings.  Desires tx for this. Estradiol 15 in .  No hormonal contraception as partner with vasectomy.         Past Medical History:   Diagnosis Date    Abnormal Pap smear of cervix     Allergic conjunctivitis     ALLERGIC RHINITIS     Atopic dermatitis     Food allergy 2012    Immunotherapy     Migraine headache      Past Surgical History:   Procedure Laterality Date    SHOULDER ARTHROSCOPY  2011    right rotator cuff repair    SINUS SURGERY      TONSILLECTOMY  childhood     Social History     Socioeconomic History    Marital status:    Tobacco Use    Smoking status: Never    Smokeless tobacco: Never   Substance and Sexual Activity    Alcohol use: Yes     Comment: social    Drug use: No    Sexual activity: Yes     Partners: Male     Birth control/protection: None, Partner-Vasectomy   Other Topics Concern    Are you pregnant or think you may be? No    Breast-feeding No   Social History Narrative    Originally from [place].     Family History   Problem Relation Age of Onset    Allergic rhinitis Son     Allergic rhinitis Son     Asthma Son     Atopy Son     Eczema Son     Cancer Maternal Grandmother     Cancer Maternal Aunt 40        adenoca lung, breast (Non-smoker)    Diabetes Maternal Aunt     Heart disease Mother 60        s/p cabg    Retinitis pigmentosa Mother     Breast cancer Mother     Heart disease Father 62        s/p cabg    Cancer Father         bladder ca (+tob)    Diabetes Father         pre-diabetes    Cancer Maternal Grandfather         NHL    Immunodeficiency Neg Hx     Urticaria Neg Hx     Angioedema Neg Hx     Allergies Neg Hx     Rhinitis Neg Hx     Ovarian cancer Neg Hx     Colon cancer Neg Hx     Melanoma Neg Hx      OB History     Para Term  AB Living   3 3 3     3   SAB IAB Ectopic Multiple Live Births           3      # Outcome Date GA Lbr Clovis/2nd Weight Sex Delivery Anes PTL Lv   3 Term      Vag-Spont  N AMY   2 Term      Vag-Spont  N AMY   1 Term      Vag-Spont  N AMY         Current Outpatient Medications   Medication Sig Dispense Refill    B-complex with vitamin C (Z-BEC OR EQUIV) tablet Take 1 tablet by mouth once daily.      cetirizine (ZYRTEC) 10 MG tablet Take 10 mg by mouth as needed.       diclofenac sodium (SOLARAZE) 3 % gel To use to affected area twice a day (Patient taking differently: To use to affected area twice a day) 100 g 0    EPINEPHrine (EPIPEN) 0.3 mg/0.3 mL AtIn Use as directed 2 each 3    ergocalciferol (ERGOCALCIFEROL) 50,000 unit Cap Take 1 capsule (50,000 Units total) by mouth every 7 days. 12 capsule 3    fish oil-omega-3 fatty acids 300-1,000 mg capsule Take 2 g by mouth once daily.      hydrocortisone 2.5 % cream Apply topically 2 (two) times daily. 30 g 2    naloxone (NARCAN) 4 mg/actuation Spry Spray 1 spray as directed 2 each 3    rizatriptan (MAXALT) 10 MG tablet Take 1 tablet (10 mg total) by mouth once as needed for Migraine. 12 tablet 11    tretinoin (RETIN-A) 0.025 % cream Apply topically every evening. 45 g 6    tretinoin (RETIN-A) 0.05 % cream Apply topically every evening. 20 g 3     No current facility-administered medications for this visit.     Allergies: Macrobid  [nitrofurantoin monohyd/m-cryst], Corn, Effexor  [venlafaxine], Eggs [egg derived], Wheat, and Doxycycline     ROS:  Constitutional: no weight loss, weight gain, fever, fatigue  Eyes:  No vision changes, glasses/contacts  ENT/Mouth: No ulcers, sinus problems, ears ringing, headache  Cardiovascular: No inability to lie flat, chest pain, exercise intolerance, swelling, heart palpitations  Respiratory: No wheezing, coughing blood, shortness of breath, or cough  Gastrointestinal: No diarrhea, bloody stool, nausea/vomiting,  "constipation, gas, hemorrhoids  Genitourinary: No blood in urine, painful urination, urgency of urination, frequency of urination, incomplete emptying, incontinence, +abnormal bleeding, painful periods, heavy periods, vaginal discharge, vaginal odor, painful intercourse, sexual problems, bleeding after intercourse.  Musculoskeletal: No muscle weakness  Skin/Breast: No painful breasts, nipple discharge, masses, rash, ulcers  Neurological: No passing out, seizures, numbness, headache  Endocrine: No diabetes, hypothyroid, hyperthyroid, +hot flashes, hair loss, abnormal hair growth, acne  Psychiatric: No depression, crying  Hematologic: No bruises, bleeding, swollen lymph nodes, anemia.      OBJECTIVE:   The patient appears well, alert, oriented x 3, in no distress.  /80 (BP Location: Right arm, Patient Position: Sitting, BP Method: Medium (Manual))   Ht 5' 2" (1.575 m)   Wt 59.6 kg (131 lb 6.3 oz)   LMP 10/03/2022   BMI 24.03 kg/m²   NECK: no thyromegaly, trachea midline  SKIN: no acne, striae, hirsutism  CHEST: CTAB  CV: RRR  BREAST EXAM: breasts appear normal, no suspicious masses, no skin or nipple changes or axillary nodes  ABDOMEN: no hernias, masses, or hepatosplenomegaly  GENITALIA: normal external genitalia, no erythema, no discharge  URETHRA: normal urethra, normal urethral meatus  VAGINA: Normal  CERVIX: no lesions or cervical motion tenderness  UTERUS: normal size, contour, position, consistency, mobility, non-tender  ADNEXA: no mass, fullness, tenderness      ASSESSMENT:   1. Well female exam with routine gynecological exam  Liquid-Based Pap Smear, Screening      2. Encounter for screening mammogram for malignant neoplasm of breast  Mammo Digital Screening Bilat w/ Isaac      3. Abnormal uterine bleeding  US Pelvis Comp with Transvag NON-OB (xpd      4. Hot flashes        5. Perimenopause            PLAN:   Orders Placed This Encounter    Mammo Digital Screening Bilat w/ Isaac    US Pelvis Comp with " Transvag NON-OB (xpd    Liquid-Based Pap Smear, Screening     Discussed AUB, pelvic u/s for em stripe. Consider Embx. Then can discuss mgt options.  Discussed menopause= ovarian failure, cessation of periods for at least one year. Had low estradiol 8-2022.    Presumed having perimenopausal sx (since still having periods)- decreasing ovarian production and fluctuating hormone levels.  After AUB evaluated, can discuss mgt options. May consider low dose OCP ie: lo-loestrin. Could consider cyclic HRT with daily estrogen and then progesterone for 2 weeks on and then stop.  RTC after u/s  Discussed healthy lifestyle including regular exercise, healthy eating, etc.  Return to clinic in 1 year

## 2022-10-10 ENCOUNTER — PATIENT MESSAGE (OUTPATIENT)
Dept: ADMINISTRATIVE | Facility: HOSPITAL | Age: 52
End: 2022-10-10
Payer: COMMERCIAL

## 2022-10-10 ENCOUNTER — PATIENT OUTREACH (OUTPATIENT)
Dept: ADMINISTRATIVE | Facility: HOSPITAL | Age: 52
End: 2022-10-10
Payer: COMMERCIAL

## 2022-10-10 LAB
FINAL PATHOLOGIC DIAGNOSIS: NORMAL
Lab: NORMAL

## 2022-10-10 NOTE — LETTER
October 17, 2022    Maria L Smyth  [address] [facility] [address]             [facility]  [address]  [facility] [address]  Phone: [phone] Dear Maria L Smyth     Our records indicate that your health care provider ordered Cologuard, a colorectal cancer (CRC) screening option to use at-home, but you have not yet completed and returned the kit to [employer] to be processed. Please follow the steps below to complete your Cologuard screening:     Plan to collect your sample when you can get it back to [employer] that same day or the next day.  Choose the no-cost return option that works best for you:   Ask for a contact-free [employer] pick-up.  Call us at [phone] for help or visit [facility] to schedule it on your own.  Drop it off at [employer]. Visit [facility] to see your local options and hours. Remember, some places are closed on Sundays or holidays.   Wait to hear from your health care provider. [employer] will send the result of your test to your health care provider in a few weeks. Your provider will then share the result with you.     Your health is important to us, and early detection through screening is key. Please complete and return your collection kit at your earliest convenience.      Ana Paula Chandler MD and your [facility]

## 2022-10-10 NOTE — PROGRESS NOTES
COLOGUARD DISPENSED NOT RETURNED    Pt reminded to return Cologuard ASAP    No answer no Voicemail    Portal message sent

## 2022-10-12 ENCOUNTER — HOSPITAL ENCOUNTER (OUTPATIENT)
Dept: RADIOLOGY | Facility: HOSPITAL | Age: 52
Discharge: HOME | End: 2022-10-12
Attending: OBSTETRICS & GYNECOLOGY
Payer: COMMERCIAL

## 2022-10-12 DIAGNOSIS — N93.9 ABNORMAL UTERINE BLEEDING: ICD-10-CM

## 2022-10-12 PROCEDURE — 76830 TRANSVAGINAL US NON-OB: CPT | Mod: TC,PO

## 2022-10-12 PROCEDURE — 76856 US PELVIS COMP WITH TRANSVAG NON-OB (XPD): ICD-10-PCS | Mod: 26,,, | Performed by: RADIOLOGY

## 2022-10-12 PROCEDURE — 76830 US PELVIS COMP WITH TRANSVAG NON-OB (XPD): ICD-10-PCS | Mod: 26,,, | Performed by: RADIOLOGY

## 2022-10-12 PROCEDURE — 76830 TRANSVAGINAL US NON-OB: CPT | Mod: 26,,, | Performed by: RADIOLOGY

## 2022-10-12 PROCEDURE — 76856 US EXAM PELVIC COMPLETE: CPT | Mod: 26,,, | Performed by: RADIOLOGY

## 2022-10-20 ENCOUNTER — PATIENT MESSAGE (OUTPATIENT)
Dept: OBSTETRICS AND GYNECOLOGY | Facility: CLINIC | Age: 52
End: 2022-10-20

## 2022-10-20 ENCOUNTER — OFFICE VISIT (OUTPATIENT)
Dept: OBSTETRICS AND GYNECOLOGY | Facility: CLINIC | Age: 52
End: 2022-10-20
Attending: OBSTETRICS & GYNECOLOGY
Payer: COMMERCIAL

## 2022-10-20 DIAGNOSIS — N95.1 PERIMENOPAUSE: ICD-10-CM

## 2022-10-20 DIAGNOSIS — N93.9 ABNORMAL UTERINE BLEEDING: Primary | ICD-10-CM

## 2022-10-20 PROCEDURE — 1160F PR REVIEW ALL MEDS BY PRESCRIBER/CLIN PHARMACIST DOCUMENTED: ICD-10-PCS | Mod: CPTII,95,, | Performed by: OBSTETRICS & GYNECOLOGY

## 2022-10-20 PROCEDURE — 1159F MED LIST DOCD IN RCRD: CPT | Mod: CPTII,95,, | Performed by: OBSTETRICS & GYNECOLOGY

## 2022-10-20 PROCEDURE — 99213 OFFICE O/P EST LOW 20 MIN: CPT | Mod: 95,,, | Performed by: OBSTETRICS & GYNECOLOGY

## 2022-10-20 PROCEDURE — 1159F PR MEDICATION LIST DOCUMENTED IN MEDICAL RECORD: ICD-10-PCS | Mod: CPTII,95,, | Performed by: OBSTETRICS & GYNECOLOGY

## 2022-10-20 PROCEDURE — 1160F RVW MEDS BY RX/DR IN RCRD: CPT | Mod: CPTII,95,, | Performed by: OBSTETRICS & GYNECOLOGY

## 2022-10-20 PROCEDURE — 99213 PR OFFICE/OUTPT VISIT, EST, LEVL III, 20-29 MIN: ICD-10-PCS | Mod: 95,,, | Performed by: OBSTETRICS & GYNECOLOGY

## 2022-10-20 RX ORDER — NORETHINDRONE ACETATE AND ETHINYL ESTRADIOL, ETHINYL ESTRADIOL AND FERROUS FUMARATE 1MG-10(24)
1 KIT ORAL DAILY
Qty: 90 TABLET | Refills: 1 | Status: SHIPPED | OUTPATIENT
Start: 2022-10-20 | End: 2023-05-08 | Stop reason: ALTCHOICE

## 2022-10-20 NOTE — PROGRESS NOTES
The patient location is: [place]  The chief complaint leading to consultation is: AUB, mood swings, hormones  Visit type: Virtual visit with synchronous audio and video  Total time spent with patient: 20 minutes  Each patient to whom he or she provides medical services by telemedicine is:  (1) informed of the relationship between the physician and patient and the respective role of any other health care provider with respect to management of the patient; and (2) notified that he or she may decline to receive medical services by telemedicine and may withdraw from such care at any time.      SUBJECTIVE:   51 y.o. female   for gyn cincerns. Patient's last menstrual period was 10/03/2022..  She was seen .  She reported regular periods until .  Had 3 episodes of bleeding this past month.  Also has bothersome hot flashes and mood swings.  Desires tx for this. Estradiol 15 in .  No hormonal contraception as partner with vasectomy.  Pelvic u/s shows normal em stripe. 2 cm fibroid.         Past Medical History:   Diagnosis Date    Abnormal Pap smear of cervix     Allergic conjunctivitis     ALLERGIC RHINITIS     Atopic dermatitis     Food allergy 2012    Immunotherapy     Migraine headache      Past Surgical History:   Procedure Laterality Date    SHOULDER ARTHROSCOPY  2011    right rotator cuff repair    SINUS SURGERY      TONSILLECTOMY  childhood     Social History     Socioeconomic History    Marital status:    Tobacco Use    Smoking status: Never    Smokeless tobacco: Never   Substance and Sexual Activity    Alcohol use: Yes     Comment: social    Drug use: No    Sexual activity: Yes     Partners: Male     Birth control/protection: None, Partner-Vasectomy   Other Topics Concern    Are you pregnant or think you may be? No    Breast-feeding No   Social History Narrative    Originally from [place].     Family History   Problem Relation Age of Onset    Allergic rhinitis Son      Allergic rhinitis Son     Asthma Son     Atopy Son     Eczema Son     Cancer Maternal Grandmother     Cancer Maternal Aunt 40        adenoca lung, breast (Non-smoker)    Diabetes Maternal Aunt     Heart disease Mother 60        s/p cabg    Retinitis pigmentosa Mother     Breast cancer Mother     Heart disease Father 62        s/p cabg    Cancer Father         bladder ca (+tob)    Diabetes Father         pre-diabetes    Cancer Maternal Grandfather         NHL    Immunodeficiency Neg Hx     Urticaria Neg Hx     Angioedema Neg Hx     Allergies Neg Hx     Rhinitis Neg Hx     Ovarian cancer Neg Hx     Colon cancer Neg Hx     Melanoma Neg Hx      OB History    Para Term  AB Living   3 3 3     3   SAB IAB Ectopic Multiple Live Births           3      # Outcome Date GA Lbr Clovis/2nd Weight Sex Delivery Anes PTL Lv   3 Term      Vag-Spont  N AMY   2 Term      Vag-Spont  N AMY   1 Term      Vag-Spont  N AMY         Current Outpatient Medications   Medication Sig Dispense Refill    B-complex with vitamin C (Z-BEC OR EQUIV) tablet Take 1 tablet by mouth once daily.      cetirizine (ZYRTEC) 10 MG tablet Take 10 mg by mouth as needed.       diclofenac sodium (SOLARAZE) 3 % gel To use to affected area twice a day (Patient taking differently: To use to affected area twice a day) 100 g 0    EPINEPHrine (EPIPEN) 0.3 mg/0.3 mL AtIn Use as directed 2 each 3    ergocalciferol (ERGOCALCIFEROL) 50,000 unit Cap Take 1 capsule (50,000 Units total) by mouth every 7 days. 12 capsule 3    fish oil-omega-3 fatty acids 300-1,000 mg capsule Take 2 g by mouth once daily.      hydrocortisone 2.5 % cream Apply topically 2 (two) times daily. 30 g 2    naloxone (NARCAN) 4 mg/actuation Spry Spray 1 spray as directed 2 each 3    norethindrone-e.estradioL-iron (LO LOESTRIN FE) 1 mg-10 mcg (24)/10 mcg (2) Tab Take 1 tablet by mouth once a day 90 tablet 1    rizatriptan (MAXALT) 10 MG tablet Take 1 tablet (10 mg total) by mouth once as  needed for Migraine. 12 tablet 11    tretinoin (RETIN-A) 0.025 % cream Apply topically every evening. 45 g 6    tretinoin (RETIN-A) 0.05 % cream Apply topically every evening. 20 g 3     No current facility-administered medications for this visit.     Allergies: Macrobid  [nitrofurantoin monohyd/m-cryst], Corn, Effexor  [venlafaxine], Eggs [egg derived], Wheat, and Doxycycline     ROS:  Constitutional: no weight loss, weight gain, fever, fatigue  Eyes:  No vision changes, glasses/contacts  ENT/Mouth: No ulcers, sinus problems, ears ringing, headache  Cardiovascular: No inability to lie flat, chest pain, exercise intolerance, swelling, heart palpitations  Respiratory: No wheezing, coughing blood, shortness of breath, or cough  Gastrointestinal: No diarrhea, bloody stool, nausea/vomiting, constipation, gas, hemorrhoids  Genitourinary: No blood in urine, painful urination, urgency of urination, frequency of urination, incomplete emptying, incontinence, +abnormal bleeding, painful periods, heavy periods, vaginal discharge, vaginal odor, painful intercourse, sexual problems, bleeding after intercourse.  Musculoskeletal: No muscle weakness  Skin/Breast: No painful breasts, nipple discharge, masses, rash, ulcers  Neurological: No passing out, seizures, numbness, headache  Endocrine: No diabetes, hypothyroid, hyperthyroid, +hot flashes, hair loss, abnormal hair growth, ance  Psychiatric: No depression, crying  Hematologic: No bruises, bleeding, swollen lymph nodes, anemia.      OBJECTIVE:   The patient appears well, alert, oriented x 3, in no distress.      ASSESSMENT:   1. Abnormal uterine bleeding        2. Perimenopause            PLAN:   Orders Placed This Encounter    norethindrone-e.estradioL-iron (LO LOESTRIN FE) 1 mg-10 mcg (24)/10 mcg (2) Tab     Discussed perimenopause, transitioning to menopause, pelvic u/s, small fibroid, and sx.  Options for mgt.  Trial of low dose OCP's for 3 months. IF AUB persists, may need  Embx.  Could also consider combination HRT.  Return to clinic 3 months or prn

## 2022-11-29 NOTE — PROGRESS NOTES
Maria L Smyth, a 51 y.o. female, is here for evaluation of left elbow pain.     HISTORY OF PRESENT ILLNESS  Hand dominance: right    Location: lateral elbow, left  Onset: chronic x1 year   Palliative:    Relative rest   Oral analgesics   Bracing - milli strap, no relief   HEP- massage, stretching, strengthening  CSI x3 totally, most recent 8/11/22 Dr. Lopez- chetanp relief with the first 2, most recent CSI was not helpful and pain has increased  Provocative:   ADLs   Gripping, grasping, wrist extension  Prior: none  Progression: worsening discomfort  Quality: aching, intermittent sharp pains at rest  Radiation: Denies numbness, tingling, radiation, and neck pain or radicular symptoms.  Severity: per nursing documentation  Timing: intermittent w/ use  Trauma: none    Review of systems (ROS):  A 10+ review of systems was performed with pertinent positives and negatives noted above in the history of present illness. Other systems were negative unless otherwise specified.      PHYSICAL EXAMINATION  General: Patient appears alert and oriented x 3.  Mood is pleasant.  Observation of ears, eyes and nose reveal no gross abnormalities. HEENT: NCAT, sclera nonicteric  Lungs: Respirations are equal and unlabored.  Well nourished, in no acute distress and ambulates with a non-antalgic gait with no assistive devices.    Skin: Skin intact bilaterally. Sensation intact bilaterally. Compartments soft. No evidence of edema, infection, or induration.     ELBOW EXAMINATION    Observation/Inspection  Swelling  none    Deformity  none  Discoloration  none     Scars   none    Atrophy  none    Tenderness / Crepitus (T/C):          T / C        Medial epicondyle   - / -    Med. (Ulnar) collateral ligament  - / -    Flexor pronator Musculature   - / -   Biceps tendon    - / -   Head of radius    - / -    Lateral epicondyle   +/ -    Extensor Musculature   - / -   Brachioradialis    - / -   Triceps tendon   - / -   Triceps muscle   - / -    Olecranon    - / -   Olecranon bursa   - / -   Cubital fossa    - / -   Anterior jointline   - / -   Radial tunnel    - / -             ROM: ('*' = with pain)    Right Elbow  AROM (PROM)     Extension   0 deg  (5 deg)   Flexion   145 deg (145 deg)   Pronation  90 deg  (90 deg)  Supination   80 deg  (80 deg)     Left Elbow  AROM (PROM)   Extension   0 deg  (5 deg)  Flexion   145 deg (145 deg)   Pronation  90 deg  (90 deg)   Supination   80 deg  (80 deg)      Right Wrist  AROM (PROM)   Extension   80 deg (85 deg)  Flexion   80 deg (85 deg)   Ulnar Deviation   35 deg (40 deg)  Radial Deviation 35 deg (40 deg)     Left Wrist   AROM (PROM)  Extension   80 deg (85 deg)  Flexion   80 deg (85 deg)   Ulnar Deviation   35 deg (40 deg)  Radial Deviation 35 deg (40 deg)     Strength: ('*' = with pain)    Elbow Flexion   5/5  Elbow Extension  5/5  Wrist Flexion   5/5  Wrist Extension  5/5      5/5  Intrinsics   5/5  EPL (Ext. pollicis longus) 5/5  Pinch Mechanism  5/5    Elbow Examination:  See above noted areas of tenderness.   Test for Ligamentous Instability - UCL normal  Test for Ligamentous Instability - LUCL normal  PLRI       -  Tinel's (Percussion) Test - Cubital  -  Tennis Elbow Test    -  Golfer's Elbow Test    -  Radial Capitellar Grind Test   -  Valgus/Extension Overload Test  -  Resisted Long Finger Extension Pain -  Moving Valgus    -  Forearm pain with resisted supination -  Yeargeson' s (elbow pain)   -  Hook test     -    Wrist Examination:  See above noted areas of tenderness.   Finkelstein's Test    -  Tinel's Test - Carpal Tunnel   -  Phalen's Test     -  Median Nerve Compression Test  -  Ulnar-sided Compression Test  -  LT Ballottment Test    -  Snuff box tenderness    -  Jeronimo's Test     -  LT Instability     -  Hook of Hamate Tenderness   -     Extremity Neuro-vascular Testing: Sensation grossly intact to light touch all dermatomal regions. DTR 2+ Biceps, Triceps, BR and Negative Jayy's sign.  Grossly intact motor function at Elbow, Wrist and Hand. Distal pulses radial and ulnar 2+, brisk cap refill, symmetric.    Other Findings:    ASSESSMENT & PLAN   Assessment  #1 lateral epicondylitis, left / nd    No evidence of osseous pathology  No evidence of neurologic pathology  No evidence of vascular pathology    Imaging studies reviewed:   X-ray elbow, left 22.12    Plan    We discussed options including    Watchful waiting / relative rest x   Physical therapy    Injection therapy Prpi CETendon series left   Consultation    The patient chooses As above   x = prescribed  CSI = corticosteroid injection  VSI = viscosupplement injection  PRPI = platelet rich plasma injection  ia = intra articular  R = right  L = left  B = bilateral   nfSx = surgical consultation was recommended, but patient is not interested in consultation at this time    Physical Therapy        Formal (fPT), @ [facility] facility    Formal (fPT), @ [facility] facility        Homegoing (hgPT), per concurrent fPT recommendations    Homegoing (hgPT), per prior fPT recommendations    Homegoing (hgPT), handout provided        w/  (atPT)    [blank] = not prescribed  x = prescribed  b = prescribed, and begin as indicated  t = continue as indicated  r = prescribed, and restart as indicated  p = completed prior as indicated  hs = prescribed, and with high school   col = prescribed, and with college or university   nfPT = physical therapy was recommended, but patient is not interested in PT at this time    Activity (e.g. sports, work) restrictions    [blank] = as tolerated  pt = per physical therapist  at = per   NWB = non weight bearing on affected lower extremity, with crutches assistance for ambulation    Bracing    [blank] = not prescribed  r = recommended, but not fit with at todays visit  f = prescribed and fit with at todays visit  t = continue as indicated  d = d/c  p = as needed  rare = use  on rare, as-needed basis; advised against chronic use    Pain management    [blank] = No prescription necessary. A handout detailing dosing of appropriate   over-the-counter musculoskeletal analgesics was made available to the patient.   m = meloxicam x 14 days  mp = 14 day course of meloxicam prescribed prior    Follow up    [blank] = as needed  [number] = in [number] weeks  CSI = for corticosteroid injection  VSI = for viscosupplement injection or injection series  PRP = for platelet rich plasma injection or injection series  MRI = after MRI imaging  ns = should surgical options be deferred (no surgery)  o = appointment offered, deferred by patient    Should symptoms worsen or fail to resolve, consider    Revisiting the above options and / or      Vocation:

## 2022-12-01 ENCOUNTER — OFFICE VISIT (OUTPATIENT)
Dept: SPORTS MEDICINE | Facility: CLINIC | Age: 52
End: 2022-12-01
Payer: COMMERCIAL

## 2022-12-01 VITALS
BODY MASS INDEX: 24.11 KG/M2 | DIASTOLIC BLOOD PRESSURE: 84 MMHG | WEIGHT: 131 LBS | HEIGHT: 62 IN | SYSTOLIC BLOOD PRESSURE: 129 MMHG

## 2022-12-01 DIAGNOSIS — M77.12 LEFT LATERAL EPICONDYLITIS: Primary | ICD-10-CM

## 2022-12-01 DIAGNOSIS — R29.898 DECREASED GRIP STRENGTH OF LEFT HAND: ICD-10-CM

## 2022-12-01 DIAGNOSIS — M67.80 TENDINOSIS: ICD-10-CM

## 2022-12-01 PROCEDURE — 1159F PR MEDICATION LIST DOCUMENTED IN MEDICAL RECORD: ICD-10-PCS | Mod: CPTII,S$GLB,, | Performed by: FAMILY MEDICINE

## 2022-12-01 PROCEDURE — 3074F SYST BP LT 130 MM HG: CPT | Mod: CPTII,S$GLB,, | Performed by: FAMILY MEDICINE

## 2022-12-01 PROCEDURE — 99204 OFFICE O/P NEW MOD 45 MIN: CPT | Mod: S$GLB,,, | Performed by: FAMILY MEDICINE

## 2022-12-01 PROCEDURE — 99204 PR OFFICE/OUTPT VISIT, NEW, LEVL IV, 45-59 MIN: ICD-10-PCS | Mod: S$GLB,,, | Performed by: FAMILY MEDICINE

## 2022-12-01 PROCEDURE — 3079F DIAST BP 80-89 MM HG: CPT | Mod: CPTII,S$GLB,, | Performed by: FAMILY MEDICINE

## 2022-12-01 PROCEDURE — 3074F PR MOST RECENT SYSTOLIC BLOOD PRESSURE < 130 MM HG: ICD-10-PCS | Mod: CPTII,S$GLB,, | Performed by: FAMILY MEDICINE

## 2022-12-01 PROCEDURE — 99999 PR PBB SHADOW E&M-EST. PATIENT-LVL III: CPT | Mod: PBBFAC,,, | Performed by: FAMILY MEDICINE

## 2022-12-01 PROCEDURE — 99999 PR PBB SHADOW E&M-EST. PATIENT-LVL III: ICD-10-PCS | Mod: PBBFAC,,, | Performed by: FAMILY MEDICINE

## 2022-12-01 PROCEDURE — 3008F PR BODY MASS INDEX (BMI) DOCUMENTED: ICD-10-PCS | Mod: CPTII,S$GLB,, | Performed by: FAMILY MEDICINE

## 2022-12-01 PROCEDURE — 1160F RVW MEDS BY RX/DR IN RCRD: CPT | Mod: CPTII,S$GLB,, | Performed by: FAMILY MEDICINE

## 2022-12-01 PROCEDURE — 3079F PR MOST RECENT DIASTOLIC BLOOD PRESSURE 80-89 MM HG: ICD-10-PCS | Mod: CPTII,S$GLB,, | Performed by: FAMILY MEDICINE

## 2022-12-01 PROCEDURE — 1159F MED LIST DOCD IN RCRD: CPT | Mod: CPTII,S$GLB,, | Performed by: FAMILY MEDICINE

## 2022-12-01 PROCEDURE — 3008F BODY MASS INDEX DOCD: CPT | Mod: CPTII,S$GLB,, | Performed by: FAMILY MEDICINE

## 2022-12-01 PROCEDURE — 1160F PR REVIEW ALL MEDS BY PRESCRIBER/CLIN PHARMACIST DOCUMENTED: ICD-10-PCS | Mod: CPTII,S$GLB,, | Performed by: FAMILY MEDICINE

## 2022-12-14 ENCOUNTER — PATIENT MESSAGE (OUTPATIENT)
Dept: OPTOMETRY | Facility: CLINIC | Age: 52
End: 2022-12-14
Payer: COMMERCIAL

## 2022-12-20 ENCOUNTER — OFFICE VISIT (OUTPATIENT)
Dept: OPTOMETRY | Facility: CLINIC | Age: 52
End: 2022-12-20
Payer: COMMERCIAL

## 2022-12-20 DIAGNOSIS — Z01.00 EXAMINATION OF EYES AND VISION: Primary | ICD-10-CM

## 2022-12-20 DIAGNOSIS — Z46.0 CONTACT LENS/GLASSES FITTING: Primary | ICD-10-CM

## 2022-12-20 DIAGNOSIS — H52.13 MYOPIA WITH ASTIGMATISM AND PRESBYOPIA, BILATERAL: ICD-10-CM

## 2022-12-20 DIAGNOSIS — H52.4 MYOPIA WITH ASTIGMATISM AND PRESBYOPIA, BILATERAL: ICD-10-CM

## 2022-12-20 DIAGNOSIS — H52.203 MYOPIA WITH ASTIGMATISM AND PRESBYOPIA, BILATERAL: ICD-10-CM

## 2022-12-20 DIAGNOSIS — Z46.0 CONTACT LENS/GLASSES FITTING: ICD-10-CM

## 2022-12-20 PROCEDURE — 99499 NO LOS: ICD-10-PCS | Mod: S$GLB,,, | Performed by: OPTOMETRIST

## 2022-12-20 PROCEDURE — 92015 PR REFRACTION: ICD-10-PCS | Mod: S$GLB,,, | Performed by: OPTOMETRIST

## 2022-12-20 PROCEDURE — 92014 PR EYE EXAM, EST PATIENT,COMPREHESV: ICD-10-PCS | Mod: S$GLB,,, | Performed by: OPTOMETRIST

## 2022-12-20 PROCEDURE — 92014 COMPRE OPH EXAM EST PT 1/>: CPT | Mod: S$GLB,,, | Performed by: OPTOMETRIST

## 2022-12-20 PROCEDURE — 92015 DETERMINE REFRACTIVE STATE: CPT | Mod: S$GLB,,, | Performed by: OPTOMETRIST

## 2022-12-20 PROCEDURE — 92310 PR CONTACT LENS FITTING (NO CHANGE): ICD-10-PCS | Mod: S$GLB,,, | Performed by: OPTOMETRIST

## 2022-12-20 PROCEDURE — 92310 CONTACT LENS FITTING OU: CPT | Mod: S$GLB,,, | Performed by: OPTOMETRIST

## 2022-12-20 PROCEDURE — 99499 UNLISTED E&M SERVICE: CPT | Mod: S$GLB,,, | Performed by: OPTOMETRIST

## 2022-12-20 PROCEDURE — 99999 PR PBB SHADOW E&M-EST. PATIENT-LVL III: CPT | Mod: PBBFAC,,, | Performed by: OPTOMETRIST

## 2022-12-20 PROCEDURE — 99999 PR PBB SHADOW E&M-EST. PATIENT-LVL III: ICD-10-PCS | Mod: PBBFAC,,, | Performed by: OPTOMETRIST

## 2022-12-20 NOTE — PATIENT INSTRUCTIONS
"DRY EYES -- BURNING OR BERRY SYMPTOMS:  Use Over The Counter artificial tears as needed for dry eye symptoms.   Some common brands include:  Systane, Optive, Refresh, and Thera-Tears.  These drops can be used as frequently as desired, but may be most helpful use during long periods of concentrated work.  For example, reading / working at the computer. Start with 3-4x per day.     Nighttime Ophthalmic gel or ointments are available: Refresh PM, Genteal, and Lacrilube.    Avoid drops that "get redness out" (Visine, Murine, Clear Eyes), as these may contain medication that could further irritate the eyes, especially with chronic use.    ALLERGY EYES -- ITCHING SYMPTOMS:  Over the counter medications include--Pataday, Zaditor, and Alaway.  Use as directed 1-2 drops daily for symptoms of itching / watering eyes.  These drops will not help for dry eye or exposure symptoms.    REDNESS RELIEF:  Lumify---is a good redness reliever that will not cause irritation if used chronically.        FLASHES / FLOATERS / POSTERIOR VITREOUS DETACHMENT    Call the clinic if you have any further changes in symptoms.  Including:  Increased numbers of floaters or flashing lights, dimness or darkness that moves through or stays constant in your vision, or any pain in the eye (s).    You may sometimes see small specks or clouds moving in your field of vision.  They are called FLOATERS.  You can often see them when looking at a plain background, like a blank wall or blue jamie.  Floaters are actually tiny clumps of gel or cells inside the VITREOUS, the clear jelly-like fluid that fills the inside of your eye.    While these objects look like they are in front of your eye, they are actually floating inside.  What you see are the shadows they cast on the RETINA, the nerve layer at the back of the eye that senses light and allows you to see.      POSTERIOR VITREOUS DETACHMENT    The appearance of new floaters may be alarming.  If you suddenly " develop new floaters, you should contact your eye care professional  right away.    The retina can tear if the shrinking vitreous pulls away from the wall of the eye.  This sometimes causes a small amount of bleeding in the eye that may appear as new floaters.    A torn retina is always a serious problem, since it can lead to a retinal detachment.  You should see your eye care professional as soon as possible if:    even one new floater appears suddenly;  you see sudden flashes of light;  you notice other symptoms, like the loss of side vision, or a curtain closes down in your vision        POSTERIOR VITREOUS DETACHMENT is more common for people who:    are nearsighted;  have had cataract surgery;  have had YAG laser surgery of the eye;  have had inflammation inside the eye;  are over age 60.      While some floaters may remain visible, many of them will fade over time and become less noticeable.  Even if you've had some floaters for years, you should have your eyes checked as soon as possible if you notice new ones.    FLASHING LIGHTS    When the vitreous gel rubs or pulls on the retina, you may see what look like flashing lights or lightning streaks.  These flashes can appear off and on for several weeks or months.      Some people experience flashes of light that appear as jagged lines or heat waves in both eyes, lasting 10-20 minutes.  These flashes are caused by a spasm of blood vessels in the brain, which is called a migraine.    If a headache follows these flashes, it's called a migraine headache.  If   no headache occurs, these flashes are called Ophthalmic or Ocular Migraine.           DAILY WEAR CONTACT LENSES  Continue with Daily Wear of contact lenses, up to all waking hours.  Continue with approved contact lens disinfection / rewetting drops as discussed.  Dispose of lenses daily.   Stop wearing your lenses and call our office if redness, blurred vision, or pain persists more than 12 hours.  We  recommend an annual eye exam for contact lens patients.

## 2022-12-20 NOTE — PROGRESS NOTES
HPI    DLS  09/27/2021  DR BALLESTEROS    Pt states she is here to discuss bifocal contacts.  At present for work   she is using only OD and nothing OS.  She does not like that.  Doesn't do   this every day.   Interested in the contacts  got last visit.   No f/f, eye pain, or drops used.    Last edited by Jackelyn Vargas on 12/20/2022  8:34 AM.        ROS    Positive for: Eyes  Negative for: Constitutional, Gastrointestinal, Neurological, Skin,   Genitourinary, Musculoskeletal, HENT, Endocrine, Cardiovascular,   Respiratory, Psychiatric, Allergic/Imm, Heme/Lymph  Last edited by BARBARA Ballesteros, OD on 12/20/2022  8:43 AM.        Assessment /Plan     For exam results, see Encounter Report.    Examination of eyes and vision    Myopia with astigmatism and presbyopia, bilateral    Contact lens/glasses fitting      Ocular health exam OU   Pt defers DFE today   2. Updated specs rx, gave copy for reference  3. Dispense trials of MF air optix to try   Gave copy of old cl's in daily [employer] sphere ---will order trials of MF lenses   Message with report     DAILY WEAR CONTACT LENSES  Continue with Daily Wear of contact lenses, up to all waking hours.  Continue with approved contact lens disinfection / rewetting drops as discussed.  Dispose of lenses daily.   Stop wearing your lenses and call our office if redness, blurred vision, or pain persists more than 12 hours.  We recommend an annual eye exam for contact lens patients.    Discussed and educated patient on current findings /plan.  RTC 1 year, prn if any changes / issues

## 2022-12-21 ENCOUNTER — PATIENT MESSAGE (OUTPATIENT)
Dept: OPTOMETRY | Facility: CLINIC | Age: 52
End: 2022-12-21
Payer: COMMERCIAL

## 2023-01-03 ENCOUNTER — TELEPHONE (OUTPATIENT)
Dept: SPORTS MEDICINE | Facility: CLINIC | Age: 53
End: 2023-01-03
Payer: COMMERCIAL

## 2023-01-03 NOTE — TELEPHONE ENCOUNTER
Called and spoke to patient in regards to 9am PRP with Dr. Aragon tomorrow 1/4/23. Explained that we would need to move her to 9:45am or reschedule to next week.  Patient will arrive at 9:45am.

## 2023-01-04 ENCOUNTER — OFFICE VISIT (OUTPATIENT)
Dept: SPORTS MEDICINE | Facility: CLINIC | Age: 53
End: 2023-01-04
Payer: COMMERCIAL

## 2023-01-04 VITALS
HEART RATE: 76 BPM | DIASTOLIC BLOOD PRESSURE: 86 MMHG | TEMPERATURE: 98 F | HEIGHT: 62 IN | BODY MASS INDEX: 23.96 KG/M2 | SYSTOLIC BLOOD PRESSURE: 140 MMHG

## 2023-01-04 DIAGNOSIS — M77.12 LEFT LATERAL EPICONDYLITIS: Primary | ICD-10-CM

## 2023-01-04 DIAGNOSIS — M67.80 TENDINOSIS: ICD-10-CM

## 2023-01-04 DIAGNOSIS — M25.522 CHRONIC ELBOW PAIN, LEFT: ICD-10-CM

## 2023-01-04 DIAGNOSIS — G89.29 CHRONIC ELBOW PAIN, LEFT: ICD-10-CM

## 2023-01-04 PROCEDURE — 3077F SYST BP >= 140 MM HG: CPT | Mod: CPTII,,, | Performed by: FAMILY MEDICINE

## 2023-01-04 PROCEDURE — 99999 PR PBB SHADOW E&M-EST. PATIENT-LVL III: CPT | Mod: PBBFAC,,, | Performed by: FAMILY MEDICINE

## 2023-01-04 PROCEDURE — 3079F DIAST BP 80-89 MM HG: CPT | Mod: CPTII,,, | Performed by: FAMILY MEDICINE

## 2023-01-04 PROCEDURE — 0232T PR INJECT PLATELET PLASMA W/IMG HARVEST/PREPARATOIN: ICD-10-PCS | Mod: CSM,,, | Performed by: FAMILY MEDICINE

## 2023-01-04 PROCEDURE — 0232T NJX PLATELET PLASMA: CPT | Mod: CSM,,, | Performed by: FAMILY MEDICINE

## 2023-01-04 PROCEDURE — 1159F MED LIST DOCD IN RCRD: CPT | Mod: CPTII,,, | Performed by: FAMILY MEDICINE

## 2023-01-04 PROCEDURE — 99499 NO LOS: ICD-10-PCS | Mod: CSM,S$GLB,, | Performed by: FAMILY MEDICINE

## 2023-01-04 PROCEDURE — 1160F PR REVIEW ALL MEDS BY PRESCRIBER/CLIN PHARMACIST DOCUMENTED: ICD-10-PCS | Mod: CPTII,,, | Performed by: FAMILY MEDICINE

## 2023-01-04 PROCEDURE — 3079F PR MOST RECENT DIASTOLIC BLOOD PRESSURE 80-89 MM HG: ICD-10-PCS | Mod: CPTII,,, | Performed by: FAMILY MEDICINE

## 2023-01-04 PROCEDURE — 99999 PR PBB SHADOW E&M-EST. PATIENT-LVL III: ICD-10-PCS | Mod: PBBFAC,,, | Performed by: FAMILY MEDICINE

## 2023-01-04 PROCEDURE — 3008F BODY MASS INDEX DOCD: CPT | Mod: CPTII,,, | Performed by: FAMILY MEDICINE

## 2023-01-04 PROCEDURE — 3008F PR BODY MASS INDEX (BMI) DOCUMENTED: ICD-10-PCS | Mod: CPTII,,, | Performed by: FAMILY MEDICINE

## 2023-01-04 PROCEDURE — 3077F PR MOST RECENT SYSTOLIC BLOOD PRESSURE >= 140 MM HG: ICD-10-PCS | Mod: CPTII,,, | Performed by: FAMILY MEDICINE

## 2023-01-04 PROCEDURE — 1159F PR MEDICATION LIST DOCUMENTED IN MEDICAL RECORD: ICD-10-PCS | Mod: CPTII,,, | Performed by: FAMILY MEDICINE

## 2023-01-04 PROCEDURE — 99499 UNLISTED E&M SERVICE: CPT | Mod: CSM,S$GLB,, | Performed by: FAMILY MEDICINE

## 2023-01-04 PROCEDURE — 1160F RVW MEDS BY RX/DR IN RCRD: CPT | Mod: CPTII,,, | Performed by: FAMILY MEDICINE

## 2023-01-04 NOTE — PROGRESS NOTES
NUMBER 1/3    PRE-PROCEDURE DIAGNOSIS and   PATIENT INDICATIONS  Maria L Smyth, a 52 y.o. female, presents today with:   #1 Tendinotic changes of common extensor tendon, left /nd   W/ lateral epicondylitis    PROCEDURE PERFORMED  Platelet rich plasma (PRP) injection performed using ultrasound guidance for exact placement of orthobiologic at pathologic site(s)    POTENTIAL RISKS AND BENEFITS  We discussed that this is generally a well-tolerated and safe procedure. Even so, as with any invasive medical procedure, there may be associated rare risks. These risks were discussed in detail. The patient agreed to proceed with this procedure. Please see the electronic medical record for full written and signed patient consent documentation.    DESCRIPTION OF PROCEDURE  A) Procedural time out  A procedural time out was performed, including verification of patient ID, procedure to be performed, site and side/laterality, availability of patient information, review of safety issues, and the patients agreement and consent.     B) Phlebotomy and autograft platelet harvest, and PRP preparation  Sterile technique was used to phlebotomize 120mL of the patients blood from a peripheral vein. This blood was  into density-divided layers using an [employer] centrifuge. The layer of leukocyte poor (hematocrit 2%) PRP, a volume drawn up to 3.0mL (with PPP, if needed) per site/joint, was placed into a sterile syringe in preparation for injection.     C) Platelet rich plasma delivery to pathologic site  Injection site and laterality: LEFT COMMON EXTENSOR TENDON  Using 1) physical examination and 2) musculoskeletal ultrasound, the anatomy was visualized and the diseased tissue was identified and confirmed. The procedure site was marked with a skin marker. The patient was placed in position maximizing 1) physician access to pathologic tissue and 2) patient comfort. The skin about the area was sterilized with ChloraPrep (2%w/v  CHG, 70% IPA). The site of needle insertion was anesthetized using vapocoolant. Under ultrasound guidance, the needle was advanced to the site of tissue pathology, and the injectate was deposited under direct visualization.    POST-PROCEDURE DIAGNOSIS  #1 Tendinotic changes of common extensor tendon, left /nd   W/ lateral epicondylitis    POST-PROCEDURE CARE  Following the procedure, a sterile bandage was placed over the injection site.  A procedure after care instructions (ACI) handout was provided to the patient.    Complications: none     Estimated blood loss from injection procedure: none     Joint aspirate volume, if required: N/A mL     DISPOSITION  The patient tolerated the procedure well and there were no immediate complications. The patient was instructed to call the clinic immediately for any mild to moderate adverse side effects, or to call 911 in the event of an emergency.        Description of ultrasound utilization for needle / probe guidance  Ultrasound guidance was used for needle / probe localization. Images (and videos, if appropriate) were saved and stored for documentation. Dynamic visualization of the needle / probe was continuous throughout the procedure.    0232T

## 2023-01-11 ENCOUNTER — PATIENT OUTREACH (OUTPATIENT)
Dept: ADMINISTRATIVE | Facility: HOSPITAL | Age: 53
End: 2023-01-11
Payer: COMMERCIAL

## 2023-01-11 ENCOUNTER — OFFICE VISIT (OUTPATIENT)
Dept: SPORTS MEDICINE | Facility: CLINIC | Age: 53
End: 2023-01-11
Payer: COMMERCIAL

## 2023-01-11 ENCOUNTER — PATIENT MESSAGE (OUTPATIENT)
Dept: ADMINISTRATIVE | Facility: HOSPITAL | Age: 53
End: 2023-01-11
Payer: COMMERCIAL

## 2023-01-11 VITALS
BODY MASS INDEX: 23.92 KG/M2 | WEIGHT: 130 LBS | DIASTOLIC BLOOD PRESSURE: 90 MMHG | HEIGHT: 62 IN | SYSTOLIC BLOOD PRESSURE: 157 MMHG | HEART RATE: 71 BPM

## 2023-01-11 DIAGNOSIS — G89.29 CHRONIC ELBOW PAIN, LEFT: ICD-10-CM

## 2023-01-11 DIAGNOSIS — M77.12 LEFT LATERAL EPICONDYLITIS: Primary | ICD-10-CM

## 2023-01-11 DIAGNOSIS — M67.80 TENDINOSIS: ICD-10-CM

## 2023-01-11 DIAGNOSIS — M25.522 CHRONIC ELBOW PAIN, LEFT: ICD-10-CM

## 2023-01-11 PROCEDURE — 3077F PR MOST RECENT SYSTOLIC BLOOD PRESSURE >= 140 MM HG: ICD-10-PCS | Mod: CPTII,,, | Performed by: FAMILY MEDICINE

## 2023-01-11 PROCEDURE — 3008F PR BODY MASS INDEX (BMI) DOCUMENTED: ICD-10-PCS | Mod: CPTII,,, | Performed by: FAMILY MEDICINE

## 2023-01-11 PROCEDURE — 1159F MED LIST DOCD IN RCRD: CPT | Mod: CPTII,,, | Performed by: FAMILY MEDICINE

## 2023-01-11 PROCEDURE — 99499 NO LOS: ICD-10-PCS | Mod: CSM,S$GLB,, | Performed by: FAMILY MEDICINE

## 2023-01-11 PROCEDURE — 1160F PR REVIEW ALL MEDS BY PRESCRIBER/CLIN PHARMACIST DOCUMENTED: ICD-10-PCS | Mod: CPTII,,, | Performed by: FAMILY MEDICINE

## 2023-01-11 PROCEDURE — 99999 PR PBB SHADOW E&M-EST. PATIENT-LVL III: ICD-10-PCS | Mod: PBBFAC,,, | Performed by: FAMILY MEDICINE

## 2023-01-11 PROCEDURE — 3008F BODY MASS INDEX DOCD: CPT | Mod: CPTII,,, | Performed by: FAMILY MEDICINE

## 2023-01-11 PROCEDURE — 3080F PR MOST RECENT DIASTOLIC BLOOD PRESSURE >= 90 MM HG: ICD-10-PCS | Mod: CPTII,,, | Performed by: FAMILY MEDICINE

## 2023-01-11 PROCEDURE — 99999 PR PBB SHADOW E&M-EST. PATIENT-LVL III: CPT | Mod: PBBFAC,,, | Performed by: FAMILY MEDICINE

## 2023-01-11 PROCEDURE — 0232T PR INJECT PLATELET PLASMA W/IMG HARVEST/PREPARATOIN: ICD-10-PCS | Mod: CSM,,, | Performed by: FAMILY MEDICINE

## 2023-01-11 PROCEDURE — 3080F DIAST BP >= 90 MM HG: CPT | Mod: CPTII,,, | Performed by: FAMILY MEDICINE

## 2023-01-11 PROCEDURE — 1160F RVW MEDS BY RX/DR IN RCRD: CPT | Mod: CPTII,,, | Performed by: FAMILY MEDICINE

## 2023-01-11 PROCEDURE — 0232T NJX PLATELET PLASMA: CPT | Mod: CSM,,, | Performed by: FAMILY MEDICINE

## 2023-01-11 PROCEDURE — 1159F PR MEDICATION LIST DOCUMENTED IN MEDICAL RECORD: ICD-10-PCS | Mod: CPTII,,, | Performed by: FAMILY MEDICINE

## 2023-01-11 PROCEDURE — 99499 UNLISTED E&M SERVICE: CPT | Mod: CSM,S$GLB,, | Performed by: FAMILY MEDICINE

## 2023-01-11 PROCEDURE — 3077F SYST BP >= 140 MM HG: CPT | Mod: CPTII,,, | Performed by: FAMILY MEDICINE

## 2023-01-11 NOTE — PROGRESS NOTES
NUMBER 2/3    PRE-PROCEDURE DIAGNOSIS and   PATIENT INDICATIONS  Maria L Smyth, a 52 y.o. female, presents today with:   #1 Tendinotic changes of common extensor tendon, left /nd   W/ lateral epicondylitis    PROCEDURE PERFORMED  Platelet rich plasma (PRP) injection performed using ultrasound guidance for exact placement of orthobiologic at pathologic site(s)    POTENTIAL RISKS AND BENEFITS  We discussed that this is generally a well-tolerated and safe procedure. Even so, as with any invasive medical procedure, there may be associated rare risks. These risks were discussed in detail. The patient agreed to proceed with this procedure. Please see the electronic medical record for full written and signed patient consent documentation.    DESCRIPTION OF PROCEDURE  A) Procedural time out  A procedural time out was performed, including verification of patient ID, procedure to be performed, site and side/laterality, availability of patient information, review of safety issues, and the patients agreement and consent.     B) Phlebotomy and autograft platelet harvest, and PRP preparation  Sterile technique was used to phlebotomize 120mL of the patients blood from a peripheral vein. This blood was  into density-divided layers using an [employer] Guillermo centrifuge. The layer of leukocyte poor (hematocrit 2%) PRP, a volume drawn up to 3.0mL (with PPP, if needed) per site/joint, was placed into a sterile syringe in preparation for injection.     C) Platelet rich plasma delivery to pathologic site  Injection site and laterality: LEFT COMMON EXTENSOR TENDON  Using 1) physical examination and 2) musculoskeletal ultrasound, the anatomy was visualized and the diseased tissue was identified and confirmed. The procedure site was marked with a skin marker. The patient was placed in position maximizing 1) physician access to pathologic tissue and 2) patient comfort. The skin about the area was sterilized with ChloraPrep (2%w/v  CHG, 70% IPA). The site of needle insertion was anesthetized using vapocoolant. Under ultrasound guidance, the needle was advanced to the site of tissue pathology, and the injectate was deposited under direct visualization.    POST-PROCEDURE DIAGNOSIS  #1 Tendinotic changes of common extensor tendon, left /nd   W/ lateral epicondylitis    POST-PROCEDURE CARE  Following the procedure, a sterile bandage was placed over the injection site.  A procedure after care instructions (ACI) handout was provided to the patient.    Complications: none     Estimated blood loss from injection procedure: none     Joint aspirate volume, if required: N/A mL     DISPOSITION  The patient tolerated the procedure well and there were no immediate complications. The patient was instructed to call the clinic immediately for any mild to moderate adverse side effects, or to call 911 in the event of an emergency.        Description of ultrasound utilization for needle / probe guidance  Ultrasound guidance was used for needle / probe localization. Images (and videos, if appropriate) were saved and stored for documentation. Dynamic visualization of the needle / probe was continuous throughout the procedure.    0232T

## 2023-01-11 NOTE — LETTER
January 19, 2023    Maria L Smyth  [address] [address]             [facility]  [address]  [facility] [address]  Phone: [phone] Dear Mrs. Smyth:    We have tried to reach you by mychart unsuccessfully.    [facility] is committed to your overall health.  To help you get the most out of each of your visits, we will review your information to make sure you are up to date on all of your recommended tests and or procedures.       Your [facility] team has found that you may be due for:     Health Maintenance Due   Topic    COVID-19 Vaccine (1)    Colorectal Cancer Screening     Shingles Vaccine (1 of 2)    Influenza Vaccine (1)    Mammogram       If you have had any of the above done at another facility, please bring the records or information with you so that your record at [facility] will be complete and up to date.     If you have not had any of these tests or procedures done recently and would like to complete this testing before your appointment with Ana Paula Chandler MD please call [phone] or send a message through your [facility] portal to your provider's office.      If you are currently taking medication, please bring it with you to your appointment for review.     Thanks,      Ana Paula Chandler MD and your [facility] Team

## 2023-01-18 ENCOUNTER — OFFICE VISIT (OUTPATIENT)
Dept: SPORTS MEDICINE | Facility: CLINIC | Age: 53
End: 2023-01-18

## 2023-01-18 VITALS — TEMPERATURE: 98 F

## 2023-01-18 DIAGNOSIS — G89.29 CHRONIC ELBOW PAIN, RIGHT: Primary | ICD-10-CM

## 2023-01-18 DIAGNOSIS — M67.80 TENDINOSIS: ICD-10-CM

## 2023-01-18 DIAGNOSIS — M25.521 CHRONIC ELBOW PAIN, RIGHT: Primary | ICD-10-CM

## 2023-01-18 DIAGNOSIS — G89.29 CHRONIC ELBOW PAIN, LEFT: ICD-10-CM

## 2023-01-18 DIAGNOSIS — M77.11 RIGHT LATERAL EPICONDYLITIS: ICD-10-CM

## 2023-01-18 DIAGNOSIS — M25.522 CHRONIC ELBOW PAIN, LEFT: ICD-10-CM

## 2023-01-18 PROCEDURE — 99999 PR PBB SHADOW E&M-EST. PATIENT-LVL III: ICD-10-PCS | Mod: PBBFAC,,, | Performed by: FAMILY MEDICINE

## 2023-01-18 PROCEDURE — 0232T NJX PLATELET PLASMA: CPT | Mod: CSM,,, | Performed by: FAMILY MEDICINE

## 2023-01-18 PROCEDURE — 99499 NO LOS: ICD-10-PCS | Mod: CSM,S$GLB,, | Performed by: FAMILY MEDICINE

## 2023-01-18 PROCEDURE — 99499 UNLISTED E&M SERVICE: CPT | Mod: CSM,S$GLB,, | Performed by: FAMILY MEDICINE

## 2023-01-18 PROCEDURE — 0232T PR INJECT PLATELET PLASMA W/IMG HARVEST/PREPARATOIN: ICD-10-PCS | Mod: CSM,,, | Performed by: FAMILY MEDICINE

## 2023-01-18 PROCEDURE — 99999 PR PBB SHADOW E&M-EST. PATIENT-LVL III: CPT | Mod: PBBFAC,,, | Performed by: FAMILY MEDICINE

## 2023-01-18 NOTE — PROGRESS NOTES
NUMBER 1/3    PRE-PROCEDURE DIAGNOSIS and   PATIENT INDICATIONS  Maria L Smyth, a 52 y.o. female, presents today with:   #1 Tendinotic changes of common extensor tendon of elbow, right /d   W/ lateral epicondylitis    PROCEDURE PERFORMED  Platelet rich plasma (PRP) injection performed using ultrasound guidance for exact placement of orthobiologic at pathologic site(s)    POTENTIAL RISKS AND BENEFITS  We discussed that this is generally a well-tolerated and safe procedure. Even so, as with any invasive medical procedure, there may be associated rare risks. These risks were discussed in detail. The patient agreed to proceed with this procedure. Please see the electronic medical record for full written and signed patient consent documentation.    DESCRIPTION OF PROCEDURE  A) Procedural time out  A procedural time out was performed, including verification of patient ID, procedure to be performed, site and side/laterality, availability of patient information, review of safety issues, and the patients agreement and consent.     B) Phlebotomy and autograft platelet harvest, and PRP preparation  Sterile technique was used to phlebotomize 120mL of the patients blood from a peripheral vein. This blood was  into density-divided layers using an [employer] centrifuge. The layer of leukocyte poor (hematocrit 2%) PRP, a volume drawn up to 3.0mL (with PPP, if needed) per site/joint, was placed into a sterile syringe in preparation for injection.     C) Platelet rich plasma delivery to pathologic site  Injection site and laterality: RIGHT COMMON EXTENSOR TENDON OF ELBOW  Using 1) physical examination and 2) musculoskeletal ultrasound, the anatomy was visualized and the diseased tissue was identified and confirmed. The procedure site was marked with a skin marker. The patient was placed in position maximizing 1) physician access to pathologic tissue and 2) patient comfort. The skin about the area was sterilized with  ChloraPrep (2%w/v CHG, 70% IPA). The site of needle insertion was anesthetized using vapocoolant. Under ultrasound guidance, the needle was advanced to the site of tissue pathology, and the injectate was deposited under direct visualization.    POST-PROCEDURE DIAGNOSIS  #1 Tendinotic changes of common extensor tendon of elbow, right /d   W/ lateral epicondylitis    POST-PROCEDURE CARE  Following the procedure, a sterile bandage was placed over the injection site.  A procedure after care instructions (ACI) handout was provided to the patient.    Complications: none     Estimated blood loss from injection procedure: none     Joint aspirate volume, if required: N/A mL     DISPOSITION  The patient tolerated the procedure well and there were no immediate complications. The patient was instructed to call the clinic immediately for any mild to moderate adverse side effects, or to call 911 in the event of an emergency.        Description of ultrasound utilization for needle / probe guidance  Ultrasound guidance was used for needle / probe localization. Images (and videos, if appropriate) were saved and stored for documentation. Dynamic visualization of the needle / probe was continuous throughout the procedure.    0232T

## 2023-01-25 ENCOUNTER — OFFICE VISIT (OUTPATIENT)
Dept: SPORTS MEDICINE | Facility: CLINIC | Age: 53
End: 2023-01-25

## 2023-01-25 VITALS — WEIGHT: 128 LBS | TEMPERATURE: 98 F | HEIGHT: 62 IN | BODY MASS INDEX: 23.55 KG/M2

## 2023-01-25 DIAGNOSIS — M67.80 TENDINOSIS: ICD-10-CM

## 2023-01-25 DIAGNOSIS — G89.29 CHRONIC ELBOW PAIN, RIGHT: ICD-10-CM

## 2023-01-25 DIAGNOSIS — M25.521 CHRONIC ELBOW PAIN, RIGHT: ICD-10-CM

## 2023-01-25 DIAGNOSIS — M77.11 RIGHT LATERAL EPICONDYLITIS: Primary | ICD-10-CM

## 2023-01-25 PROCEDURE — 99499 NO LOS: ICD-10-PCS | Mod: CSM,S$GLB,, | Performed by: FAMILY MEDICINE

## 2023-01-25 PROCEDURE — 0232T NJX PLATELET PLASMA: CPT | Mod: CSM,,, | Performed by: FAMILY MEDICINE

## 2023-01-25 PROCEDURE — 99499 UNLISTED E&M SERVICE: CPT | Mod: CSM,S$GLB,, | Performed by: FAMILY MEDICINE

## 2023-01-25 PROCEDURE — 0232T PR INJECT PLATELET PLASMA W/IMG HARVEST/PREPARATOIN: ICD-10-PCS | Mod: CSM,,, | Performed by: FAMILY MEDICINE

## 2023-01-25 PROCEDURE — 99999 PR PBB SHADOW E&M-EST. PATIENT-LVL III: ICD-10-PCS | Mod: PBBFAC,,, | Performed by: FAMILY MEDICINE

## 2023-01-25 PROCEDURE — 99999 PR PBB SHADOW E&M-EST. PATIENT-LVL III: CPT | Mod: PBBFAC,,, | Performed by: FAMILY MEDICINE

## 2023-01-25 NOTE — PROGRESS NOTES
NUMBER 2/2    PRE-PROCEDURE DIAGNOSIS and   PATIENT INDICATIONS  Maria L Smyth, a 52 y.o. female, presents today with:   #1 Tendinotic changes of common extensor tendon of elbow, right /d  W/ lateral epicondylitis    PROCEDURE PERFORMED  Platelet rich plasma (PRP) injection performed using ultrasound guidance for exact placement of orthobiologic at pathologic site(s)    POTENTIAL RISKS AND BENEFITS  We discussed that this is generally a well-tolerated and safe procedure. Even so, as with any invasive medical procedure, there may be associated rare risks. These risks were discussed in detail. The patient agreed to proceed with this procedure. Please see the electronic medical record for full written and signed patient consent documentation.    DESCRIPTION OF PROCEDURE  A) Procedural time out  A procedural time out was performed, including verification of patient ID, procedure to be performed, site and side/laterality, availability of patient information, review of safety issues, and the patients agreement and consent.     B) Phlebotomy and autograft platelet harvest, and PRP preparation  Sterile technique was used to phlebotomize 120mL of the patients blood from a peripheral vein. This blood was  into density-divided layers using an [employer] centrifuge. The layer of leukocyte poor (hematocrit 2%) PRP, a volume drawn up to 3.0mL (with PPP, if needed) per site/joint, was placed into a sterile syringe in preparation for injection.     C) Platelet rich plasma delivery to pathologic site  Injection site and laterality: RIGHT COMMON EXTENSOR TENDON OF ELBOW  Using 1) physical examination and 2) musculoskeletal ultrasound, the anatomy was visualized and the diseased tissue was identified and confirmed. The procedure site was marked with a skin marker. The patient was placed in position maximizing 1) physician access to pathologic tissue and 2) patient comfort. The skin about the area was sterilized with  ChloraPrep (2%w/v CHG, 70% IPA). The site of needle insertion was anesthetized using vapocoolant. Under ultrasound guidance, the needle was advanced to the site of tissue pathology, and the injectate was deposited under direct visualization.    POST-PROCEDURE DIAGNOSIS  #1 Tendinotic changes of common extensor tendon of elbow, right /d  W/ lateral epicondylitis    POST-PROCEDURE CARE  Following the procedure, a sterile bandage was placed over the injection site.  A procedure after care instructions (ACI) handout was provided to the patient.    Complications: none     Estimated blood loss from injection procedure: none     Joint aspirate volume, if required: N/A mL     DISPOSITION  The patient tolerated the procedure well and there were no immediate complications. The patient was instructed to call the clinic immediately for any mild to moderate adverse side effects, or to call 911 in the event of an emergency.        Description of ultrasound utilization for needle / probe guidance  Ultrasound guidance was used for needle / probe localization. Images (and videos, if appropriate) were saved and stored for documentation. Dynamic visualization of the needle / probe was continuous throughout the procedure.    0232T

## 2023-01-28 ENCOUNTER — HOSPITAL ENCOUNTER (OUTPATIENT)
Dept: RADIOLOGY | Facility: HOSPITAL | Age: 53
Discharge: HOME | End: 2023-01-28
Attending: OBSTETRICS & GYNECOLOGY
Payer: COMMERCIAL

## 2023-01-28 DIAGNOSIS — Z12.31 ENCOUNTER FOR SCREENING MAMMOGRAM FOR MALIGNANT NEOPLASM OF BREAST: ICD-10-CM

## 2023-01-28 PROCEDURE — 77063 BREAST TOMOSYNTHESIS BI: CPT | Mod: 26,,, | Performed by: RADIOLOGY

## 2023-01-28 PROCEDURE — 77067 MAMMO DIGITAL SCREENING BILAT WITH TOMO: ICD-10-PCS | Mod: 26,,, | Performed by: RADIOLOGY

## 2023-01-28 PROCEDURE — 77067 SCR MAMMO BI INCL CAD: CPT | Mod: TC,PO

## 2023-01-28 PROCEDURE — 77063 MAMMO DIGITAL SCREENING BILAT WITH TOMO: ICD-10-PCS | Mod: 26,,, | Performed by: RADIOLOGY

## 2023-01-28 PROCEDURE — 77067 SCR MAMMO BI INCL CAD: CPT | Mod: 26,,, | Performed by: RADIOLOGY

## 2023-02-24 ENCOUNTER — PATIENT MESSAGE (OUTPATIENT)
Dept: OPTOMETRY | Facility: CLINIC | Age: 53
End: 2023-02-24
Payer: COMMERCIAL

## 2023-04-11 ENCOUNTER — PATIENT MESSAGE (OUTPATIENT)
Dept: ADMINISTRATIVE | Facility: HOSPITAL | Age: 53
End: 2023-04-11
Payer: COMMERCIAL

## 2023-05-08 ENCOUNTER — HOSPITAL ENCOUNTER (OUTPATIENT)
Dept: RADIOLOGY | Facility: HOSPITAL | Age: 53
Discharge: HOME | End: 2023-05-08
Attending: FAMILY MEDICINE
Payer: COMMERCIAL

## 2023-05-08 ENCOUNTER — OFFICE VISIT (OUTPATIENT)
Dept: FAMILY MEDICINE | Facility: CLINIC | Age: 53
End: 2023-05-08
Payer: COMMERCIAL

## 2023-05-08 VITALS
BODY MASS INDEX: 22.6 KG/M2 | SYSTOLIC BLOOD PRESSURE: 120 MMHG | HEIGHT: 62 IN | HEART RATE: 81 BPM | OXYGEN SATURATION: 98 % | DIASTOLIC BLOOD PRESSURE: 88 MMHG | WEIGHT: 122.81 LBS

## 2023-05-08 DIAGNOSIS — R59.0 LYMPHADENOPATHY, INGUINAL: ICD-10-CM

## 2023-05-08 DIAGNOSIS — Z83.49 FAMILY HISTORY OF MTHFR DEFICIENCY: ICD-10-CM

## 2023-05-08 DIAGNOSIS — R73.09 ABNORMAL GLUCOSE: ICD-10-CM

## 2023-05-08 DIAGNOSIS — M25.551 PAIN OF RIGHT HIP: ICD-10-CM

## 2023-05-08 DIAGNOSIS — Z13.6 ENCOUNTER FOR SCREENING FOR CARDIOVASCULAR DISORDERS: ICD-10-CM

## 2023-05-08 DIAGNOSIS — M25.50 MULTIPLE JOINT PAIN: ICD-10-CM

## 2023-05-08 DIAGNOSIS — Z00.01 ANNUAL VISIT FOR GENERAL ADULT MEDICAL EXAMINATION WITH ABNORMAL FINDINGS: Primary | ICD-10-CM

## 2023-05-08 DIAGNOSIS — E55.9 VITAMIN D DEFICIENCY: ICD-10-CM

## 2023-05-08 DIAGNOSIS — M16.11 PRIMARY OSTEOARTHRITIS OF RIGHT HIP: ICD-10-CM

## 2023-05-08 PROBLEM — R03.0 ELEVATED BP WITHOUT DIAGNOSIS OF HYPERTENSION: Status: ACTIVE | Noted: 2023-05-08

## 2023-05-08 PROBLEM — R03.0 ELEVATED BP WITHOUT DIAGNOSIS OF HYPERTENSION: Status: RESOLVED | Noted: 2023-05-08 | Resolved: 2023-05-08

## 2023-05-08 PROCEDURE — 99396 PREV VISIT EST AGE 40-64: CPT | Mod: S$GLB,,, | Performed by: FAMILY MEDICINE

## 2023-05-08 PROCEDURE — 73502 X-RAY EXAM HIP UNI 2-3 VIEWS: CPT | Mod: 26,RT,, | Performed by: RADIOLOGY

## 2023-05-08 PROCEDURE — 3008F BODY MASS INDEX DOCD: CPT | Mod: CPTII,S$GLB,, | Performed by: FAMILY MEDICINE

## 2023-05-08 PROCEDURE — 3079F DIAST BP 80-89 MM HG: CPT | Mod: CPTII,S$GLB,, | Performed by: FAMILY MEDICINE

## 2023-05-08 PROCEDURE — 3074F PR MOST RECENT SYSTOLIC BLOOD PRESSURE < 130 MM HG: ICD-10-PCS | Mod: CPTII,S$GLB,, | Performed by: FAMILY MEDICINE

## 2023-05-08 PROCEDURE — 99999 PR PBB SHADOW E&M-EST. PATIENT-LVL V: CPT | Mod: PBBFAC,,, | Performed by: FAMILY MEDICINE

## 2023-05-08 PROCEDURE — 99396 PR PREVENTIVE VISIT,EST,40-64: ICD-10-PCS | Mod: S$GLB,,, | Performed by: FAMILY MEDICINE

## 2023-05-08 PROCEDURE — 1159F MED LIST DOCD IN RCRD: CPT | Mod: CPTII,S$GLB,, | Performed by: FAMILY MEDICINE

## 2023-05-08 PROCEDURE — 3074F SYST BP LT 130 MM HG: CPT | Mod: CPTII,S$GLB,, | Performed by: FAMILY MEDICINE

## 2023-05-08 PROCEDURE — 99999 PR PBB SHADOW E&M-EST. PATIENT-LVL V: ICD-10-PCS | Mod: PBBFAC,,, | Performed by: FAMILY MEDICINE

## 2023-05-08 PROCEDURE — 3079F PR MOST RECENT DIASTOLIC BLOOD PRESSURE 80-89 MM HG: ICD-10-PCS | Mod: CPTII,S$GLB,, | Performed by: FAMILY MEDICINE

## 2023-05-08 PROCEDURE — 3008F PR BODY MASS INDEX (BMI) DOCUMENTED: ICD-10-PCS | Mod: CPTII,S$GLB,, | Performed by: FAMILY MEDICINE

## 2023-05-08 PROCEDURE — 1159F PR MEDICATION LIST DOCUMENTED IN MEDICAL RECORD: ICD-10-PCS | Mod: CPTII,S$GLB,, | Performed by: FAMILY MEDICINE

## 2023-05-08 PROCEDURE — 73502 XR HIP WITH PELVIS WHEN PERFORMED, 2 OR 3  VIEWS RIGHT: ICD-10-PCS | Mod: 26,RT,, | Performed by: RADIOLOGY

## 2023-05-08 PROCEDURE — 73502 X-RAY EXAM HIP UNI 2-3 VIEWS: CPT | Mod: TC,FY,PO,RT

## 2023-05-08 NOTE — PROGRESS NOTES
Assessment:       1. Annual visit for general adult medical examination with abnormal findings    2. Abnormal glucose    3. Vitamin D deficiency    4. Family history of MTHFR deficiency    5. Encounter for screening for cardiovascular disorders    6. Multiple joint pain    7. Lymphadenopathy, inguinal    8. Pain of right hip        Plan:       Annual visit for general adult medical examination with abnormal findings  -     CBC Auto Differential; Future; Expected date: 05/08/2023  -     Comprehensive Metabolic Panel; Future; Expected date: 05/08/2023  -     Lipid Panel; Future; Expected date: 05/08/2023  -     TSH; Future; Expected date: 05/08/2023  -     Vitamin D; Future; Expected date: 05/08/2023  -     Insulin, Random; Future; Expected date: 05/08/2023  -     Hemoglobin A1C; Future; Expected date: 05/08/2023  -     Ambulatory referral/consult to Genetics; Future; Expected date: 05/15/2023  -     C-Reactive Protein; Future; Expected date: 05/08/2023  -     Sedimentation rate; Future; Expected date: 05/08/2023  -     ISABELLA by IFA, w/Rflx; Future; Expected date: 05/08/2023  -     Rheumatoid Factor; Future; Expected date: 05/08/2023    Abnormal glucose:  New problem workup needed  -     Insulin, Random; Future; Expected date: 05/08/2023  -     Hemoglobin A1C; Future; Expected date: 05/08/2023    Vitamin D deficiency:  Improved  -     Vitamin D; Future; Expected date: 05/08/2023    Family history of MTHFR deficiency:  New problem workup needed  -     Ambulatory referral/consult to Genetics; Future; Expected date: 05/15/2023    Encounter for screening for cardiovascular disorders  -     CT Cardiac Scoring; Future; Expected date: 05/08/2023    Multiple joint pain:  New problem workup needed  -     C-Reactive Protein; Future; Expected date: 05/08/2023  -     Sedimentation rate; Future; Expected date: 05/08/2023  -     ISABELLA by IFA, w/Rflx; Future; Expected date: 05/08/2023  -     Rheumatoid Factor; Future; Expected date:  05/08/2023    Lymphadenopathy, inguinal:  New problem workup needed  -     US Soft Tissue, Groin Right; Future; Expected date: 05/08/2023    Pain of right hip:  New problem workup needed  -     X-Ray Hip 2 or 3 views Right (with Pelvis when performed); Future; Expected date: 05/08/2023         Will call the patient after we have the results of the test, x-rays were ordered, ultrasound and CT scan, continue healthy habits, continue exercising.   Patient agreed with assessment and plan. Patient verbalized understanding.     Subjective:       Patient ID: Maria L Smyth is a 52 y.o. female.    Chief Complaint: Annual Exam    HPI    The patient is coming here today for an annual examination.  The patient stated that her son was recently diagnosed with MTHFR deficiency gene mutation and wants to make sure that everything is okay and she would like a referral to see the genetic specialist because the were found other abnormalities in the genetic blood work for her son.  The patient also is complaining of having multiple joint pain especially the right hip associated with lymph nodes.  The symptoms are getting worse.  The patient is coming here for assessment.  Upon review of the last blood work, the patient has abnormal glucose levels, she has strong family history of heart disease and she would like to have a CT coronary score.  She has vitamin-D deficiency and currently taking vitamin-D.    Past medical history, past social history was reviewed and discussed with the patient.    Review of Systems   Constitutional:  Negative for activity change, appetite change and chills.   HENT:  Negative for congestion and ear discharge.    Eyes:  Negative for discharge and itching.   Respiratory:  Negative for choking and chest tightness.    Cardiovascular:  Negative for chest pain, palpitations and leg swelling.   Gastrointestinal:  Negative for abdominal distention, abdominal pain and constipation.   Endocrine: Negative for cold  intolerance and heat intolerance.   Genitourinary:  Negative for dysuria and flank pain.   Musculoskeletal:  Positive for arthralgias. Negative for back pain.   Skin:  Negative for pallor and rash.   Allergic/Immunologic: Negative for environmental allergies and food allergies.   Neurological:  Negative for dizziness, facial asymmetry and headaches.   Hematological:  Negative for adenopathy. Does not bruise/bleed easily.   Psychiatric/Behavioral:  Negative for agitation, confusion, decreased concentration and sleep disturbance.      Objective:      Physical Exam  Vitals and nursing note reviewed.   Constitutional:       General: She is not in acute distress.     Appearance: Normal appearance. She is well-developed and normal weight. She is not diaphoretic.   HENT:      Head: Normocephalic and atraumatic.      Right Ear: External ear normal.      Left Ear: External ear normal.      Nose: Nose normal.      Mouth/Throat:      Pharynx: No oropharyngeal exudate.   Eyes:      General: No scleral icterus.        Right eye: No discharge.         Left eye: No discharge.      Conjunctiva/sclera: Conjunctivae normal.      Pupils: Pupils are equal, round, and reactive to light.   Cardiovascular:      Rate and Rhythm: Normal rate and regular rhythm.      Heart sounds: Normal heart sounds.   Pulmonary:      Effort: Pulmonary effort is normal. No respiratory distress.      Breath sounds: Normal breath sounds. No wheezing.   Abdominal:      General: Abdomen is flat. Bowel sounds are normal. There is no distension.      Palpations: There is no mass.      Tenderness: There is no abdominal tenderness. There is no guarding.   Musculoskeletal:         General: Tenderness (Right hip) present. No deformity.      Cervical back: Neck supple.      Comments: Has presence of lymphadenopathy on the right inguinal area   Skin:     Coloration: Skin is not pale.      Findings: No erythema.   Neurological:      Mental Status: She is alert.       Cranial Nerves: No cranial nerve deficit.      Coordination: Coordination normal.   Psychiatric:         Behavior: Behavior normal.         Thought Content: Thought content normal.         Judgment: Judgment normal.

## 2023-05-10 ENCOUNTER — CLINICAL SUPPORT (OUTPATIENT)
Dept: REHABILITATION | Facility: HOSPITAL | Age: 53
End: 2023-05-10
Attending: FAMILY MEDICINE
Payer: COMMERCIAL

## 2023-05-10 DIAGNOSIS — G89.29 CHRONIC RIGHT HIP PAIN: ICD-10-CM

## 2023-05-10 DIAGNOSIS — M25.551 CHRONIC RIGHT HIP PAIN: ICD-10-CM

## 2023-05-10 DIAGNOSIS — R29.898 WEAKNESS OF RIGHT HIP: ICD-10-CM

## 2023-05-10 DIAGNOSIS — M16.11 PRIMARY OSTEOARTHRITIS OF RIGHT HIP: ICD-10-CM

## 2023-05-10 DIAGNOSIS — M25.551 PAIN OF RIGHT HIP: ICD-10-CM

## 2023-05-10 PROCEDURE — 97161 PT EVAL LOW COMPLEX 20 MIN: CPT | Mod: PO | Performed by: PHYSICAL THERAPIST

## 2023-05-10 NOTE — PLAN OF CARE
[facility]  Physical Therapy Initial Evaluation    Name: Maria L Smyth  Clinic Number: [id number]    Therapy Diagnosis:   Encounter Diagnoses   Name Primary?    Pain of right hip     Primary osteoarthritis of right hip     Chronic right hip pain     Weakness of right hip      Physician: Ana Paula Chandler MD    Physician Orders: PT Eval and Treat   Medical Diagnosis from Referral:   Pain of right hip   Primary osteoarthritis of right hip   Evaluation Date: 5/10/2023  Authorization Period Expiration: 05/07/2024  Plan of Care Expiration: 07/07/2023  Visit # / Visits authorized: 1    Time In: 0730  Time Out: 0830  Total Billable Time: 60 minutes    Precautions: Standard    Subjective   Date of onset: 01/25/2023  History of current condition - Maria L reports: having right hip pain noted for the past couple months with no falls noted. No trauma noted. Patient reported having right hip pain that may have been from kick boxing for many years as well. No low back pain. No numbness/tingling. No radicular pain. Right hip and groin pain noted intermittently.       Past Medical History:   Diagnosis Date    Abnormal Pap smear of cervix     Allergic conjunctivitis     ALLERGIC RHINITIS     Atopic dermatitis     Food allergy 9/24/2012    Immunotherapy     Migraine headache      Maria L Smyth  has a past surgical history that includes Shoulder arthroscopy (9/2011); Sinus surgery (1991); and Tonsillectomy (childhood).    Maria L has a current medication list which includes the following prescription(s): b-complex with vitamin c, cetirizine, epinephrine, ergocalciferol, fish oil-omega-3 fatty acids, rizatriptan, and tretinoin.    Review of patient's allergies indicates:   Allergen Reactions    Macrobid  [nitrofurantoin monohyd/m-cryst] Hives and Anaphylaxis     Other reaction(s): Throat closing    Corn Swelling    Effexor  [venlafaxine]      Other reaction(s): dysphagia    Eggs [egg derived] Hives     Wheat Swelling    Doxycycline Itching and Rash        Imagin-  Pain of right hip   Primary osteoarthritis of right hip   Prior Therapy: none noted  Social History:  lives with their family, 2-story, room down-stairs  Occupation:    Work demands: walking, lifting, carrying  Leisure activities: walking, gym, family time  Prior Level of Function: independent  Current Level of Function/limitation: modified independent, increase time noted with home management  Recent or major surgery: none noted  Accidents: none noted    Pain:  Current 2/10, worst 4/10, best 0/10   Location: right hip  Description: Aching, Dull, and Variable  Constant symptoms: no  Intermittent symptoms: yes  Worse: Sitting and Flexing  Better: rest and walking      Disturbed sleep: yes  Is it positional? yes  Unexplained weight loss: no    Pts goals: To decrease pain to right hip, strengthen core and return back to gym independently with no painful limitations    Objective   Posture  Seated: right leg cross over noted  Standing: neutral  Posture correction: better    AROM Right Left Pain/Dysfunction   Hip Flexion: 90 degrees 110 degrees Pain with right hip flexion, end-range   Hip ABD:  degrees  degrees    Hip ER, 90°  flex: 29 degrees 35 degrees Pain with ER (R)   Hip IR, 90°  flex: 19 degrees 32 degrees Pain with IR(R)   Knee Extension:  degrees  degrees    Knee Flexion:  degrees  degrees      Pain with right hip IR noted > ER     Right Left Comment   Hip Flexion: 4/5 5/5    Hip ABD: 4-/5 4/5    Hip ADD: 4+/5 5/5    Hip Extension: 4-/5 4/5    Knee Ext: 4+/5 5/5    Knee Flex: 4+/5 5/5      Joint Mobility: (hypo, hyper, normal): right hip hypo inferior glide    Special Test:  Hip:  Anastacio+ right hip flexion   Scouring (hip pathology)+ right    Hip joint distraction+ for relief  HIP Lag Sign(indicated for possible glut-medius involvement) -  Modified circumduction (labral): + right  FADIR (labral/impingement): +  right    Transfers: modified independent, increase time noted with one arm support    Squat: limitations with RLE deviation     Gait: Patient ambulated without AD, 50 ft noted independently.  Gait deviations: decreased floresita, decreased step length to RLE    CMS Impairment/Limitation/Restriction for FOTO Hip Survey  Status Limitation G-Code CMS Severity Modifier  Intake 77% 23% Current Status CJ - At least 20 percent but less than 40 percent  Predicted 82% 18% Goal Status+ CI - At least 1 percent but less than 20 percent         TREATMENT   Treatment Time In: 0750  Treatment Time Out: 0755  Total Treatment time separate from Evaluation: 5 minutes    Maria L received therapeutic exercises to develop strength, endurance, ROM, flexibility, posture, and core stabilization for 5 minutes including:  Supine: TA, bridge  S/L clam shells  Lateral step downs    Home Exercises and Patient Education Provided    Education provided:   - yes    Written Home Exercises Provided: yes.  Exercises were reviewed and Maria L was able to demonstrate them prior to the end of the session.  Maria L demonstrated good  understanding of the education provided.     See EMR under Patient Instructions for exercises provided 5/10/2023.    Assessment   Maria L is a 52 y.o. female referred to outpatient Physical Therapy with a medical diagnosis of Pain of right hip   Primary osteoarthritis of right hip . Pt presents with right hip pain, hip weakness, gait abnormality, decreased activity tolerance in standing.    Problem List: pain, decreased ROM, decreased flexibility, decreased strength, decreased balance and stability, decreased motor control, antalgic gait, inability to participate fully in vocation pursuits, and decreased ability to fully participate in recreational/sports related activities.    Pt prognosis is Good.   Pt will benefit from skilled outpatient Physical Therapy to address the deficits stated above and in the chart below, provide  pt/family education, and to maximize pt's level of independence.     Plan of care discussed with patient: Yes  Pt's spiritual, cultural and educational needs considered and patient is agreeable to the plan of care and goals as stated below:     Anticipated Barriers for therapy: none    Medical Necessity is demonstrated by the following  History  Co-morbidities and personal factors that may impact the plan of care Co-morbidities:   difficulty sleeping    Personal Factors:   no deficits     moderate   Examination  Body Structures and Functions, activity limitations and participation restrictions that may impact the plan of care Body Regions:   back  lower extremities    Body Systems:    ROM  strength  balance  gait  transfers  transitions  motor control    Participation Restrictions:   Home managment    Activity limitations:   Learning and applying knowledge  no deficits    General Tasks and Commands  no deficits    Communication  no deficits    Mobility  lifting and carrying objects  walking    Self care  no deficits    Domestic Life  doing house work (cleaning house, washing dishes, laundry)    Interactions/Relationships  no deficits    Life Areas  no deficits    Community and Social Life  no deficits         high   Clinical Presentation stable and uncomplicated low   Decision Making/ Complexity Score: low     Short Term GOALS:  In 4 weeks, pt. will:  - improve right hip flexion, internal rotation > 5 degrees for mobility purposes.  - improve hip/core MMT 1/2 grade for sit to stand purposes.  - report > 25% reduction in right hip painful episodes with home management.  - decrease outcome measure limitation to <23%    Long Term GOALS:  In 8 weeks pt. will:  - be independent and compliant with HEP and SX management   - decrease outcome measure limitation to <20%  - demonstrate right hip/core > 4/5 for ambulatory purposes.  - demonstrate squatting x 5 with symmetry and no compensatory pattern.  - report > 50% reduction  in right hip painful episodes with home management.    Plan   Plan of care Certification: 5/10/2023 to 07/07/2023.  Outpatient Physical Therapy 2 times weekly for 8 weeks to include the following interventions: Aquatic Therapy, Electrical Stimulation IFC, Gait Training, Manual Therapy, Moist Heat/ Ice, Neuromuscular Re-ed, Patient Education, Therapeutic Activities, Therapeutic Exercise, and dry needling .      Maria L may at times be seen by a PTA as part of the Rehab Team.    Philip Dexter, PT

## 2023-05-12 ENCOUNTER — HOSPITAL ENCOUNTER (OUTPATIENT)
Dept: RADIOLOGY | Facility: HOSPITAL | Age: 53
Discharge: HOME | End: 2023-05-12
Attending: FAMILY MEDICINE
Payer: COMMERCIAL

## 2023-05-12 DIAGNOSIS — R59.0 LYMPHADENOPATHY, INGUINAL: ICD-10-CM

## 2023-05-12 PROCEDURE — 76882 US SOFT TISSUE, GROIN RIGHT: ICD-10-PCS | Mod: 26,RT,, | Performed by: RADIOLOGY

## 2023-05-12 PROCEDURE — 76882 US LMTD JT/FCL EVL NVASC XTR: CPT | Mod: TC,PO,RT

## 2023-05-12 PROCEDURE — 76882 US LMTD JT/FCL EVL NVASC XTR: CPT | Mod: 26,RT,, | Performed by: RADIOLOGY

## 2023-05-17 ENCOUNTER — CLINICAL SUPPORT (OUTPATIENT)
Dept: REHABILITATION | Facility: HOSPITAL | Age: 53
End: 2023-05-17
Attending: FAMILY MEDICINE
Payer: COMMERCIAL

## 2023-05-17 DIAGNOSIS — G89.29 CHRONIC RIGHT HIP PAIN: Primary | ICD-10-CM

## 2023-05-17 DIAGNOSIS — R29.898 WEAKNESS OF RIGHT HIP: ICD-10-CM

## 2023-05-17 DIAGNOSIS — M25.551 CHRONIC RIGHT HIP PAIN: Primary | ICD-10-CM

## 2023-05-17 PROCEDURE — 97140 MANUAL THERAPY 1/> REGIONS: CPT | Mod: PO | Performed by: PHYSICAL THERAPIST

## 2023-05-17 PROCEDURE — 97110 THERAPEUTIC EXERCISES: CPT | Mod: PO | Performed by: PHYSICAL THERAPIST

## 2023-05-17 NOTE — PROGRESS NOTES
[facility]   Physical Therapy Treatment Note     Name: Maria L Smyth  Clinic Number: [id number]    Therapy Diagnosis:   Encounter Diagnoses   Name Primary?    Chronic right hip pain Yes    Weakness of right hip      Physician: Ana Paula Chandler MD    Visit Date: 5/17/2023  Physician Orders: PT Eval and Treat   Medical Diagnosis from Referral:   Pain of right hip   Primary osteoarthritis of right hip   Evaluation Date: 5/10/2023  Authorization Period Expiration: 05/07/2024  Plan of Care Expiration: 07/07/2023  Visit # / Visits authorized: 1     Time In: 1130  Time Out: 1230  Total Billable Time: 43 minutes     Precautions: Standard    SUBJECTIVE     Pt reports: feeling better from previous session/eval. Right hip pinching less and has been walking too.  She was compliant with home exercise program.  Response to previous treatment: muscle soreness  Functional change: too soon to tell    Pain: 0/10  Location: right hip     OBJECTIVE     Objective Measures updated at progress report unless specified.   Posture  Seated: right leg cross over noted  Standing: neutral  Posture correction: better     AROM Right Left Pain/Dysfunction   Hip Flexion: 90 degrees 110 degrees Pain with right hip flexion, end-range   Hip ABD:  degrees  degrees     Hip ER, 90°  flex: 29 degrees 35 degrees Pain with ER (R)   Hip IR, 90°  flex: 19 degrees 32 degrees Pain with IR(R)   Knee Extension:  degrees  degrees     Knee Flexion:  degrees  degrees        Pain with right hip IR noted > ER       Right Left Comment   Hip Flexion: 4/5 5/5     Hip ABD: 4-/5 4/5     Hip ADD: 4+/5 5/5     Hip Extension: 4-/5 4/5     Knee Ext: 4+/5 5/5     Knee Flex: 4+/5 5/5        Joint Mobility: (hypo, hyper, normal): right hip hypo inferior glide     Special Test:  Hip:  Anastacio+ right hip flexion   Scouring (hip pathology)+ right    Hip joint distraction+ for relief  HIP Lag Sign(indicated for possible glut-medius involvement) -  Modified  "circumduction (labral): + right  FADIR (labral/impingement): + right     Transfers: modified independent, increase time noted with one arm support     Squat: limitations with RLE deviation      Gait: Patient ambulated without AD, 50 ft noted independently.  Gait deviations: decreased floresita, decreased step length to RLE  Treatment     Maria L received the treatments listed below:      therapeutic exercises to develop strength, endurance, ROM, flexibility, posture, and core stabilization for 35 minutes including:  Upright bike x 8 minutes, low intensity  Standing GSS on slant board x 1'  Lateral step downs: 6" step 2/10 each  Half kneeling right hip flex stretch  Supine: TA x 20  Supine: TA, bridge 2/10 with blue band  S/L clam shells with blue band 2/10  Standing hip walk with blue band x 2'  Shuttle: donkey kicks 2/10 1b each    manual therapy techniques: Joint mobilizations and Manual traction were applied to the: right hip for 8 minutes, including:  Right hip inferior glide  Right hip lateral distraction    neuromuscular re-education activities to improve: Balance, Sense, and Posture for 00 minutes. The following activities were included:    Patient Education and Home Exercises     Home Exercises Provided and Patient Education Provided     Education provided:   - Yes    Written Home Exercises Provided: Patient instructed to cont prior HEP. Exercises were reviewed and Maria L was able to demonstrate them prior to the end of the session.  Maria L demonstrated good  understanding of the education provided. See EMR under Patient Instructions for exercises provided during therapy sessions    ASSESSMENT     Patient responded well with TE and demonstrated good tolerance with no adverse effects.    Maria L Is progressing well towards her goals.   Pt prognosis is Good.     Pt will continue to benefit from skilled outpatient physical therapy to address the deficits listed in the problem list box on initial evaluation, " provide pt/family education and to maximize pt's level of independence in the home and community environment.     Pt's spiritual, cultural and educational needs considered and pt agreeable to plan of care and goals.     Anticipated barriers to physical therapy: none    Goals:   Short Term GOALS:  In 4 weeks, pt. will:  - improve right hip flexion, internal rotation > 5 degrees for mobility purposes.  - improve hip/core MMT 1/2 grade for sit to stand purposes.  - report > 25% reduction in right hip painful episodes with home management.  - decrease outcome measure limitation to <23%    PLAN     Continue with SHITAL Dexter, PT

## 2023-05-20 ENCOUNTER — HOSPITAL ENCOUNTER (OUTPATIENT)
Dept: RADIOLOGY | Facility: HOSPITAL | Age: 53
Discharge: HOME | End: 2023-05-20
Attending: FAMILY MEDICINE
Payer: COMMERCIAL

## 2023-05-20 DIAGNOSIS — Z13.6 ENCOUNTER FOR SCREENING FOR CARDIOVASCULAR DISORDERS: ICD-10-CM

## 2023-05-20 PROCEDURE — 75571 CT CALCIUM SCORING CARDIAC: ICD-10-PCS | Mod: 26,,, | Performed by: RADIOLOGY

## 2023-05-20 PROCEDURE — 75571 CT HRT W/O DYE W/CA TEST: CPT | Mod: TC,PO

## 2023-05-20 PROCEDURE — 75571 CT HRT W/O DYE W/CA TEST: CPT | Mod: 26,,, | Performed by: RADIOLOGY

## 2023-06-02 ENCOUNTER — CLINICAL SUPPORT (OUTPATIENT)
Dept: REHABILITATION | Facility: HOSPITAL | Age: 53
End: 2023-06-02
Attending: FAMILY MEDICINE
Payer: COMMERCIAL

## 2023-06-02 DIAGNOSIS — M25.551 CHRONIC RIGHT HIP PAIN: Primary | ICD-10-CM

## 2023-06-02 DIAGNOSIS — R29.898 WEAKNESS OF RIGHT HIP: ICD-10-CM

## 2023-06-02 DIAGNOSIS — G89.29 CHRONIC RIGHT HIP PAIN: Primary | ICD-10-CM

## 2023-06-02 PROCEDURE — 97140 MANUAL THERAPY 1/> REGIONS: CPT | Mod: PO | Performed by: PHYSICAL THERAPIST

## 2023-06-02 PROCEDURE — 97110 THERAPEUTIC EXERCISES: CPT | Mod: PO | Performed by: PHYSICAL THERAPIST

## 2023-06-02 NOTE — PROGRESS NOTES
[facility]   Physical Therapy Treatment Note     Name: Maria L Smyth  Clinic Number: [id number]    Therapy Diagnosis:   Encounter Diagnoses   Name Primary?    Chronic right hip pain Yes    Weakness of right hip      Physician: Ana Paula Chandler MD    Visit Date: 6/2/2023  Physician Orders: PT Eval and Treat   Medical Diagnosis from Referral:   Pain of right hip   Primary osteoarthritis of right hip   Evaluation Date: 5/10/2023  Authorization Period Expiration: 05/07/2024  Plan of Care Expiration: 07/07/2023  Visit # / Visits authorized: 2     Time In: 0900  Time Out: 1000  Total Billable Time:  43 minutes     Precautions: Standard    SUBJECTIVE     Pt reports: feeling a little better and the upright bike does feel better. Right hip pain is intermittent and currently having no pain noted.  She was compliant with home exercise program.  Response to previous treatment: muscle soreness  Functional change: improvement in seated posture.    Pain: 0/10  Location: right hip     OBJECTIVE     Objective Measures updated at progress report unless specified.   Posture  Seated: right leg cross over noted  Standing: neutral  Posture correction: better     AROM Right Left Pain/Dysfunction   Hip Flexion: 90 degrees 110 degrees Pain with right hip flexion, end-range   Hip ABD:  degrees  degrees     Hip ER, 90°  flex: 29 degrees 35 degrees Pain with ER (R)   Hip IR, 90°  flex: 19 degrees 32 degrees Pain with IR(R)   Knee Extension:  degrees  degrees     Knee Flexion:  degrees  degrees        Pain with right hip IR noted > ER       Right Left Comment   Hip Flexion: 4/5 5/5     Hip ABD: 4-/5 4/5     Hip ADD: 4+/5 5/5     Hip Extension: 4-/5 4/5     Knee Ext: 4+/5 5/5     Knee Flex: 4+/5 5/5        Joint Mobility: (hypo, hyper, normal): right hip hypo inferior glide     Special Test:  Hip:  Anastacio+ right hip flexion   Scouring (hip pathology)+ right    Hip joint distraction+ for relief  HIP Lag Sign(indicated  "for possible glut-medius involvement) -  Modified circumduction (labral): + right  FADIR (labral/impingement): + right     Transfers: modified independent, increase time noted with one arm support     Squat: limitations with RLE deviation      Gait: Patient ambulated without AD, 50 ft noted independently.  Gait deviations: decreased floresita, decreased step length to RLE    Treatment     Maria L received the treatments listed below:      therapeutic exercises to develop strength, endurance, ROM, flexibility, posture, and core stabilization for 35 minutes including:    Upright bike x 8 minutes, low intensity  Standing GSS on slant board x 1'  Lateral step downs: 6" step 2/10 each  Half kneeling right hip flex stretch  Supine: TA x 20  Supine: TA, bridge 2/10 with blue band  S/L clam shells with blue band 2/10  Standing hip walk with blue band x 2'  Shuttle: donkey kicks 2/10 1b each    manual therapy techniques: Joint mobilizations and Manual traction were applied to the: right hip for 8 minutes, including:  Right hip inferior glide  Right hip lateral distraction    neuromuscular re-education activities to improve: Balance, Sense, and Posture for 00 minutes. The following activities were included:    Patient Education and Home Exercises     Home Exercises Provided and Patient Education Provided     Education provided:   - Yes    Written Home Exercises Provided: Patient instructed to cont prior HEP. Exercises were reviewed and Maria L was able to demonstrate them prior to the end of the session.  Maria L demonstrated good  understanding of the education provided. See EMR under Patient Instructions for exercises provided during therapy sessions    ASSESSMENT   Patient demonstrated good tolerance with TE progression with no adverse effects.    Maria L Is progressing well towards her goals.   Pt prognosis is Good.     Pt will continue to benefit from skilled outpatient physical therapy to address the deficits listed in " the problem list box on initial evaluation, provide pt/family education and to maximize pt's level of independence in the home and community environment.     Pt's spiritual, cultural and educational needs considered and pt agreeable to plan of care and goals.     Anticipated barriers to physical therapy: none    Goals:   Short Term GOALS:  In 4 weeks, pt. will:  - improve right hip flexion, internal rotation > 5 degrees for mobility purposes.  - improve hip/core MMT 1/2 grade for sit to stand purposes.  - report > 25% reduction in right hip painful episodes with home management.  - decrease outcome measure limitation to <23%    PLAN     Continue with POC    Philip Dexter, PT

## 2023-06-15 ENCOUNTER — PATIENT MESSAGE (OUTPATIENT)
Dept: FAMILY MEDICINE | Facility: CLINIC | Age: 53
End: 2023-06-15
Payer: COMMERCIAL

## 2023-06-15 DIAGNOSIS — L98.8 RHYTIDOSIS FACIALIS: ICD-10-CM

## 2023-06-15 RX ORDER — TRETINOIN 0.25 MG/G
CREAM TOPICAL NIGHTLY
Qty: 45 G | Refills: 6 | Status: SHIPPED | OUTPATIENT
Start: 2023-06-15 | End: 2023-06-15 | Stop reason: DRUGHIGH

## 2023-06-15 RX ORDER — GRANULES FOR ORAL 3 G/1
3 POWDER ORAL ONCE AS NEEDED
Qty: 12 PACKET | Refills: 0 | Status: SHIPPED | OUTPATIENT
Start: 2023-06-15 | End: 2023-11-21

## 2023-06-15 RX ORDER — TRETINOIN 0.5 MG/G
CREAM TOPICAL NIGHTLY
Qty: 45 G | Refills: 1 | Status: SHIPPED | OUTPATIENT
Start: 2023-06-15

## 2023-06-16 ENCOUNTER — TELEPHONE (OUTPATIENT)
Dept: PHARMACY | Facility: CLINIC | Age: 53
End: 2023-06-16
Payer: COMMERCIAL

## 2023-06-16 NOTE — TELEPHONE ENCOUNTER
DOCUMENTATION ONLY  Tretinoin 0.05% cream  Approval date: 6/15/2023 to 6/14/2024  Case ID# [id number]

## 2023-07-17 ENCOUNTER — OFFICE VISIT (OUTPATIENT)
Dept: OBSTETRICS AND GYNECOLOGY | Facility: CLINIC | Age: 53
End: 2023-07-17
Payer: COMMERCIAL

## 2023-07-17 VITALS
DIASTOLIC BLOOD PRESSURE: 75 MMHG | WEIGHT: 124.75 LBS | BODY MASS INDEX: 22.82 KG/M2 | SYSTOLIC BLOOD PRESSURE: 114 MMHG

## 2023-07-17 DIAGNOSIS — N95.1 MENOPAUSAL SYMPTOMS: Primary | ICD-10-CM

## 2023-07-17 PROCEDURE — 3044F PR MOST RECENT HEMOGLOBIN A1C LEVEL <7.0%: ICD-10-PCS | Mod: CPTII,S$GLB,, | Performed by: STUDENT IN AN ORGANIZED HEALTH CARE EDUCATION/TRAINING PROGRAM

## 2023-07-17 PROCEDURE — 3074F SYST BP LT 130 MM HG: CPT | Mod: CPTII,S$GLB,, | Performed by: STUDENT IN AN ORGANIZED HEALTH CARE EDUCATION/TRAINING PROGRAM

## 2023-07-17 PROCEDURE — 3008F PR BODY MASS INDEX (BMI) DOCUMENTED: ICD-10-PCS | Mod: CPTII,S$GLB,, | Performed by: STUDENT IN AN ORGANIZED HEALTH CARE EDUCATION/TRAINING PROGRAM

## 2023-07-17 PROCEDURE — 3078F PR MOST RECENT DIASTOLIC BLOOD PRESSURE < 80 MM HG: ICD-10-PCS | Mod: CPTII,S$GLB,, | Performed by: STUDENT IN AN ORGANIZED HEALTH CARE EDUCATION/TRAINING PROGRAM

## 2023-07-17 PROCEDURE — 3044F HG A1C LEVEL LT 7.0%: CPT | Mod: CPTII,S$GLB,, | Performed by: STUDENT IN AN ORGANIZED HEALTH CARE EDUCATION/TRAINING PROGRAM

## 2023-07-17 PROCEDURE — 3078F DIAST BP <80 MM HG: CPT | Mod: CPTII,S$GLB,, | Performed by: STUDENT IN AN ORGANIZED HEALTH CARE EDUCATION/TRAINING PROGRAM

## 2023-07-17 PROCEDURE — 3008F BODY MASS INDEX DOCD: CPT | Mod: CPTII,S$GLB,, | Performed by: STUDENT IN AN ORGANIZED HEALTH CARE EDUCATION/TRAINING PROGRAM

## 2023-07-17 PROCEDURE — 99999 PR PBB SHADOW E&M-EST. PATIENT-LVL III: ICD-10-PCS | Mod: PBBFAC,,, | Performed by: STUDENT IN AN ORGANIZED HEALTH CARE EDUCATION/TRAINING PROGRAM

## 2023-07-17 PROCEDURE — 99214 PR OFFICE/OUTPT VISIT, EST, LEVL IV, 30-39 MIN: ICD-10-PCS | Mod: S$GLB,,, | Performed by: STUDENT IN AN ORGANIZED HEALTH CARE EDUCATION/TRAINING PROGRAM

## 2023-07-17 PROCEDURE — 99999 PR PBB SHADOW E&M-EST. PATIENT-LVL III: CPT | Mod: PBBFAC,,, | Performed by: STUDENT IN AN ORGANIZED HEALTH CARE EDUCATION/TRAINING PROGRAM

## 2023-07-17 PROCEDURE — 99214 OFFICE O/P EST MOD 30 MIN: CPT | Mod: S$GLB,,, | Performed by: STUDENT IN AN ORGANIZED HEALTH CARE EDUCATION/TRAINING PROGRAM

## 2023-07-17 PROCEDURE — 3074F PR MOST RECENT SYSTOLIC BLOOD PRESSURE < 130 MM HG: ICD-10-PCS | Mod: CPTII,S$GLB,, | Performed by: STUDENT IN AN ORGANIZED HEALTH CARE EDUCATION/TRAINING PROGRAM

## 2023-07-17 RX ORDER — PROGESTERONE 200 MG/1
200 CAPSULE ORAL NIGHTLY
Qty: 30 CAPSULE | Refills: 3 | Status: SHIPPED | OUTPATIENT
Start: 2023-07-17 | End: 2024-07-16

## 2023-07-17 RX ORDER — ESTRADIOL 0.07 MG/D
PATCH TRANSDERMAL
Qty: 4 PATCH | Refills: 3 | Status: SHIPPED | OUTPATIENT
Start: 2023-07-17 | End: 2024-07-16

## 2023-07-17 NOTE — PROGRESS NOTES
History & Physical  Gynecology      SUBJECTIVE:     Chief Complaint: menopause symptoms       History of Present Illness:    Here today to talk about menopausal symptoms. Regular cycles every 30 days still. + hot flashes, memory fog, joint tenderness, difficulty sleeping.     No hx of abnormal pap. Mom had estrogen + breast cancer. Negative genetics. No other immediate FH of gyn or colon cancers.       Review of patient's allergies indicates:   Allergen Reactions    Macrobid  [nitrofurantoin monohyd/m-cryst] Hives and Anaphylaxis     Other reaction(s): Throat closing    Corn Swelling    Effexor  [venlafaxine]      Other reaction(s): dysphagia    Eggs [egg derived] Hives    Wheat Swelling    Doxycycline Itching and Rash       Past Medical History:   Diagnosis Date    Abnormal Pap smear of cervix     Allergic conjunctivitis     ALLERGIC RHINITIS     Atopic dermatitis     Food allergy 2012    Immunotherapy     Migraine headache      Past Surgical History:   Procedure Laterality Date    SHOULDER ARTHROSCOPY  2011    right rotator cuff repair    SINUS SURGERY      TONSILLECTOMY  childhood     OB History          3    Para   3    Term   3            AB        Living   3         SAB        IAB        Ectopic        Multiple        Live Births   3               Family History   Problem Relation Age of Onset    Allergic rhinitis Son     Allergic rhinitis Son     Asthma Son     Atopy Son     Eczema Son     Cancer Maternal Grandmother     Cancer Maternal Aunt 40        adenoca lung, breast (Non-smoker)    Diabetes Maternal Aunt     Heart disease Mother 60        s/p cabg    Retinitis pigmentosa Mother     Breast cancer Mother     Heart disease Father 62        s/p cabg    Cancer Father         bladder ca (+tob)    Diabetes Father         pre-diabetes    Cancer Maternal Grandfather         NHL    Immunodeficiency Neg Hx     Urticaria Neg Hx     Angioedema Neg Hx     Allergies Neg Hx     Rhinitis Neg Hx      Ovarian cancer Neg Hx     Colon cancer Neg Hx     Melanoma Neg Hx      Social History     Tobacco Use    Smoking status: Never    Smokeless tobacco: Never   Substance Use Topics    Alcohol use: Yes     Comment: social    Drug use: No       Current Outpatient Medications   Medication Sig    B-complex with vitamin C (Z-BEC OR EQUIV) tablet Take 1 tablet by mouth once daily.    cetirizine (ZYRTEC) 10 MG tablet Take 10 mg by mouth as needed.     EPINEPHrine (EPIPEN) 0.3 mg/0.3 mL AtIn Use as directed    ergocalciferol (ERGOCALCIFEROL) 50,000 unit Cap Take 1 capsule (50,000 Units total) by mouth every 7 days.    fish oil-omega-3 fatty acids 300-1,000 mg capsule Take 2 g by mouth once daily.    rizatriptan (MAXALT) 10 MG tablet Take 1 tablet by mouth daily as needed for migraine / headaches.    tretinoin (RETIN-A) 0.05 % cream Apply topically every evening.    estradioL (CLIMARA) 0.075 mg/24 hr Apply patch weekly    progesterone (PROMETRIUM) 200 MG capsule Take 1 capsule (200 mg total) by mouth nightly.     No current facility-administered medications for this visit.         Review of Systems:  Review of Systems   Constitutional:  Negative for chills, fatigue and fever.   HENT:  Negative for congestion.    Eyes:  Negative for visual disturbance.   Respiratory:  Negative for cough and shortness of breath.    Cardiovascular:  Negative for chest pain and palpitations.   Gastrointestinal:  Negative for abdominal distention, abdominal pain, constipation, diarrhea, nausea and vomiting.   Genitourinary:  Negative for difficulty urinating, dysuria, hematuria, vaginal bleeding and vaginal discharge.   Skin:  Negative for rash.   Neurological:  Negative for dizziness, seizures, light-headedness and headaches.   Hematological:  Does not bruise/bleed easily.   Psychiatric/Behavioral:  Negative for dysphoric mood. The patient is not nervous/anxious.       OBJECTIVE:     Physical Exam:  Physical Exam  Vitals reviewed.    Constitutional:       General: She is not in acute distress.     Appearance: Normal appearance. She is well-developed.   HENT:      Head: Normocephalic and atraumatic.   Cardiovascular:      Rate and Rhythm: Normal rate and regular rhythm.   Pulmonary:      Effort: Pulmonary effort is normal.   Abdominal:      General: There is no distension.      Palpations: Abdomen is soft.   Genitourinary:     Vagina: Normal.   Skin:     General: Skin is warm.   Neurological:      Mental Status: She is alert and oriented to person, place, and time.   Psychiatric:         Behavior: Behavior normal.         Thought Content: Thought content normal.         Judgment: Judgment normal.         ASSESSMENT:       ICD-10-CM ICD-9-CM    1. Menopausal symptoms  N95.1 627.2 estradioL (CLIMARA) 0.075 mg/24 hr      progesterone (PROMETRIUM) 200 MG capsule          No orders of the defined types were placed in this encounter.          Plan:      Menopausal symptoms  - Discussed that supplemental hormones can significantly improve her quality of life by improving vasomotor symptoms. Discussed risks including DVT/PE, stroke, heart disease, and breast cancer. Discussed benefits of improved bone health. Discussed recommendation to use lowest possible dose for shortest amount of time. Patient understands these risks and would like to proceed.  - discussed options  - opts to try estrogen patch and oral prometrium at night    F/u 4-6 weeks    Twila Villarreal M.D.  Obstetrics and Gynecology

## 2023-07-26 ENCOUNTER — PATIENT MESSAGE (OUTPATIENT)
Dept: ADMINISTRATIVE | Facility: HOSPITAL | Age: 53
End: 2023-07-26
Payer: COMMERCIAL

## 2023-07-27 ENCOUNTER — OFFICE VISIT (OUTPATIENT)
Dept: URGENT CARE | Facility: CLINIC | Age: 53
End: 2023-07-27
Payer: COMMERCIAL

## 2023-07-27 VITALS
TEMPERATURE: 98 F | DIASTOLIC BLOOD PRESSURE: 93 MMHG | RESPIRATION RATE: 16 BRPM | HEART RATE: 74 BPM | SYSTOLIC BLOOD PRESSURE: 129 MMHG | OXYGEN SATURATION: 99 %

## 2023-07-27 DIAGNOSIS — J06.9 VIRAL URI: ICD-10-CM

## 2023-07-27 DIAGNOSIS — J02.9 SORE THROAT: Primary | ICD-10-CM

## 2023-07-27 LAB
CTP QC/QA: YES
CTP QC/QA: YES
HETEROPH AB SER QL: NEGATIVE
MOLECULAR STREP A: NEGATIVE

## 2023-07-27 PROCEDURE — 87651 POCT STREP A MOLECULAR: ICD-10-PCS | Mod: QW,S$GLB,, | Performed by: NURSE PRACTITIONER

## 2023-07-27 PROCEDURE — 86308 HETEROPHILE ANTIBODY SCREEN: CPT | Mod: QW,S$GLB,, | Performed by: NURSE PRACTITIONER

## 2023-07-27 PROCEDURE — 99213 PR OFFICE/OUTPT VISIT, EST, LEVL III, 20-29 MIN: ICD-10-PCS | Mod: S$GLB,,, | Performed by: NURSE PRACTITIONER

## 2023-07-27 PROCEDURE — 99213 OFFICE O/P EST LOW 20 MIN: CPT | Mod: S$GLB,,, | Performed by: NURSE PRACTITIONER

## 2023-07-27 PROCEDURE — 87651 STREP A DNA AMP PROBE: CPT | Mod: QW,S$GLB,, | Performed by: NURSE PRACTITIONER

## 2023-07-27 PROCEDURE — 86308 POCT INFECTIOUS MONONUCLEOSIS: ICD-10-PCS | Mod: QW,S$GLB,, | Performed by: NURSE PRACTITIONER

## 2023-07-27 RX ORDER — PREDNISONE 20 MG/1
TABLET ORAL
Qty: 5 TABLET | Refills: 0 | Status: SHIPPED | OUTPATIENT
Start: 2023-07-27 | End: 2023-08-03

## 2023-07-27 NOTE — PATIENT INSTRUCTIONS
-Finish taking zpack.  -Steroids for the next 6 days.  - Rest.  - Drink plenty of fluids.  - Take Tylenol and/or Ibuprofen as directed as needed for fever/pain.  Do not take more than the recommended dose.  - follow up with your PCP within the next 1-2 weeks as needed.  - You must understand that you have received an Urgent Care treatment only and that you may be released before all of your medical problems are known or treated.   - You, the patient, will arrange for follow up care as instructed.   - If your condition worsens or fails to improve we recommend that you receive another evaluation at the ER immediately or contact your PCP to discuss your concerns.   - You can call [phone] or [phone] to help schedule an appointment with the appropriate provider.

## 2023-07-27 NOTE — PROGRESS NOTES
Subjective:      Patient ID: Maria L Smyth is a 52 y.o. female.    Vitals:  temperature is 97.9 °F (36.6 °C). Her blood pressure is 129/93 (abnormal) and her pulse is 74. Her respiration is 16 and oxygen saturation is 99%.     Chief Complaint: Sore Throat    Pt presents with sore throat, fever, sinus sangeetha, cough, sneezing, swollen glands x 3 days.  Patient also started taking Z-Pascual approximally 2 days ago.  Patient reports that her daughter has mono but she has not been in contact with her daughter.  Denies any fevers or chills.  Pt also started with rash to chest and upper back x today--no itching    Sore Throat   This is a new problem. The current episode started in the past 7 days. The problem has been gradually worsening. Neither side of throat is experiencing more pain than the other. The maximum temperature recorded prior to her arrival was 100.4 - 100.9 F. The fever has been present for 1 to 2 days. The pain is at a severity of 2/10. The pain is mild. Associated symptoms include congestion, coughing, headaches, swollen glands and trouble swallowing. She has had exposure to mono. She has tried acetaminophen (ibuprofen) for the symptoms. The treatment provided moderate relief.     HENT:  Positive for congestion, sore throat and trouble swallowing.    Respiratory:  Positive for cough.    Neurological:  Positive for headaches.    Objective:     Physical Exam   Constitutional: She is oriented to person, place, and time. She appears well-developed. She is cooperative.  Non-toxic appearance. She does not appear ill. No distress.   HENT:   Head: Normocephalic and atraumatic.   Ears:   Right Ear: Hearing, tympanic membrane, external ear and ear canal normal.   Left Ear: Hearing, tympanic membrane, external ear and ear canal normal.   Nose: Nose normal. No mucosal edema, rhinorrhea or nasal deformity. No epistaxis. Right sinus exhibits no maxillary sinus tenderness and no frontal sinus tenderness. Left sinus exhibits  "no maxillary sinus tenderness and no frontal sinus tenderness.   Mouth/Throat: Uvula is midline and mucous membranes are normal. No trismus in the jaw. Normal dentition. No uvula swelling. Posterior oropharyngeal erythema present. No oropharyngeal exudate or posterior oropharyngeal edema.   Eyes: Conjunctivae and lids are normal. No scleral icterus.   Neck: Trachea normal and phonation normal. Neck supple. No edema present. No erythema present. No neck rigidity present.   Cardiovascular: Normal rate, regular rhythm, normal heart sounds and normal pulses.   Pulmonary/Chest: Effort normal and breath sounds normal. No respiratory distress. She has no decreased breath sounds. She has no rhonchi.   Abdominal: Normal appearance.   Musculoskeletal: Normal range of motion.         General: No deformity. Normal range of motion.   Neurological: She is alert and oriented to person, place, and time. She exhibits normal muscle tone. Coordination normal.   Skin: Skin is warm, dry, intact, not diaphoretic, not pale and rash (fine sandpaper rash presents with chest and back.  No itching present.).   Psychiatric: Her speech is normal and behavior is normal. Judgment and thought content normal.   Nursing note and vitals reviewed.    Assessment:     1. Sore throat    2. Viral URI        Plan:     Results for orders placed or performed in visit on 07/27/23   POCT Strep A, Molecular   Result Value Ref Range    Molecular Strep A, POC Negative Negative     Acceptable Yes    POCT Infectious mononucleosis antibody   Result Value Ref Range    Monospot Negative Negative     Acceptable Yes      Patient is already taking antibiotics for possible strep.  Concerns of strep based on patients presentation today. Patient was advised to finish taking her Z-Pascual and start taking steroids tomorrow morning.  Patient also reports that she "breaks out into rase" with URI in the past. Patient was also advised to follow up with " her primary care doctor if symptoms fail to improve.  Concerns of possible viral exanthem on presentation today.  Patient was advised and educated on mono as well as strep throat.  Likely kelly of mono is low but patient is still advised to closely monitor signs and symptoms and to follow up. All questions were answered in the clinic today.      Sore throat  -     POCT Strep A, Molecular  -     POCT Infectious mononucleosis antibody    Viral URI  -     predniSONE (DELTASONE) 20 MG tablet; Take 0.5 tablets (10 mg total) by mouth once daily for 3 days, THEN 1 tablet (20 mg total) once daily for 3 days.  Dispense: 5 tablet; Refill: 0        Patient Instructions   -Finish taking zpack.  -Steroids for the next 6 days.  - Rest.  - Drink plenty of fluids.  - Take Tylenol and/or Ibuprofen as directed as needed for fever/pain.  Do not take more than the recommended dose.  - follow up with your PCP within the next 1-2 weeks as needed.  - You must understand that you have received an Urgent Care treatment only and that you may be released before all of your medical problems are known or treated.   - You, the patient, will arrange for follow up care as instructed.   - If your condition worsens or fails to improve we recommend that you receive another evaluation at the ER immediately or contact your PCP to discuss your concerns.   - You can call [phone] or [phone] to help schedule an appointment with the appropriate provider.

## 2023-08-21 ENCOUNTER — OFFICE VISIT (OUTPATIENT)
Dept: OBSTETRICS AND GYNECOLOGY | Facility: CLINIC | Age: 53
End: 2023-08-21
Payer: COMMERCIAL

## 2023-08-21 DIAGNOSIS — N95.1 MENOPAUSAL SYMPTOMS: Primary | ICD-10-CM

## 2023-08-21 PROCEDURE — 3044F PR MOST RECENT HEMOGLOBIN A1C LEVEL <7.0%: ICD-10-PCS | Mod: CPTII,95,, | Performed by: STUDENT IN AN ORGANIZED HEALTH CARE EDUCATION/TRAINING PROGRAM

## 2023-08-21 PROCEDURE — 99213 OFFICE O/P EST LOW 20 MIN: CPT | Mod: 95,,, | Performed by: STUDENT IN AN ORGANIZED HEALTH CARE EDUCATION/TRAINING PROGRAM

## 2023-08-21 PROCEDURE — 99213 PR OFFICE/OUTPT VISIT, EST, LEVL III, 20-29 MIN: ICD-10-PCS | Mod: 95,,, | Performed by: STUDENT IN AN ORGANIZED HEALTH CARE EDUCATION/TRAINING PROGRAM

## 2023-08-21 PROCEDURE — 3044F HG A1C LEVEL LT 7.0%: CPT | Mod: CPTII,95,, | Performed by: STUDENT IN AN ORGANIZED HEALTH CARE EDUCATION/TRAINING PROGRAM

## 2023-08-21 NOTE — PROGRESS NOTES
The patient location is: home  The chief complaint leading to consultation is: f/u of HRT    Visit type: audiovisual    Face to Face time with patient: 7  12 minutes of total time spent on the encounter, which includes face to face time and non-face to face time preparing to see the patient (eg, review of tests), Obtaining and/or reviewing separately obtained history, Documenting clinical information in the electronic or other health record, Independently interpreting results (not separately reported) and communicating results to the patient/family/caregiver, or Care coordination (not separately reported).         Each patient to whom he or she provides medical services by telemedicine is:  (1) informed of the relationship between the physician and patient and the respective role of any other health care provider with respect to management of the patient; and (2) notified that he or she may decline to receive medical services by telemedicine and may withdraw from such care at any time.    Notes:     History & Physical  Gynecology      SUBJECTIVE:     Chief Complaint: No chief complaint on file.       History of Present Illness:    Here today to f/u on HRT. Had issues with patches sticking and reaction to them. Skin flared up, couldn't keep patch to stay on. Stopped patches and oral prometrium at night. Started going back to the gym and exercising regularly and feeling really good.     Review of patient's allergies indicates:   Allergen Reactions    Macrobid  [nitrofurantoin monohyd/m-cryst] Hives and Anaphylaxis     Other reaction(s): Throat closing    Corn Swelling    Effexor  [venlafaxine]      Other reaction(s): dysphagia    Eggs [egg derived] Hives    Wheat Swelling    Doxycycline Itching and Rash       Past Medical History:   Diagnosis Date    Abnormal Pap smear of cervix     Allergic conjunctivitis     ALLERGIC RHINITIS     Atopic dermatitis     Food allergy 9/24/2012    Immunotherapy     Migraine headache       Past Surgical History:   Procedure Laterality Date    SHOULDER ARTHROSCOPY  2011    right rotator cuff repair    SINUS SURGERY      TONSILLECTOMY  childhood     OB History          3    Para   3    Term   3            AB        Living   3         SAB        IAB        Ectopic        Multiple        Live Births   3               Family History   Problem Relation Age of Onset    Allergic rhinitis Son     Allergic rhinitis Son     Asthma Son     Atopy Son     Eczema Son     Cancer Maternal Grandmother     Cancer Maternal Aunt 40        adenoca lung, breast (Non-smoker)    Diabetes Maternal Aunt     Heart disease Mother 60        s/p cabg    Retinitis pigmentosa Mother     Breast cancer Mother     Heart disease Father 62        s/p cabg    Cancer Father         bladder ca (+tob)    Diabetes Father         pre-diabetes    Cancer Maternal Grandfather         NHL    Immunodeficiency Neg Hx     Urticaria Neg Hx     Angioedema Neg Hx     Allergies Neg Hx     Rhinitis Neg Hx     Ovarian cancer Neg Hx     Colon cancer Neg Hx     Melanoma Neg Hx      Social History     Tobacco Use    Smoking status: Never    Smokeless tobacco: Never   Substance Use Topics    Alcohol use: Yes     Comment: social    Drug use: No       Current Outpatient Medications   Medication Sig    B-complex with vitamin C (Z-BEC OR EQUIV) tablet Take 1 tablet by mouth once daily.    cetirizine (ZYRTEC) 10 MG tablet Take 10 mg by mouth as needed.     EPINEPHrine (EPIPEN) 0.3 mg/0.3 mL AtIn Use as directed    ergocalciferol (ERGOCALCIFEROL) 50,000 unit Cap Take 1 capsule (50,000 Units total) by mouth every 7 days.    estradioL (CLIMARA) 0.075 mg/24 hr Apply 1 patch topically once weekly    fish oil-omega-3 fatty acids 300-1,000 mg capsule Take 2 g by mouth once daily.    progesterone (PROMETRIUM) 200 MG capsule Take 1 capsule (200 mg total) by mouth nightly.    rizatriptan (MAXALT) 10 MG tablet Take 1 tablet by mouth daily as needed for  migraine / headaches.    tretinoin (RETIN-A) 0.05 % cream Apply topically every evening.     No current facility-administered medications for this visit.         Review of Systems:  Review of Systems   Constitutional:  Negative for chills, fatigue and fever.   HENT:  Negative for congestion.    Eyes:  Negative for visual disturbance.   Respiratory:  Negative for cough and shortness of breath.    Cardiovascular:  Negative for chest pain and palpitations.   Gastrointestinal:  Negative for abdominal distention, abdominal pain, constipation, diarrhea, nausea and vomiting.   Genitourinary:  Negative for difficulty urinating, dysuria, hematuria, vaginal bleeding and vaginal discharge.   Skin:  Negative for rash.   Neurological:  Negative for dizziness, seizures, light-headedness and headaches.   Hematological:  Does not bruise/bleed easily.   Psychiatric/Behavioral:  Negative for dysphoric mood. The patient is not nervous/anxious.         OBJECTIVE:     Physical Exam:  Physical Exam  Vitals reviewed.   Constitutional:       General: She is not in acute distress.     Appearance: Normal appearance. She is well-developed.   HENT:      Head: Normocephalic and atraumatic.   Cardiovascular:      Rate and Rhythm: Normal rate and regular rhythm.   Pulmonary:      Effort: Pulmonary effort is normal.   Abdominal:      General: There is no distension.      Palpations: Abdomen is soft.   Genitourinary:     Vagina: Normal.   Skin:     General: Skin is warm.   Neurological:      Mental Status: She is alert and oriented to person, place, and time.   Psychiatric:         Behavior: Behavior normal.         Thought Content: Thought content normal.         Judgment: Judgment normal.           ASSESSMENT:       ICD-10-CM ICD-9-CM    1. Menopausal symptoms  N95.1 627.2           No orders of the defined types were placed in this encounter.          Plan:      - OK to stop HRT  - continue lifestyle modifications as working to control  symptoms  - will make not of adhesive allergy and would try transdermal gel vs oral medication if needed in the future.     Twila Villarreal M.D.  Obstetrics and Gynecology

## 2023-09-20 ENCOUNTER — OFFICE VISIT (OUTPATIENT)
Dept: DERMATOLOGY | Facility: CLINIC | Age: 53
End: 2023-09-20
Payer: COMMERCIAL

## 2023-09-20 DIAGNOSIS — L82.0 INFLAMED SEBORRHEIC KERATOSIS: ICD-10-CM

## 2023-09-20 DIAGNOSIS — L57.8 PHOTOAGING OF SKIN: Primary | ICD-10-CM

## 2023-09-20 PROCEDURE — 1159F MED LIST DOCD IN RCRD: CPT | Mod: CPTII,S$GLB,, | Performed by: DERMATOLOGY

## 2023-09-20 PROCEDURE — 99999 PR PBB SHADOW E&M-EST. PATIENT-LVL II: CPT | Mod: PBBFAC,,, | Performed by: DERMATOLOGY

## 2023-09-20 PROCEDURE — 17110 PR DESTRUCTION BENIGN LESIONS UP TO 14: ICD-10-PCS | Mod: S$GLB,,, | Performed by: DERMATOLOGY

## 2023-09-20 PROCEDURE — 1159F PR MEDICATION LIST DOCUMENTED IN MEDICAL RECORD: ICD-10-PCS | Mod: CPTII,S$GLB,, | Performed by: DERMATOLOGY

## 2023-09-20 PROCEDURE — 99214 PR OFFICE/OUTPT VISIT, EST, LEVL IV, 30-39 MIN: ICD-10-PCS | Mod: 25,S$GLB,, | Performed by: DERMATOLOGY

## 2023-09-20 PROCEDURE — 17110 DESTRUCTION B9 LES UP TO 14: CPT | Mod: S$GLB,,, | Performed by: DERMATOLOGY

## 2023-09-20 PROCEDURE — 3044F HG A1C LEVEL LT 7.0%: CPT | Mod: CPTII,S$GLB,, | Performed by: DERMATOLOGY

## 2023-09-20 PROCEDURE — 99214 OFFICE O/P EST MOD 30 MIN: CPT | Mod: 25,S$GLB,, | Performed by: DERMATOLOGY

## 2023-09-20 PROCEDURE — 99999 PR PBB SHADOW E&M-EST. PATIENT-LVL II: ICD-10-PCS | Mod: PBBFAC,,, | Performed by: DERMATOLOGY

## 2023-09-20 PROCEDURE — 3044F PR MOST RECENT HEMOGLOBIN A1C LEVEL <7.0%: ICD-10-PCS | Mod: CPTII,S$GLB,, | Performed by: DERMATOLOGY

## 2023-09-20 RX ORDER — TRETINOIN 0.5 MG/G
LOTION TOPICAL
Qty: 45 G | Refills: 5 | Status: SHIPPED | OUTPATIENT
Start: 2023-09-20

## 2023-09-20 NOTE — PROGRESS NOTES
Subjective:      Patient ID:  Maria L Smyth is a 52 y.o. female who presents for   Chief Complaint   Patient presents with    Lesion     Scalp.  X1 week. Endorses itchiness     HPI     Established patient.   Here for new issue - spot at scalp, present x ~1 week, irritating and itchy.   No personal hx of skin cancer. FHX significant for father with NMSC, recent melanoma.   Declines skin cancer screening today.     Review of Systems    Objective:   Physical Exam   Constitutional: She appears well-developed and well-nourished. No distress.   Neurological: She is alert and oriented to person, place, and time. She is not disoriented.   Psychiatric: She has a normal mood and affect.   Skin:               Diagram Legend     Erythematous scaling macule/papule c/w actinic keratosis       Vascular papule c/w angioma      Pigmented verrucoid papule/plaque c/w seborrheic keratosis      Yellow umbilicated papule c/w sebaceous hyperplasia      Irregularly shaped tan macule c/w lentigo     1-2 mm smooth white papules consistent with Milia      Movable subcutaneous cyst with punctum c/w epidermal inclusion cyst      Subcutaneous movable cyst c/w pilar cyst      Firm pink to brown papule c/w dermatofibroma      Pedunculated fleshy papule(s) c/w skin tag(s)      Evenly pigmented macule c/w junctional nevus     Mildly variegated pigmented, slightly irregular-bordered macule c/w mildly atypical nevus      Flesh colored to evenly pigmented papule c/w intradermal nevus       Pink pearly papule/plaque c/w basal cell carcinoma      Erythematous hyperkeratotic cursted plaque c/w SCC      Surgical scar with no sign of skin cancer recurrence      Open and closed comedones      Inflammatory papules and pustules      Verrucoid papule consistent consistent with wart     Erythematous eczematous patches and plaques     Dystrophic onycholytic nail with subungual debris c/w onychomycosis     Umbilicated papule    Erythematous-base heme-crusted  tan verrucoid plaque consistent with inflamed seborrheic keratosis     Erythematous Silvery Scaling Plaque c/w Psoriasis     See annotation      Assessment / Plan:        Inflamed seborrheic keratosis  - Discussed diagnosis, etiology, and treatment options.   - Cryosurgery Procedure Note: Discussed procedure with patient/patient's guardian including risks and benefits as well as treatment alternatives. Risks of procedure include pain, itching, swelling, redness, blistering, crusting, wound formation, post-inflammatory pigmentary alteration, scar, recurrence. Verbal consent obtained. LN2 cryosurgery performed to 1 lesion(s). Patient tolerated procedure well. After-visit wound care instructions reviewed and provided in writing.     Photoaging of skin  -     ALTRENO 0.05 % Lotn; Apply pea-sized amount to entire face nightly. Start slow, up-titrate as tolerated.  Dispense: 45 g; Refill: 5  - Discussed diagnosis, etiology, and treatment options.   - Recommended daily sun protection, including the use of OTC broad-spectrum sunscreen (SPF 30 or greater) and sun-protective clothing.     - Start Altreno as above.   - Counseled on potential SE of medication(s) and instructed on use.            Follow up for skin check.

## 2023-09-20 NOTE — PATIENT INSTRUCTIONS
SEBORRHEIC KERATOSES        What causes seborrheic keratoses?    Seborrheic keratoses are harmless, common skin growths that first appear during adult life.  As time goes by, more growths appear.  Some persons have a very large number of them.  Seborrheic keratoses appear on both covered and uncovered parts of the body; they are not caused by sunlight.  The tendency to develop seborrheic keratoses is inherited.    Seborrheic keratoses are harmless and never become malignant.  They begin as slightly raised, light brown spots.  Gradually they thicken and take on a rough wartlike surface.  They slowly darken and may turn black.  These color changes are harmless.  Seborrheic keratoses are superficial and look as if they were stuck on the skin.  Persons who have had several seborrheic keratoses can usually recognize this type of benign growth.  However, if you are concerned or unsure about any growth, consult me.    Treatment    Seborrheic keratoses can easily be removed in the office.  The only reason for removing a seborrheic keratosis is your wish to get rid of it.       Make up remover = Bioderma Sensibio Micelle Water and cotton pad  Gentle cleanser = Cerave Hydrating Cleanser, Cetaphil Gentle Cleanser  Daily moisturizer = La Roche Possay Toleriane Double Repair Moisturizer   Thick moisturizer = La Roche Possay Lipikar Balm   Body moisturizer with retinol = Gold Bond

## 2023-10-04 ENCOUNTER — PATIENT MESSAGE (OUTPATIENT)
Dept: DERMATOLOGY | Facility: CLINIC | Age: 53
End: 2023-10-04
Payer: COMMERCIAL

## 2023-10-05 RX ORDER — TRETINOIN 0.25 MG/G
CREAM TOPICAL NIGHTLY
Qty: 45 G | Refills: 5 | Status: SHIPPED | OUTPATIENT
Start: 2023-10-05

## 2023-12-12 ENCOUNTER — LAB VISIT (OUTPATIENT)
Dept: LAB | Facility: HOSPITAL | Age: 53
End: 2023-12-12
Payer: COMMERCIAL

## 2023-12-12 DIAGNOSIS — Z00.00 ROUTINE GENERAL MEDICAL EXAMINATION AT A HEALTH CARE FACILITY: Primary | ICD-10-CM

## 2023-12-12 DIAGNOSIS — E55.9 VITAMIN D DEFICIENCY DISEASE: ICD-10-CM

## 2023-12-12 DIAGNOSIS — F41.9 ANXIETY: ICD-10-CM

## 2023-12-12 DIAGNOSIS — E78.5 HYPERLIPIDEMIA: ICD-10-CM

## 2023-12-12 DIAGNOSIS — N94.6 DYSMENORRHEA: ICD-10-CM

## 2023-12-12 DIAGNOSIS — R53.83 FATIGUE: ICD-10-CM

## 2023-12-12 DIAGNOSIS — R63.5 WEIGHT GAIN: ICD-10-CM

## 2023-12-12 DIAGNOSIS — M25.50 JOINT PAIN: ICD-10-CM

## 2023-12-12 DIAGNOSIS — R51.9 HEAD ACHE: ICD-10-CM

## 2023-12-12 LAB
25(OH)D3+25(OH)D2 SERPL-MCNC: 48 NG/ML (ref 30–96)
ALBUMIN SERPL BCP-MCNC: 3.9 G/DL (ref 3.5–5.2)
ALP SERPL-CCNC: 51 U/L (ref 55–135)
ALT SERPL W/O P-5'-P-CCNC: 33 U/L (ref 10–44)
ANION GAP SERPL CALC-SCNC: 8 MMOL/L (ref 8–16)
AST SERPL-CCNC: 26 U/L (ref 10–40)
BASOPHILS # BLD AUTO: 0.05 K/UL (ref 0–0.2)
BASOPHILS NFR BLD: 1.4 % (ref 0–1.9)
BILIRUB SERPL-MCNC: 0.8 MG/DL (ref 0.1–1)
BILIRUB UR QL STRIP: NEGATIVE
BUN SERPL-MCNC: 11 MG/DL (ref 6–20)
CALCIUM SERPL-MCNC: 8.9 MG/DL (ref 8.7–10.5)
CHLORIDE SERPL-SCNC: 108 MMOL/L (ref 95–110)
CHOLEST SERPL-MCNC: 285 MG/DL (ref 120–199)
CHOLEST/HDLC SERPL: 3.2 {RATIO} (ref 2–5)
CLARITY UR: ABNORMAL
CO2 SERPL-SCNC: 22 MMOL/L (ref 23–29)
COLOR UR: YELLOW
CORTIS SERPL-MCNC: 10 UG/DL
CREAT SERPL-MCNC: 0.8 MG/DL (ref 0.5–1.4)
DHEA-S SERPL-MCNC: 265.2 UG/DL (ref 56.2–282.9)
DIFFERENTIAL METHOD: ABNORMAL
EOSINOPHIL # BLD AUTO: 0.2 K/UL (ref 0–0.5)
EOSINOPHIL NFR BLD: 4.1 % (ref 0–8)
ERYTHROCYTE [DISTWIDTH] IN BLOOD BY AUTOMATED COUNT: 13.3 % (ref 11.5–14.5)
EST. GFR  (NO RACE VARIABLE): >60 ML/MIN/1.73 M^2
ESTIMATED AVG GLUCOSE: 97 MG/DL (ref 68–131)
ESTRADIOL SERPL-MCNC: 93 PG/ML
FERRITIN SERPL-MCNC: 23 NG/ML (ref 20–300)
FOLATE SERPL-MCNC: 13.9 NG/ML (ref 4–24)
FSH SERPL-ACNC: 2.65 MIU/ML
GLUCOSE SERPL-MCNC: 97 MG/DL (ref 70–110)
GLUCOSE UR QL STRIP: NEGATIVE
HBA1C MFR BLD: 5 % (ref 4–5.6)
HCT VFR BLD AUTO: 42 % (ref 37–48.5)
HDLC SERPL-MCNC: 88 MG/DL (ref 40–75)
HDLC SERPL: 30.9 % (ref 20–50)
HGB BLD-MCNC: 14.3 G/DL (ref 12–16)
HGB UR QL STRIP: NEGATIVE
IMM GRANULOCYTES # BLD AUTO: 0.01 K/UL (ref 0–0.04)
IMM GRANULOCYTES NFR BLD AUTO: 0.3 % (ref 0–0.5)
INSULIN COLLECTION INTERVAL: NORMAL
INSULIN SERPL-ACNC: 4.2 UU/ML
IRON SERPL-MCNC: 149 UG/DL (ref 30–160)
KETONES UR QL STRIP: NEGATIVE
LDLC SERPL CALC-MCNC: 180.2 MG/DL (ref 63–159)
LEUKOCYTE ESTERASE UR QL STRIP: NEGATIVE
LH SERPL-ACNC: 1.3 MIU/ML
LYMPHOCYTES # BLD AUTO: 1.5 K/UL (ref 1–4.8)
LYMPHOCYTES NFR BLD: 42.2 % (ref 18–48)
MCH RBC QN AUTO: 30.4 PG (ref 27–31)
MCHC RBC AUTO-ENTMCNC: 34 G/DL (ref 32–36)
MCV RBC AUTO: 89 FL (ref 82–98)
MONOCYTES # BLD AUTO: 0.4 K/UL (ref 0.3–1)
MONOCYTES NFR BLD: 11.2 % (ref 4–15)
NEUTROPHILS # BLD AUTO: 1.5 K/UL (ref 1.8–7.7)
NEUTROPHILS NFR BLD: 40.8 % (ref 38–73)
NITRITE UR QL STRIP: NEGATIVE
NONHDLC SERPL-MCNC: 197 MG/DL
NRBC BLD-RTO: 0 /100 WBC
PH UR STRIP: 8 [PH] (ref 5–8)
PLATELET # BLD AUTO: 306 K/UL (ref 150–450)
PMV BLD AUTO: 9.4 FL (ref 9.2–12.9)
POTASSIUM SERPL-SCNC: 4.1 MMOL/L (ref 3.5–5.1)
PROGEST SERPL-MCNC: 9 NG/ML
PROLACTIN SERPL IA-MCNC: 12.5 NG/ML (ref 5.2–26.5)
PROT SERPL-MCNC: 6.9 G/DL (ref 6–8.4)
PROT UR QL STRIP: NEGATIVE
RBC # BLD AUTO: 4.71 M/UL (ref 4–5.4)
SODIUM SERPL-SCNC: 138 MMOL/L (ref 136–145)
SP GR UR STRIP: 1.01 (ref 1–1.03)
T3FREE SERPL-MCNC: 2.6 PG/ML (ref 2.3–4.2)
T4 FREE SERPL-MCNC: 0.88 NG/DL (ref 0.71–1.51)
TESTOST SERPL-MCNC: 38 NG/DL (ref 5–73)
THYROGLOB AB SERPL IA-ACNC: <4 IU/ML (ref 0–3.9)
THYROPEROXIDASE IGG SERPL-ACNC: <6 IU/ML
TRIGL SERPL-MCNC: 84 MG/DL (ref 30–150)
TSH SERPL DL<=0.005 MIU/L-ACNC: 2.01 UIU/ML (ref 0.4–4)
URN SPEC COLLECT METH UR: ABNORMAL
VIT B12 SERPL-MCNC: 803 PG/ML (ref 210–950)
WBC # BLD AUTO: 3.65 K/UL (ref 3.9–12.7)

## 2023-12-12 PROCEDURE — 84439 ASSAY OF FREE THYROXINE: CPT | Performed by: NURSE PRACTITIONER

## 2023-12-12 PROCEDURE — 86800 THYROGLOBULIN ANTIBODY: CPT | Performed by: NURSE PRACTITIONER

## 2023-12-12 PROCEDURE — 82607 VITAMIN B-12: CPT | Performed by: NURSE PRACTITIONER

## 2023-12-12 PROCEDURE — 36415 COLL VENOUS BLD VENIPUNCTURE: CPT | Mod: PO | Performed by: NURSE PRACTITIONER

## 2023-12-12 PROCEDURE — 83001 ASSAY OF GONADOTROPIN (FSH): CPT | Performed by: NURSE PRACTITIONER

## 2023-12-12 PROCEDURE — 84443 ASSAY THYROID STIM HORMONE: CPT | Performed by: NURSE PRACTITIONER

## 2023-12-12 PROCEDURE — 83002 ASSAY OF GONADOTROPIN (LH): CPT | Performed by: NURSE PRACTITIONER

## 2023-12-12 PROCEDURE — 82533 TOTAL CORTISOL: CPT | Performed by: NURSE PRACTITIONER

## 2023-12-12 PROCEDURE — 86376 MICROSOMAL ANTIBODY EACH: CPT | Performed by: NURSE PRACTITIONER

## 2023-12-12 PROCEDURE — 84481 FREE ASSAY (FT-3): CPT | Performed by: NURSE PRACTITIONER

## 2023-12-12 PROCEDURE — 84402 ASSAY OF FREE TESTOSTERONE: CPT | Performed by: NURSE PRACTITIONER

## 2023-12-12 PROCEDURE — 82670 ASSAY OF TOTAL ESTRADIOL: CPT | Performed by: NURSE PRACTITIONER

## 2023-12-12 PROCEDURE — 80061 LIPID PANEL: CPT | Performed by: NURSE PRACTITIONER

## 2023-12-12 PROCEDURE — 82627 DEHYDROEPIANDROSTERONE: CPT | Performed by: NURSE PRACTITIONER

## 2023-12-12 PROCEDURE — 84144 ASSAY OF PROGESTERONE: CPT | Performed by: NURSE PRACTITIONER

## 2023-12-12 PROCEDURE — 83525 ASSAY OF INSULIN: CPT | Performed by: NURSE PRACTITIONER

## 2023-12-12 PROCEDURE — 81003 URINALYSIS AUTO W/O SCOPE: CPT | Mod: PO | Performed by: NURSE PRACTITIONER

## 2023-12-12 PROCEDURE — 83036 HEMOGLOBIN GLYCOSYLATED A1C: CPT | Performed by: NURSE PRACTITIONER

## 2023-12-12 PROCEDURE — 82746 ASSAY OF FOLIC ACID SERUM: CPT | Performed by: NURSE PRACTITIONER

## 2023-12-12 PROCEDURE — 82728 ASSAY OF FERRITIN: CPT | Performed by: NURSE PRACTITIONER

## 2023-12-12 PROCEDURE — 82306 VITAMIN D 25 HYDROXY: CPT | Performed by: NURSE PRACTITIONER

## 2023-12-12 PROCEDURE — 82679 ASSAY OF ESTRONE: CPT | Performed by: NURSE PRACTITIONER

## 2023-12-12 PROCEDURE — 84140 ASSAY OF PREGNENOLONE: CPT | Performed by: NURSE PRACTITIONER

## 2023-12-12 PROCEDURE — 84403 ASSAY OF TOTAL TESTOSTERONE: CPT | Performed by: NURSE PRACTITIONER

## 2023-12-12 PROCEDURE — 83540 ASSAY OF IRON: CPT | Performed by: NURSE PRACTITIONER

## 2023-12-12 PROCEDURE — 80053 COMPREHEN METABOLIC PANEL: CPT | Performed by: NURSE PRACTITIONER

## 2023-12-12 PROCEDURE — 84146 ASSAY OF PROLACTIN: CPT | Performed by: NURSE PRACTITIONER

## 2023-12-12 PROCEDURE — 85025 COMPLETE CBC W/AUTO DIFF WBC: CPT | Performed by: NURSE PRACTITIONER

## 2023-12-15 LAB
ESTRONE SERPL-MCNC: 96 PG/ML
TESTOST FREE SERPL-MCNC: 0.7 PG/ML

## 2023-12-18 LAB — PREG SERPL-MCNC: 152 NG/DL (ref 33–248)

## 2024-01-31 DIAGNOSIS — Z12.31 OTHER SCREENING MAMMOGRAM: ICD-10-CM

## 2024-02-06 DIAGNOSIS — Z12.11 COLON CANCER SCREENING: ICD-10-CM

## 2024-02-08 ENCOUNTER — OFFICE VISIT (OUTPATIENT)
Dept: DERMATOLOGY | Facility: CLINIC | Age: 54
End: 2024-02-08
Payer: COMMERCIAL

## 2024-02-08 VITALS — BODY MASS INDEX: 22.96 KG/M2 | HEIGHT: 62 IN | WEIGHT: 124.75 LBS | RESPIRATION RATE: 18 BRPM

## 2024-02-08 DIAGNOSIS — D22.9 MULTIPLE BENIGN NEVI: Primary | ICD-10-CM

## 2024-02-08 DIAGNOSIS — Z12.83 SCREENING FOR SKIN CANCER: ICD-10-CM

## 2024-02-08 DIAGNOSIS — L73.8 SEBACEOUS HYPERPLASIA: ICD-10-CM

## 2024-02-08 DIAGNOSIS — D18.01 CHERRY ANGIOMA: ICD-10-CM

## 2024-02-08 DIAGNOSIS — Z80.8 FAMILY HISTORY OF MALIGNANT MELANOMA: ICD-10-CM

## 2024-02-08 DIAGNOSIS — D23.9 DILATED PORE OF WINER: ICD-10-CM

## 2024-02-08 DIAGNOSIS — Z80.8 FAMILY HISTORY OF NONMELANOMA SKIN CANCER: ICD-10-CM

## 2024-02-08 DIAGNOSIS — L82.1 SK (SEBORRHEIC KERATOSIS): ICD-10-CM

## 2024-02-08 DIAGNOSIS — L81.4 LENTIGINES: ICD-10-CM

## 2024-02-08 DIAGNOSIS — D48.5 NEOPLASM OF UNCERTAIN BEHAVIOR OF SKIN: ICD-10-CM

## 2024-02-08 PROCEDURE — 11102 TANGNTL BX SKIN SINGLE LES: CPT | Mod: S$GLB,,, | Performed by: DERMATOLOGY

## 2024-02-08 PROCEDURE — 3008F BODY MASS INDEX DOCD: CPT | Mod: CPTII,S$GLB,, | Performed by: DERMATOLOGY

## 2024-02-08 PROCEDURE — 99213 OFFICE O/P EST LOW 20 MIN: CPT | Mod: 25,S$GLB,, | Performed by: DERMATOLOGY

## 2024-02-08 PROCEDURE — 99999 PR PBB SHADOW E&M-EST. PATIENT-LVL III: CPT | Mod: PBBFAC,,, | Performed by: DERMATOLOGY

## 2024-02-08 PROCEDURE — 1159F MED LIST DOCD IN RCRD: CPT | Mod: CPTII,S$GLB,, | Performed by: DERMATOLOGY

## 2024-02-08 PROCEDURE — 88305 TISSUE EXAM BY PATHOLOGIST: CPT | Mod: 26,,, | Performed by: DERMATOLOGY

## 2024-02-08 PROCEDURE — 88305 TISSUE EXAM BY PATHOLOGIST: CPT | Mod: PO | Performed by: DERMATOLOGY

## 2024-02-08 NOTE — PROGRESS NOTES
Subjective:      Patient ID:  Maria L Smyth is a 53 y.o. female who presents for   Chief Complaint   Patient presents with    Skin Check     HPI    Established patient.  Here today for total body skin exam.   No personal hx skin cancer. +FHX NMSC and MM (father).   C/o few spots at L lower leg, cyst behind R thigh.   No further complaints today.     Review of Systems    Objective:   Physical Exam   Constitutional: She appears well-developed and well-nourished. She is cooperative. No distress.   HENT:   Head: Normocephalic and atraumatic.   Eyes: Lids are normal. Lids are normal.  Right conjunctiva is not injected. Left conjunctiva is not injected. No conjunctival no injection.   Pulmonary/Chest: No respiratory distress.   Musculoskeletal:      Right lower leg: No edema.      Left lower leg: No edema.   Neurological: She is alert and oriented to person, place, and time. She is not disoriented.   Psychiatric: She has a normal mood and affect. Her speech is normal and behavior is normal. Mood, affect, judgment and thought content normal.   Skin:   Areas Examined (abnormalities noted in diagram):   Scalp / Hair Palpated and Inspected  Head / Face Inspection Performed  Neck Inspection Performed  Chest / Axilla Inspection Performed  Abdomen Inspection Performed  Genitals / Buttocks / Groin Inspection Performed  Back Inspection Performed  RUE Inspected  LUE Inspection Performed  RLE Inspected  LLE Inspection Performed  Nails and Digits Inspection Performed                 Diagram Legend     Erythematous scaling macule/papule c/w actinic keratosis       Vascular papule c/w angioma      Pigmented verrucoid papule/plaque c/w seborrheic keratosis      Yellow umbilicated papule c/w sebaceous hyperplasia      Irregularly shaped tan macule c/w lentigo     1-2 mm smooth white papules consistent with Milia      Movable subcutaneous cyst with punctum c/w epidermal inclusion cyst      Subcutaneous movable cyst c/w pilar cyst       Firm pink to brown papule c/w dermatofibroma      Pedunculated fleshy papule(s) c/w skin tag(s)      Evenly pigmented macule c/w junctional nevus     Mildly variegated pigmented, slightly irregular-bordered macule c/w mildly atypical nevus      Flesh colored to evenly pigmented papule c/w intradermal nevus       Pink pearly papule/plaque c/w basal cell carcinoma      Erythematous hyperkeratotic cursted plaque c/w SCC      Surgical scar with no sign of skin cancer recurrence      Open and closed comedones      Inflammatory papules and pustules      Verrucoid papule consistent consistent with wart     Erythematous eczematous patches and plaques     Dystrophic onycholytic nail with subungual debris c/w onychomycosis     Umbilicated papule    Erythematous-base heme-crusted tan verrucoid plaque consistent with inflamed seborrheic keratosis     Erythematous Silvery Scaling Plaque c/w Psoriasis     See annotation          Assessment / Plan:      Pathology Orders:       Normal Orders This Visit    Specimen to Pathology, Dermatology     Comments:    Number of Specimens:->1  ------------------------->-------------------------  Spec 1 Procedure:->Biopsy  Spec 1 Clinical Impression:->nevus r/o atypia  Spec 1 Source:->right upper inner arm  Release to patient->Immediate    Questions:    Procedure Type: Dermatology and skin neoplasms    Number of Specimens: 1    ------------------------: -------------------------    Spec 1 Procedure: Biopsy    Spec 1 Clinical Impression: nevus r/o atypia    Spec 1 Source: right upper inner arm    Release to patient: Immediate          Neoplasm of uncertain behavior of skin  -     Specimen to Pathology, Dermatology  - Discussed diagnosis with patient and explained uncertain nature of condition, including differential DDX.   - Discussed treatment options (biopsy, close monitoring) with patient, including the risks and benefits of each. Patient opted to pursue biopsy.  - Shave Biopsy Procedure Note:  Discussed procedure with patient/patient's guardian including risks and benefits as well as treatment alternatives. Risks of procedure include pain, bleeding, infection, post-inflammatory pigmentary alteration, scar, recurrence. Patient informed that the purpose of a biopsy is sampling of condition in question rather than removal in entirety; further treatment may be necessary. Verbal consent obtained. Area to be biopsied marked and cleansed with alcohol. Local anesthesia achieved by injecting approximately 1 cc of 1% lidocaine with epinephrine. One shave biopsy performed using a double edge razor blade; specimen submitted to pathology. Hemostasis achieved with aluminum chloride. Petroleum jelly and bandage applied to wound. Patient tolerated procedure well. After-visit wound care instructions reviewed and provided in writing.     Multiple benign nevi  - Discussed diagnosis, etiology, and benign-nature of condition.  - Reassured; no lesions suspicious for malignancy noted on exam today.   - Recommended routine self examination of skin. Discussed the ABCDEs of melanoma and ugly duckling sign.   - Recommended daily sun protection, including the use of OTC broad-spectrum sunscreen (SPF 30 or greater) and sun-protective clothing.      Lentigines  - Benign; reassured treatment not necessary.   - Recommended daily sun protection, including the use of OTC broad-spectrum sunscreen (SPF 30 or greater) and sun-protective clothing.       SK (seborrheic keratosis)  Cherry angioma  Sebaceous hyperplasia  Dilated pore of Veronica  - Benign; reassured treatment not necessary.      Family history of malignant melanoma  Family history of nonmelanoma skin cancer  Screening for skin cancer  - Total body skin examination performed today.  - Findings listed above.   - Recommended routine self examination of skin.    - Recommended daily sun protection, including the use of OTC broad-spectrum sunscreen (SPF 30 or greater) and sun-protective  clothing.           Follow up for annual skin checks, sooner PRN, sooner pending pathology.

## 2024-02-09 ENCOUNTER — PATIENT MESSAGE (OUTPATIENT)
Dept: DERMATOLOGY | Facility: CLINIC | Age: 54
End: 2024-02-09
Payer: COMMERCIAL

## 2024-02-09 ENCOUNTER — LAB VISIT (OUTPATIENT)
Dept: LAB | Facility: HOSPITAL | Age: 54
End: 2024-02-09
Payer: COMMERCIAL

## 2024-02-09 DIAGNOSIS — E34.9 ENDOCRINE DISORDER: ICD-10-CM

## 2024-02-09 DIAGNOSIS — R53.83 FATIGUE: ICD-10-CM

## 2024-02-09 DIAGNOSIS — Z79.899 ENCOUNTER FOR LONG-TERM (CURRENT) USE OF OTHER MEDICATIONS: ICD-10-CM

## 2024-02-09 LAB — TESTOST SERPL-MCNC: 32 NG/DL (ref 5–73)

## 2024-02-09 PROCEDURE — 84403 ASSAY OF TOTAL TESTOSTERONE: CPT | Performed by: NURSE PRACTITIONER

## 2024-02-09 PROCEDURE — 36415 COLL VENOUS BLD VENIPUNCTURE: CPT | Mod: PO | Performed by: NURSE PRACTITIONER

## 2024-02-09 PROCEDURE — 84402 ASSAY OF FREE TESTOSTERONE: CPT | Performed by: NURSE PRACTITIONER

## 2024-02-09 PROCEDURE — 82175 ASSAY OF ARSENIC: CPT | Mod: 91 | Performed by: NURSE PRACTITIONER

## 2024-02-09 PROCEDURE — 82300 ASSAY OF CADMIUM: CPT | Performed by: NURSE PRACTITIONER

## 2024-02-09 NOTE — PATIENT INSTRUCTIONS
Shave Biopsy Wound Care    Your doctor has performed a shave biopsy today.  A band aid and vaseline ointment has been placed over the site.  This should remain in place for NO LONGER THAN 48 hours.  It is fine to remove the bandaid after 24 hours, if the area is no longer bleeding. It is recommended that you keep the area dry (do not wet)) for the first 24 hours.  After 24 hours, wash the area with warm soap and water and apply Vaseline jelly.  Many patients prefer to use Neosporin or Bacitracin ointment.  This is acceptable; however, know that you can develop an allergy to this medication even if you have used it safely for years.  It is important to keep the area moist.  Letting it dry out and get air slows healing time, and will worsen the scar.        If you notice increasing redness, tenderness, pain, or yellow drainage at the biopsy site, please notify your doctor.  These are signs of an infection.    If your biopsy site is bleeding, apply firm pressure for 15 minutes straight.  Repeat for another 15 minutes, if it is still bleeding.   If the surgical site continues to bleed, then please contact your doctor.      For [facility] users:   You will receive your biopsy results in [facility] as soon as they are available. Please be assured that your physician/provider will review your results and will then determine what further treatment, evaluation, or planning is required. You should be contacted by your physician's/provider's office within 5 business days of receiving your results; If not, please reach out to directly. This is one more way [facility] is putting you first.     [facility] [place] [address]/ [phone] FAX/ [email or web address]     What Are the Symptoms of Skin Cancer?  A change in your skin is the most common sign of skin cancer. This could be a new growth, a sore that doesnt heal, or a change in a mole. Not all skin cancers look the same.    For melanoma specifically,  a simple way to remember the warning signs is to remember the A-B-C-D-Es of melanoma--    A stands for asymmetrical. Does the mole or spot have an irregular shape with two parts that look very different?  B stands for border. Is the border irregular or jagged?  C is for color. Is the color uneven?  D is for diameter. Is the mole or spot larger than the size of a pea?  E is for evolving. Has the mole or spot changed during the past few weeks or months?    Talk to your doctor if you notice changes in your skin such as a new growth, a sore that doesnt heal, a change in an old growth, or any of the A-B-C-D-Es of melanoma    What Can I Do to Reduce My Risk of Skin Cancer?  Protection from ultraviolet (UV) radiation is important all year, not just during the summer or at the beach. UV rays from the sun can reach you on cloudy and hazy days, not just on bright and nancy days. UV rays also reflect off of surfaces like water, cement, sand, and snow. Indoor tanning (using a tanning bed, haywrad, or sunlamp to get tan) exposes users to UV radiation.    The hours between 10 a.m. and 4 p.m. Daylight Saving Time (9 a.m. to 3 p.m. standard time) are the most hazardous for UV exposure outdoors in the continental [place]. UV rays from sunlight are the greatest during the late spring and early summer in [place].    CDC recommends easy options for protection from UV radiation--    Stay in the shade or indoors, especially during midday hours.  Wear clothing that covers your arms and legs.  Wear a hat with a wide brim to shade your face, head, ears, and neck.  Wear sunglasses that wrap around and block both UVA and UVB rays.  Use sunscreen with a sun protection factor (SPF) of 30 or higher, and both UVA and UVB (broad spectrum) protection.  Avoid indoor tanning.    Adapted from https://www.cdc.gov/cancer/skin/basic_info/

## 2024-02-12 LAB
ARSENIC UR-MCNC: 20 MCG/L
ARSENIC/CREAT UR: 29 MCG/G CR
CADMIUM/CREAT UR: <0.5 MCG/G CR
CREAT UR-MCNC: 70 MG/DL (ref 16–326)
LEAD/CREAT UR: <1 MCG/G CR
MERCURY/CREAT UR: <2 MCG/G CR

## 2024-02-13 LAB
ARSENIC ORGANIC UR-MCNC: 17 MCG/L
ARSENIC UR-MCNC: 20 MCG/L
ARSENIC.INORGANIC+METHYLATED UR-MCNC: <3 MCG/L
ARSENIC.METHYLATED UR-MCNC: <3 MCG/L
INORG ARSENIC UR-MCNC: <3 MCG/L

## 2024-02-14 LAB
FINAL PATHOLOGIC DIAGNOSIS: NORMAL
GROSS: NORMAL
Lab: NORMAL
MICROSCOPIC EXAM: NORMAL
TESTOST FREE SERPL-MCNC: 0.5 PG/ML

## 2024-02-14 NOTE — PROGRESS NOTES
1. Skin, right upper inner arm, shave biopsy:   - Junctional nevus    Benign; reassured further treatment not necessary via portal message.

## 2024-03-22 ENCOUNTER — OFFICE VISIT (OUTPATIENT)
Dept: OBSTETRICS AND GYNECOLOGY | Facility: CLINIC | Age: 54
End: 2024-03-22
Payer: COMMERCIAL

## 2024-03-22 VITALS
HEIGHT: 62 IN | WEIGHT: 123.44 LBS | BODY MASS INDEX: 22.71 KG/M2 | SYSTOLIC BLOOD PRESSURE: 112 MMHG | DIASTOLIC BLOOD PRESSURE: 70 MMHG

## 2024-03-22 DIAGNOSIS — N94.6 DYSMENORRHEA: Primary | ICD-10-CM

## 2024-03-22 DIAGNOSIS — R10.2 PELVIC PRESSURE IN FEMALE: ICD-10-CM

## 2024-03-22 PROCEDURE — 3008F BODY MASS INDEX DOCD: CPT | Mod: CPTII,S$GLB,, | Performed by: STUDENT IN AN ORGANIZED HEALTH CARE EDUCATION/TRAINING PROGRAM

## 2024-03-22 PROCEDURE — 99999 PR PBB SHADOW E&M-EST. PATIENT-LVL IV: CPT | Mod: PBBFAC,,, | Performed by: STUDENT IN AN ORGANIZED HEALTH CARE EDUCATION/TRAINING PROGRAM

## 2024-03-22 PROCEDURE — 3078F DIAST BP <80 MM HG: CPT | Mod: CPTII,S$GLB,, | Performed by: STUDENT IN AN ORGANIZED HEALTH CARE EDUCATION/TRAINING PROGRAM

## 2024-03-22 PROCEDURE — 3074F SYST BP LT 130 MM HG: CPT | Mod: CPTII,S$GLB,, | Performed by: STUDENT IN AN ORGANIZED HEALTH CARE EDUCATION/TRAINING PROGRAM

## 2024-03-22 PROCEDURE — 99213 OFFICE O/P EST LOW 20 MIN: CPT | Mod: S$GLB,,, | Performed by: STUDENT IN AN ORGANIZED HEALTH CARE EDUCATION/TRAINING PROGRAM

## 2024-03-22 PROCEDURE — 1159F MED LIST DOCD IN RCRD: CPT | Mod: CPTII,S$GLB,, | Performed by: STUDENT IN AN ORGANIZED HEALTH CARE EDUCATION/TRAINING PROGRAM

## 2024-03-22 NOTE — PROGRESS NOTES
History & Physical  Gynecology      SUBJECTIVE:     Chief Complaint: PELVIC PRESSURE       History of Present Illness:    Here today for dysmenorrhea. Also past few weeks has some pelvic pressure. Did some jumping jacks at gym and worried may have overdone it. Past 3 periods have been regular, light, but more painful than usual. No other menopausal symptoms. Still exercising.       Review of patient's allergies indicates:   Allergen Reactions    Macrobid  [nitrofurantoin monohyd/m-cryst] Hives and Anaphylaxis     Other reaction(s): Throat closing    Corn Swelling    Effexor  [venlafaxine]      Other reaction(s): dysphagia    Eggs [egg derived] Hives    Wheat Swelling    Doxycycline Itching and Rash       Past Medical History:   Diagnosis Date    Abnormal Pap smear of cervix     Allergic conjunctivitis     ALLERGIC RHINITIS     Atopic dermatitis     Food allergy 2012    Immunotherapy     Migraine headache      Past Surgical History:   Procedure Laterality Date    SHOULDER ARTHROSCOPY  2011    right rotator cuff repair    SINUS SURGERY      TONSILLECTOMY  childhood     OB History          3    Para   3    Term   3            AB        Living   3         SAB        IAB        Ectopic        Multiple        Live Births   3               Family History   Problem Relation Age of Onset    Allergic rhinitis Son     Allergic rhinitis Son     Asthma Son     Atopy Son     Eczema Son     Cancer Maternal Grandmother     Cancer Maternal Aunt 40        adenoca lung, breast (Non-smoker)    Diabetes Maternal Aunt     Heart disease Mother 60        s/p cabg    Retinitis pigmentosa Mother     Breast cancer Mother     Heart disease Father 62        s/p cabg    Cancer Father         bladder ca (+tob)    Diabetes Father         pre-diabetes    Cancer Maternal Grandfather         NHL    Immunodeficiency Neg Hx     Urticaria Neg Hx     Angioedema Neg Hx     Allergies Neg Hx     Rhinitis Neg Hx     Ovarian cancer  Neg Hx     Colon cancer Neg Hx     Melanoma Neg Hx      Social History     Tobacco Use    Smoking status: Never    Smokeless tobacco: Never   Substance Use Topics    Alcohol use: Yes     Comment: social    Drug use: No       Current Outpatient Medications   Medication Sig    ALTRENO 0.05 % Lotn Apply pea-sized amount to entire face nightly. Start slow, up-titrate as tolerated.    B-complex with vitamin C (Z-BEC OR EQUIV) tablet Take 1 tablet by mouth once daily.    cetirizine (ZYRTEC) 10 MG tablet Take 10 mg by mouth as needed.     EPINEPHrine (EPIPEN) 0.3 mg/0.3 mL AtIn Use as directed    ergocalciferol (ERGOCALCIFEROL) 50,000 unit Cap Take 1 capsule (50,000 Units total) by mouth every 7 days.    estradioL (CLIMARA) 0.075 mg/24 hr Apply 1 patch topically once weekly (Patient not taking: Reported on 9/20/2023)    fish oil-omega-3 fatty acids 300-1,000 mg capsule Take 2 g by mouth once daily.    progesterone (PROMETRIUM) 200 MG capsule Take 1 capsule (200 mg total) by mouth nightly. (Patient not taking: Reported on 9/20/2023)    rizatriptan (MAXALT) 10 MG tablet Take 1 tablet by mouth daily as needed for migraine / headaches.    tretinoin (RETIN-A) 0.025 % cream Apply pea-sized amount to entire face nightly. Start slow, up-titrate as tolerated.    tretinoin (RETIN-A) 0.05 % cream Apply topically every evening.     No current facility-administered medications for this visit.         Review of Systems:  Review of Systems   Constitutional:  Negative for chills, fatigue and fever.   HENT:  Negative for congestion.    Eyes:  Negative for visual disturbance.   Respiratory:  Negative for cough and shortness of breath.    Cardiovascular:  Negative for chest pain and palpitations.   Gastrointestinal:  Negative for abdominal distention, abdominal pain, constipation, diarrhea, nausea and vomiting.   Genitourinary:  Negative for difficulty urinating, dysuria, hematuria, vaginal bleeding and vaginal discharge.   Skin:  Negative  for rash.   Neurological:  Negative for dizziness, seizures, light-headedness and headaches.   Hematological:  Does not bruise/bleed easily.   Psychiatric/Behavioral:  Negative for dysphoric mood. The patient is not nervous/anxious.         OBJECTIVE:     Physical Exam:  Physical Exam  Vitals reviewed.   Constitutional:       General: She is not in acute distress.     Appearance: Normal appearance. She is well-developed.   HENT:      Head: Normocephalic and atraumatic.   Cardiovascular:      Rate and Rhythm: Normal rate and regular rhythm.   Pulmonary:      Effort: Pulmonary effort is normal.   Abdominal:      General: There is no distension.      Palpations: Abdomen is soft.   Genitourinary:     Vagina: Normal.      Comments: Normal external female genitalia, normal hair distribution. Vaginal mucosa pink, moist, well rugated, scant white physiologic discharge. No blood in vault. Cervix pink, non-friable, without lesion. No CMT. Uterus non tender, mobile, not enlarged. Adnexa without fullness or tenderness.    5/5/ kegel. No prolapse noted   Skin:     General: Skin is warm.   Neurological:      Mental Status: She is alert and oriented to person, place, and time.   Psychiatric:         Behavior: Behavior normal.         Thought Content: Thought content normal.         Judgment: Judgment normal.           ASSESSMENT:       ICD-10-CM ICD-9-CM    1. Dysmenorrhea  N94.6 625.3 US Pelvis Comp with Transvag NON-OB (xpd      2. Pelvic pressure in female  R10.2 625.8 US Pelvis Comp with Transvag NON-OB (xpd          Orders Placed This Encounter   Procedures    US Pelvis Comp with Transvag NON-OB (xpd     Standing Status:   Future     Standing Expiration Date:   3/22/2025     Order Specific Question:   May the Radiologist modify the order per protocol to meet the clinical needs of the patient?     Answer:   Yes     Order Specific Question:   Release to patient     Answer:   Immediate           Plan:      Dysmenorrhea:  - get  US to r/o structural causes   - discussed options including expectant management 2) pre medicating with cyclic NSAIDs, 3) OCPs  - asked questions about hyst, can consider if medical treatment doesn't work.     Pelvic pressure  - reassurance based off of exam  - discussed pelvic floor PT to help strengthen pelvic floor- will think about it and let me know    Twila Villarreal M.D.  Obstetrics and Gynecology

## 2024-03-27 ENCOUNTER — HOSPITAL ENCOUNTER (OUTPATIENT)
Dept: RADIOLOGY | Facility: HOSPITAL | Age: 54
Discharge: HOME | End: 2024-03-27
Attending: FAMILY MEDICINE
Payer: COMMERCIAL

## 2024-03-27 DIAGNOSIS — Z12.31 OTHER SCREENING MAMMOGRAM: ICD-10-CM

## 2024-03-27 PROCEDURE — 77067 SCR MAMMO BI INCL CAD: CPT | Mod: TC,PN

## 2024-03-27 PROCEDURE — 77067 SCR MAMMO BI INCL CAD: CPT | Mod: 26,,, | Performed by: RADIOLOGY

## 2024-03-27 PROCEDURE — 77063 BREAST TOMOSYNTHESIS BI: CPT | Mod: 26,,, | Performed by: RADIOLOGY

## 2024-04-19 RX ORDER — RIZATRIPTAN BENZOATE 10 MG/1
TABLET ORAL
Qty: 30 TABLET | Refills: 3 | Status: CANCELLED | OUTPATIENT
Start: 2024-04-19

## 2024-04-22 RX ORDER — RIZATRIPTAN BENZOATE 10 MG/1
TABLET ORAL
Qty: 30 TABLET | Refills: 3 | Status: SHIPPED | OUTPATIENT
Start: 2024-04-22 | End: 2024-05-08 | Stop reason: SDUPTHER

## 2024-05-08 ENCOUNTER — OFFICE VISIT (OUTPATIENT)
Dept: FAMILY MEDICINE | Facility: CLINIC | Age: 54
End: 2024-05-08
Payer: COMMERCIAL

## 2024-05-08 VITALS
BODY MASS INDEX: 23.91 KG/M2 | RESPIRATION RATE: 18 BRPM | HEIGHT: 62 IN | SYSTOLIC BLOOD PRESSURE: 130 MMHG | OXYGEN SATURATION: 98 % | DIASTOLIC BLOOD PRESSURE: 70 MMHG | WEIGHT: 129.94 LBS | HEART RATE: 62 BPM | TEMPERATURE: 98 F

## 2024-05-08 DIAGNOSIS — M25.562 LEFT KNEE PAIN, UNSPECIFIED CHRONICITY: Primary | ICD-10-CM

## 2024-05-08 DIAGNOSIS — E78.49 OTHER HYPERLIPIDEMIA: ICD-10-CM

## 2024-05-08 DIAGNOSIS — M25.562 ACUTE PAIN OF LEFT KNEE: ICD-10-CM

## 2024-05-08 DIAGNOSIS — G43.809 OTHER MIGRAINE WITHOUT STATUS MIGRAINOSUS, NOT INTRACTABLE: ICD-10-CM

## 2024-05-08 DIAGNOSIS — Z12.11 SCREENING FOR COLON CANCER: ICD-10-CM

## 2024-05-08 DIAGNOSIS — Z00.01 ANNUAL VISIT FOR GENERAL ADULT MEDICAL EXAMINATION WITH ABNORMAL FINDINGS: Primary | ICD-10-CM

## 2024-05-08 PROCEDURE — 3075F SYST BP GE 130 - 139MM HG: CPT | Mod: CPTII,S$GLB,, | Performed by: FAMILY MEDICINE

## 2024-05-08 PROCEDURE — 99999 PR PBB SHADOW E&M-EST. PATIENT-LVL III: CPT | Mod: PBBFAC,,, | Performed by: FAMILY MEDICINE

## 2024-05-08 PROCEDURE — 3008F BODY MASS INDEX DOCD: CPT | Mod: CPTII,S$GLB,, | Performed by: FAMILY MEDICINE

## 2024-05-08 PROCEDURE — 99396 PREV VISIT EST AGE 40-64: CPT | Mod: S$GLB,,, | Performed by: FAMILY MEDICINE

## 2024-05-08 PROCEDURE — 3078F DIAST BP <80 MM HG: CPT | Mod: CPTII,S$GLB,, | Performed by: FAMILY MEDICINE

## 2024-05-08 RX ORDER — EPINEPHRINE 0.3 MG/.3ML
INJECTION SUBCUTANEOUS
Qty: 2 EACH | Refills: 3 | Status: SHIPPED | OUTPATIENT
Start: 2024-05-08

## 2024-05-08 RX ORDER — CHOLECALCIFEROL (VITAMIN D3) 25 MCG
3000 TABLET ORAL DAILY
COMMUNITY

## 2024-05-08 RX ORDER — RIZATRIPTAN BENZOATE 10 MG/1
TABLET ORAL
Qty: 30 TABLET | Refills: 3 | Status: SHIPPED | OUTPATIENT
Start: 2024-05-08

## 2024-05-09 ENCOUNTER — HOSPITAL ENCOUNTER (OUTPATIENT)
Dept: RADIOLOGY | Facility: HOSPITAL | Age: 54
Discharge: HOME | End: 2024-05-09
Attending: ORTHOPAEDIC SURGERY
Payer: COMMERCIAL

## 2024-05-09 ENCOUNTER — OFFICE VISIT (OUTPATIENT)
Dept: ORTHOPEDICS | Facility: CLINIC | Age: 54
End: 2024-05-09
Payer: COMMERCIAL

## 2024-05-09 DIAGNOSIS — M22.2X2 PATELLOFEMORAL PAIN SYNDROME OF BOTH KNEES: Primary | ICD-10-CM

## 2024-05-09 DIAGNOSIS — M22.2X1 PATELLOFEMORAL PAIN SYNDROME OF BOTH KNEES: Primary | ICD-10-CM

## 2024-05-09 DIAGNOSIS — M25.562 LEFT KNEE PAIN, UNSPECIFIED CHRONICITY: ICD-10-CM

## 2024-05-09 PROCEDURE — 73560 X-RAY EXAM OF KNEE 1 OR 2: CPT | Mod: 26,59,RT, | Performed by: RADIOLOGY

## 2024-05-09 PROCEDURE — 73560 X-RAY EXAM OF KNEE 1 OR 2: CPT | Mod: TC,PO,RT

## 2024-05-09 PROCEDURE — 99999 PR PBB SHADOW E&M-EST. PATIENT-LVL III: CPT | Mod: PBBFAC,,, | Performed by: ORTHOPAEDIC SURGERY

## 2024-05-09 PROCEDURE — 73562 X-RAY EXAM OF KNEE 3: CPT | Mod: TC,PO,LT

## 2024-05-09 PROCEDURE — 1160F RVW MEDS BY RX/DR IN RCRD: CPT | Mod: CPTII,S$GLB,, | Performed by: ORTHOPAEDIC SURGERY

## 2024-05-09 PROCEDURE — 1159F MED LIST DOCD IN RCRD: CPT | Mod: CPTII,S$GLB,, | Performed by: ORTHOPAEDIC SURGERY

## 2024-05-09 PROCEDURE — 99214 OFFICE O/P EST MOD 30 MIN: CPT | Mod: S$GLB,,, | Performed by: ORTHOPAEDIC SURGERY

## 2024-05-09 PROCEDURE — 73562 X-RAY EXAM OF KNEE 3: CPT | Mod: 26,LT,, | Performed by: RADIOLOGY

## 2024-05-09 RX ORDER — METHOCARBAMOL 750 MG/1
750 TABLET, FILM COATED ORAL 4 TIMES DAILY PRN
Qty: 44 TABLET | Refills: 3 | Status: SHIPPED | OUTPATIENT
Start: 2024-05-09

## 2024-05-13 NOTE — PROGRESS NOTES
Assessment:       1. Annual visit for general adult medical examination with abnormal findings    2. Screening for colon cancer    3. Other hyperlipidemia    4. Other migraine without status migrainosus, not intractable    5. Acute pain of left knee        Assessment & Plan  Annual visit for general adult medical examination with abnormal findings  -     APOLIPOPROTEIN A1 AND B; Future; Expected date: 05/08/2024  -     CBC Auto Differential; Future; Expected date: 05/08/2024  -     Comprehensive Metabolic Panel; Future; Expected date: 05/08/2024  -     Lipid Panel; Future; Expected date: 05/08/2024  -     CRP, High Sensitivity; Future; Expected date: 05/08/2024    Screening for colon cancer  -     Cologuard Screening (Multitarget Stool DNA); Future; Expected date: 05/08/2024    Other hyperlipidemia:  Uncontrolled  -     APOLIPOPROTEIN A1 AND B; Future; Expected date: 05/08/2024  -     Lipid Panel; Future; Expected date: 05/08/2024  -     CRP, High Sensitivity; Future; Expected date: 05/08/2024    Other migraine without status migrainosus, not intractable: Stable  -     rizatriptan (MAXALT) 10 MG tablet; Take 1 tablet by mouth daily as needed for migraine / headaches.  Dispense: 30 tablet; Refill: 3    Acute pain of left knee: New problem, the patient has an appointment to see the orthopedic specialist tomorrow.    Other orders  -     EPINEPHrine (EPIPEN) 0.3 mg/0.3 mL AtIn; Use as directed. Inject into the muscle as needed for anaphylactic reaction.  Dispense: 2 each; Refill: 3     The patient is advised to receive the shingles vaccine at her local pharmacy. A Cologuard test will be ordered. Fasting blood work, including cholesterol, white count, CMP, CRP, and apolipoprotein A1 and B, will be ordered.    Follow-up  The patient is scheduled for a follow-up visit in 1 year.         Patient agreed with assessment and plan. Patient verbalized understanding.     Subjective:       Patient ID: Maria L Smyth is a 53 y.o.  female.    Chief Complaint: Annual Exam    HPI  History of Present Illness  The patient is a 53-year-old female who presents for health maintenance.    The patient requires a refill of her epinephrine pen, a medication she has previously used due to allergic reactions. She has discontinued the use of estradiol and has resumed progesterone for sleep and mood regulation, which she reports as beneficial. She expresses a desire to maintain this regimen for the time being. She has not yet received the shingles vaccine. She has not undergone a colonoscopy but expresses interest in Cologuard. Her gynecologist,  has been monitoring her hormone levels due to a suspicion of perimenopause. Her gynecologist has checked her testosterone, free, and testosterone levels. She has a scheduled appointment with her gynecologist at the end of 05/2024. Her calcium score was recorded as 0. She maintains a healthy diet but expresses a desire to lose 10 pounds. Her last menstrual cycle was on 04/08/2024.    The patient requires a refill of her Maxalt prescription, which was previously prescribed by Dr. Smyth.    Supplemental Information  She is having some knee pain. She is going to see Dr. Lopez tomorrow.   She has 3 children.   Both of her parents came back positive for arsenic. Her father was 62 and her mother was 60. Her mother was anemic, had ulcers, and her kidney function was abnormal. Her mother had estrogen-driven breast cancer. Both of her parents have had bypass surgery. Her parents did not smoke.     Past medical history, past social history was reviewed and discussed with the patient.    Review of Systems   Constitutional:  Negative for activity change, appetite change and chills.   HENT:  Negative for congestion and ear discharge.    Eyes:  Negative for discharge and itching.   Respiratory:  Negative for choking and chest tightness.    Cardiovascular:  Negative for chest pain, palpitations and leg swelling.    Gastrointestinal:  Negative for abdominal distention, abdominal pain and constipation.   Endocrine: Negative for cold intolerance and heat intolerance.   Genitourinary:  Negative for dysuria and flank pain.   Musculoskeletal:  Positive for arthralgias. Negative for back pain.   Skin:  Negative for pallor and rash.   Allergic/Immunologic: Negative for environmental allergies and food allergies.   Neurological:  Negative for dizziness, facial asymmetry and headaches.   Hematological:  Negative for adenopathy. Does not bruise/bleed easily.   Psychiatric/Behavioral:  Negative for agitation and confusion.        Objective:      Physical Exam  Vitals and nursing note reviewed.   Constitutional:       General: She is not in acute distress.     Appearance: Normal appearance. She is well-developed and normal weight. She is not diaphoretic.   HENT:      Head: Normocephalic and atraumatic.      Right Ear: External ear normal.      Left Ear: External ear normal.      Nose: Nose normal.      Mouth/Throat:      Pharynx: No oropharyngeal exudate.   Eyes:      General: No scleral icterus.        Right eye: No discharge.         Left eye: No discharge.      Conjunctiva/sclera: Conjunctivae normal.      Pupils: Pupils are equal, round, and reactive to light.   Cardiovascular:      Rate and Rhythm: Normal rate and regular rhythm.      Heart sounds: Normal heart sounds.   Pulmonary:      Effort: Pulmonary effort is normal. No respiratory distress.      Breath sounds: Normal breath sounds. No wheezing.   Abdominal:      General: Abdomen is flat. Bowel sounds are normal. There is no distension.      Tenderness: There is no abdominal tenderness.   Musculoskeletal:         General: No tenderness or deformity.      Cervical back: Neck supple.   Skin:     Coloration: Skin is not pale.   Neurological:      Mental Status: She is alert.      Cranial Nerves: No cranial nerve deficit.   Psychiatric:         Behavior: Behavior normal.          Thought Content: Thought content normal.         Judgment: Judgment normal.         Physical Exam  Vital Signs  The patient's blood pressure is 130/70, pulse rate is 62, and weight is 129 pounds.     Results  Laboratory Studies  Insulin levels were normal at 4.2. Hemoglobin A1c was normal. Prolactin levels were normal. Progesterone levels were better at 9.0. Cortisol levels were normal. Iron levels were normal. Folic acid was normal. Vitamin D levels were normal. Cholesterol was 285. HDL were elevated and triglycerides were normal. White blood cell count was slightly low.     This note was generated with the assistance of ambient listening technology. Verbal consent was obtained by the patient and accompanying visitor(s) for the recording of patient appointment to facilitate this note. I attest to having reviewed and edited the generated note for accuracy, though some syntax or spelling errors may persist. Please contact the author of this note for any clarification.

## 2024-05-15 ENCOUNTER — CLINICAL SUPPORT (OUTPATIENT)
Dept: REHABILITATION | Facility: HOSPITAL | Age: 54
End: 2024-05-15
Payer: COMMERCIAL

## 2024-05-15 DIAGNOSIS — M22.2X2 PATELLOFEMORAL PAIN SYNDROME OF BOTH KNEES: ICD-10-CM

## 2024-05-15 DIAGNOSIS — M22.2X1 PATELLOFEMORAL PAIN SYNDROME OF BOTH KNEES: ICD-10-CM

## 2024-05-15 DIAGNOSIS — R29.898 WEAKNESS OF BOTH HIPS: Primary | ICD-10-CM

## 2024-05-15 PROCEDURE — 97530 THERAPEUTIC ACTIVITIES: CPT | Mod: PO

## 2024-05-15 PROCEDURE — 97161 PT EVAL LOW COMPLEX 20 MIN: CPT | Mod: PO

## 2024-05-15 NOTE — PLAN OF CARE
"[facility]  Physical Therapy Initial Evaluation      Date: 5/15/2024   Name: Maria L Smyth  Clinic Number: [id number]    Therapy Diagnosis:   Encounter Diagnoses   Name Primary?    Patellofemoral pain syndrome of both knees     Weakness of both hips Yes     Physician: Vidal Lopez MD    Physician Orders: PT Eval and Treat   Medical Diagnosis from Referral:  Patellofemoral pain syndrome of both knees   Evaluation Date: 5/15/2024  Authorization Period Expiration: 5/9/2025  Plan of Care Expiration: 7/16/2024  Progress Note Due: 6/16/2024  Visit # / Visits authorized: 1/ 1   FOTO: 1/3    Precautions: Standard     Time In: 1500  Time Out: 1555  Total Appointment Time (timed & untimed codes): 55 minutes      SUBJECTIVE   Date of onset: 2 weeks ago    History of current condition - Maria L reports: was doing a workout at [employer] and injured her left knee. Dr. Lopez ordered an x-ray which revealed mild arthritis. Possibly injured on a lunge. Patient is unable to bend her knee without it getting stuck. Knee pain is improving but does have audible clicking while going up stairs. She is currently unable to workout, squat and bend her knee without pain. Also has right hip pain. Gym 3x/week.     Falls: -    Imaging, x-ray: see file    Prior Therapy: none  Social History:  lives with their spouse  Occupation:    Prior Level of Function: able to workout without pain  Current Level of Function: unable to workout     Pain:  Current 0/10, worst 2/10, best 0/10   Location: left knee   Description:Aching and Dull  Aggravating Factors: see above  Easing Factors: brace     Patients goals: "to be able to workout"      Medical History:   Past Medical History:   Diagnosis Date    Abnormal Pap smear of cervix     Allergic conjunctivitis     ALLERGIC RHINITIS     Atopic dermatitis     Food allergy 9/24/2012    Immunotherapy     Migraine headache        Surgical History:   Maria L" Haja  has a past surgical history that includes Shoulder arthroscopy (2011); Sinus surgery (); and Tonsillectomy (childhood).    Medications:   Maria L has a current medication list which includes the following prescription(s): altreno, b-complex with vitamin c, cetirizine, epinephrine, fish oil-omega-3 fatty acids, methocarbamol, progesterone, rizatriptan, tretinoin, and vitamin d.    Allergies:   Review of patient's allergies indicates:   Allergen Reactions    Macrobid  [nitrofurantoin monohyd/m-cryst] Hives and Anaphylaxis     Other reaction(s): Throat closing    Corn Swelling    Effexor  [venlafaxine]      Other reaction(s): dysphagia    Eggs [egg derived] Hives    Wheat Swelling    Doxycycline Itching and Rash        Objective            Range of Motion:   Knee Left active Left Passive Right Active R passive   Flexion 145 150  (pain) 145 150   Extension Hyper 4 Hyper 8 (pain) Hyper 4 Hyper 10     Hip ER/IR  Right:35/15 pinch   Left: 45/30        Lower Extremity Strength  Right LE  Left LE    Knee extension: 4+/5 Knee extension: 4/5   Knee flexion: 4+/5 Knee flexion: 4/5   Hip flexion: 4+/5 Hip flexion: 4/5   Hip extension:  4/5 Hip extension: 4-/5   Hip abduction: 4/5 Hip abduction: 4-/5   Hip adduction: 5/5 Hip adduction 4+/5   Ankle dorsiflexion: 5/5 Ankle dorsiflexion: 5/5   Ankle plantarflexion: 4/5 Ankle plantarflexion: 4/5           Special Tests:   Left Right   Valgus Stress Test - -   Varus Stress test - -   Lachman's test - -   Posterior Lachman - -   Marla's Test + -   Apley's Compression + -   Thessaly's Test + -   Patellar Grind Test + +            Function:    - SLS R: 10  - SLS L: 10      Right Extension      Right Hamstring    Objective Testing     Hand-held dynamometry:   Right  Left  % deficit   Quadriceps at 60 degrees: 77.1# 49.4# 35.9%   Hamstrings at 60 de.2 # 39.5# 14.5%           Palpation: left medial joint line tenderness              TREATMENT      Total Treatment time  separate from Evaluation: 10 minutes     PT dx: possible left medial meniscus pathology and right hip impingement     Maria L participated in dynamic functional therapeutic activities to improve functional performance for 10 minutes, including:  Gym routine modification (explanation of exercise to avoid and reasoning)       Home Exercises and Patient Education Provided    Education provided:   - Role of PT, PT POC    Written Home Exercises Provided: nv    Assessment   Maria L is a 53 y.o. female referred to outpatient Physical Therapy with a medical diagnosis of Patellofemoral pain syndrome of both knees . Physical exam is consistent with possible PT dx of  left medial meniscus pathology and right hip impingement. Primary impairments include AROM, PROM, joint mobility, strength, balance, soft tissue restrictions, and pain which limits functional mobility. This pt is a good candidate for skilled PT tx and stands to benefit from a combination of manual therapy including joint mobilizations with trigger point/myofacscial release, therapeutic exercise to establish core/joint stability, neuromuscular re-education, dry needling and modalities Prn. The pt has been educated on their dx/POC and consents to further PT tx.      Pt prognosis is Good.   Pt will benefit from skilled outpatient Physical Therapy to address the deficits stated above and in the chart below, provide pt/family education, and to maximize pt's level of independence.     Plan of care discussed with patient: Yes  Pt's spiritual, cultural and educational needs considered and patient is agreeable to the plan of care and goals as stated below:     Anticipated Barriers for therapy: none      Medical Necessity is demonstrated by the following  History  Co-morbidities and personal factors that may impact the plan of care [x] LOW: no personal factors / co-morbidities  [] MODERATE: 1-2 personal factors / co-morbidities  [] HIGH: 3+ personal factors /  co-morbidities    Moderate / High Support Documentation:   Co-morbidities affecting plan of care: n/a    Personal Factors:   no deficits     Examination  Body Structures and Functions, activity limitations and participation restrictions that may impact the plan of care [x] LOW: addressing 1-2 elements  [] MODERATE: 3+ elements  [] HIGH: 4+ elements (please support below)    Moderate / High Support Documentation: n/a     Clinical Presentation [x] LOW: stable  [] MODERATE: Evolving  [] HIGH: Unstable     Decision Making/ Complexity Score: low       Goals:  Short-Term Goals: 4 weeks  - The patient will be independent with initial home exercise program.  - The patient will increase strength in bilateral glutes to at least 4+/5 to perform functional mobility.  - The patient will increase knee ROM grossly to symmetrical to non-involved LE.    Long-Term Goals: 8 weeks  - The patient will be independent with home exercise program and symptom management.  - The patient will increase strength to at least 5/5 to perform functional mobility.  - The patient will tolerate normal gym routine 3x/week with less than       PLAN   Plan of care Certification: 5/15/2024 to 7/15/2024.    Outpatient Physical Therapy 1-2 times weekly for 8 weeks to include the following interventions: Cervical/Lumbar Traction, Electrical Stimulation prn, Manual Therapy, Moist Heat/ Ice, Neuromuscular Re-ed, Patient Education, Self Care, Therapeutic Activities, Therapeutic Exercise, and Dry Needling .     David Yarbrough, PT, DPT      I CERTIFY THE NEED FOR THESE SERVICES FURNISHED UNDER THIS PLAN OF TREATMENT AND WHILE UNDER MY CARE   Physician's comments:     Physician's Signature: ___________________________________________________

## 2024-05-16 PROBLEM — R29.898 WEAKNESS OF BOTH HIPS: Status: ACTIVE | Noted: 2024-05-16

## 2024-05-17 ENCOUNTER — LAB VISIT (OUTPATIENT)
Dept: LAB | Facility: HOSPITAL | Age: 54
End: 2024-05-17
Attending: FAMILY MEDICINE
Payer: COMMERCIAL

## 2024-05-17 DIAGNOSIS — R53.83 FATIGUE: ICD-10-CM

## 2024-05-17 DIAGNOSIS — Z79.899 ENCOUNTER FOR LONG-TERM (CURRENT) USE OF OTHER MEDICATIONS: ICD-10-CM

## 2024-05-17 DIAGNOSIS — Z00.01 ANNUAL VISIT FOR GENERAL ADULT MEDICAL EXAMINATION WITH ABNORMAL FINDINGS: ICD-10-CM

## 2024-05-17 DIAGNOSIS — E78.49 OTHER HYPERLIPIDEMIA: ICD-10-CM

## 2024-05-17 DIAGNOSIS — E34.9 ENDOCRINE DISORDER: Primary | ICD-10-CM

## 2024-05-17 LAB
ALBUMIN SERPL BCP-MCNC: 4.1 G/DL (ref 3.5–5.2)
ALP SERPL-CCNC: 56 U/L (ref 55–135)
ALT SERPL W/O P-5'-P-CCNC: 34 U/L (ref 10–44)
ANION GAP SERPL CALC-SCNC: 10 MMOL/L (ref 8–16)
AST SERPL-CCNC: 29 U/L (ref 10–40)
BASOPHILS # BLD AUTO: 0.05 K/UL (ref 0–0.2)
BASOPHILS NFR BLD: 1.2 % (ref 0–1.9)
BILIRUB SERPL-MCNC: 0.5 MG/DL (ref 0.1–1)
BUN SERPL-MCNC: 15 MG/DL (ref 6–20)
CALCIUM SERPL-MCNC: 9.5 MG/DL (ref 8.7–10.5)
CHLORIDE SERPL-SCNC: 109 MMOL/L (ref 95–110)
CHOLEST SERPL-MCNC: 279 MG/DL (ref 120–199)
CHOLEST/HDLC SERPL: 3.4 {RATIO} (ref 2–5)
CO2 SERPL-SCNC: 21 MMOL/L (ref 23–29)
CREAT SERPL-MCNC: 0.7 MG/DL (ref 0.5–1.4)
CRP SERPL-MCNC: 1.02 MG/L (ref 0–3.19)
DIFFERENTIAL METHOD BLD: NORMAL
EOSINOPHIL # BLD AUTO: 0.3 K/UL (ref 0–0.5)
EOSINOPHIL NFR BLD: 7.7 % (ref 0–8)
ERYTHROCYTE [DISTWIDTH] IN BLOOD BY AUTOMATED COUNT: 13.6 % (ref 11.5–14.5)
EST. GFR  (NO RACE VARIABLE): >60 ML/MIN/1.73 M^2
GLUCOSE SERPL-MCNC: 94 MG/DL (ref 70–110)
HCT VFR BLD AUTO: 45.7 % (ref 37–48.5)
HDLC SERPL-MCNC: 82 MG/DL (ref 40–75)
HDLC SERPL: 29.4 % (ref 20–50)
HGB BLD-MCNC: 14.8 G/DL (ref 12–16)
IMM GRANULOCYTES # BLD AUTO: 0.01 K/UL (ref 0–0.04)
IMM GRANULOCYTES NFR BLD AUTO: 0.2 % (ref 0–0.5)
LDLC SERPL CALC-MCNC: 178.2 MG/DL (ref 63–159)
LYMPHOCYTES # BLD AUTO: 1.8 K/UL (ref 1–4.8)
LYMPHOCYTES NFR BLD: 41.3 % (ref 18–48)
MCH RBC QN AUTO: 30.6 PG (ref 27–31)
MCHC RBC AUTO-ENTMCNC: 32.4 G/DL (ref 32–36)
MCV RBC AUTO: 95 FL (ref 82–98)
MONOCYTES # BLD AUTO: 0.4 K/UL (ref 0.3–1)
MONOCYTES NFR BLD: 8.2 % (ref 4–15)
NEUTROPHILS # BLD AUTO: 1.8 K/UL (ref 1.8–7.7)
NEUTROPHILS NFR BLD: 41.4 % (ref 38–73)
NONHDLC SERPL-MCNC: 197 MG/DL
NRBC BLD-RTO: 0 /100 WBC
PLATELET # BLD AUTO: 344 K/UL (ref 150–450)
PMV BLD AUTO: 9.4 FL (ref 9.2–12.9)
POTASSIUM SERPL-SCNC: 4.1 MMOL/L (ref 3.5–5.1)
PROT SERPL-MCNC: 7.2 G/DL (ref 6–8.4)
RBC # BLD AUTO: 4.83 M/UL (ref 4–5.4)
SODIUM SERPL-SCNC: 140 MMOL/L (ref 136–145)
T3FREE SERPL-MCNC: 2.8 PG/ML (ref 2.3–4.2)
T4 FREE SERPL-MCNC: 0.94 NG/DL (ref 0.71–1.51)
TESTOST SERPL-MCNC: 108 NG/DL (ref 5–73)
TRIGL SERPL-MCNC: 94 MG/DL (ref 30–150)
TSH SERPL DL<=0.005 MIU/L-ACNC: 1.54 UIU/ML (ref 0.4–4)
WBC # BLD AUTO: 4.29 K/UL (ref 3.9–12.7)

## 2024-05-17 PROCEDURE — 84443 ASSAY THYROID STIM HORMONE: CPT | Performed by: NURSE PRACTITIONER

## 2024-05-17 PROCEDURE — 36415 COLL VENOUS BLD VENIPUNCTURE: CPT | Mod: PO | Performed by: FAMILY MEDICINE

## 2024-05-17 PROCEDURE — 85025 COMPLETE CBC W/AUTO DIFF WBC: CPT | Performed by: FAMILY MEDICINE

## 2024-05-17 PROCEDURE — 86141 C-REACTIVE PROTEIN HS: CPT | Performed by: FAMILY MEDICINE

## 2024-05-17 PROCEDURE — 82172 ASSAY OF APOLIPOPROTEIN: CPT | Mod: 91 | Performed by: FAMILY MEDICINE

## 2024-05-17 PROCEDURE — 84403 ASSAY OF TOTAL TESTOSTERONE: CPT | Performed by: NURSE PRACTITIONER

## 2024-05-17 PROCEDURE — 84402 ASSAY OF FREE TESTOSTERONE: CPT | Performed by: NURSE PRACTITIONER

## 2024-05-17 PROCEDURE — 84439 ASSAY OF FREE THYROXINE: CPT | Performed by: NURSE PRACTITIONER

## 2024-05-17 PROCEDURE — 84481 FREE ASSAY (FT-3): CPT | Performed by: NURSE PRACTITIONER

## 2024-05-17 PROCEDURE — 80061 LIPID PANEL: CPT | Performed by: FAMILY MEDICINE

## 2024-05-17 PROCEDURE — 80053 COMPREHEN METABOLIC PANEL: CPT | Performed by: FAMILY MEDICINE

## 2024-05-20 ENCOUNTER — OFFICE VISIT (OUTPATIENT)
Dept: OPTOMETRY | Facility: CLINIC | Age: 54
End: 2024-05-20
Payer: COMMERCIAL

## 2024-05-20 ENCOUNTER — CLINICAL SUPPORT (OUTPATIENT)
Dept: REHABILITATION | Facility: HOSPITAL | Age: 54
End: 2024-05-20
Payer: COMMERCIAL

## 2024-05-20 DIAGNOSIS — Z46.0 CONTACT LENS/GLASSES FITTING: Primary | ICD-10-CM

## 2024-05-20 DIAGNOSIS — R29.898 WEAKNESS OF BOTH HIPS: Primary | ICD-10-CM

## 2024-05-20 DIAGNOSIS — H52.13 MYOPIA WITH ASTIGMATISM AND PRESBYOPIA, BILATERAL: ICD-10-CM

## 2024-05-20 DIAGNOSIS — H52.4 MYOPIA WITH ASTIGMATISM AND PRESBYOPIA, BILATERAL: ICD-10-CM

## 2024-05-20 DIAGNOSIS — H52.203 MYOPIA WITH ASTIGMATISM AND PRESBYOPIA, BILATERAL: ICD-10-CM

## 2024-05-20 DIAGNOSIS — Z46.0 CONTACT LENS/GLASSES FITTING: ICD-10-CM

## 2024-05-20 DIAGNOSIS — Z01.00 EXAMINATION OF EYES AND VISION: Primary | ICD-10-CM

## 2024-05-20 LAB — TESTOST FREE SERPL-MCNC: 2.4 PG/ML

## 2024-05-20 PROCEDURE — 92310 CONTACT LENS FITTING OU: CPT | Mod: CSM,S$GLB,, | Performed by: OPTOMETRIST

## 2024-05-20 PROCEDURE — 92014 COMPRE OPH EXAM EST PT 1/>: CPT | Mod: S$GLB,,, | Performed by: OPTOMETRIST

## 2024-05-20 PROCEDURE — 99999 PR PBB SHADOW E&M-EST. PATIENT-LVL III: CPT | Mod: PBBFAC,,, | Performed by: OPTOMETRIST

## 2024-05-20 PROCEDURE — 97530 THERAPEUTIC ACTIVITIES: CPT | Mod: PO

## 2024-05-20 PROCEDURE — 97112 NEUROMUSCULAR REEDUCATION: CPT | Mod: PO

## 2024-05-20 PROCEDURE — 97110 THERAPEUTIC EXERCISES: CPT | Mod: PO

## 2024-05-20 PROCEDURE — 99499 UNLISTED E&M SERVICE: CPT | Mod: ,,, | Performed by: OPTOMETRIST

## 2024-05-20 PROCEDURE — 92015 DETERMINE REFRACTIVE STATE: CPT | Mod: S$GLB,,, | Performed by: OPTOMETRIST

## 2024-05-20 NOTE — PATIENT INSTRUCTIONS
"DRY EYES -- BURNING OR BERRY SYMPTOMS:  Use Over The Counter artificial tears as needed for dry eye symptoms.   Some common brands include:  Systane, Optive, Refresh, and Thera-Tears.  These drops can be used as frequently as desired, but may be most helpful use during long periods of concentrated work.  For example, reading / working at the computer. Start with 3-4x per day.     Nighttime Ophthalmic gel or ointments are available: Refresh PM, Genteal, and Lacrilube.    Avoid drops that "get redness out" (Visine, Murine, Clear Eyes), as these may contain medication that could further irritate the eyes, especially with chronic use.    ALLERGY EYES -- ITCHING SYMPTOMS:  Over the counter medications include--Pataday, Zaditor, and Alaway.  Use as directed 1-2 drops daily for symptoms of itching / watering eyes.  These drops will not help for dry eye or exposure symptoms.    REDNESS RELIEF:  Lumify---is a good redness reliever that will not cause irritation if used chronically.        FLASHES / FLOATERS / POSTERIOR VITREOUS DETACHMENT    Call the clinic if you have any further changes in symptoms.  Including:  Increased numbers of floaters or flashing lights, dimness or darkness that moves through or stays constant in your vision, or any pain in the eye (s).    You may sometimes see small specks or clouds moving in your field of vision.  They are called FLOATERS.  You can often see them when looking at a plain background, like a blank wall or blue jamie.  Floaters are actually tiny clumps of gel or cells inside the VITREOUS, the clear jelly-like fluid that fills the inside of your eye.    While these objects look like they are in front of your eye, they are actually floating inside.  What you see are the shadows they cast on the RETINA, the nerve layer at the back of the eye that senses light and allows you to see.      POSTERIOR VITREOUS DETACHMENT    The appearance of new floaters may be alarming.  If you suddenly " develop new floaters, you should contact your eye care professional  right away.    The retina can tear if the shrinking vitreous pulls away from the wall of the eye.  This sometimes causes a small amount of bleeding in the eye that may appear as new floaters.    A torn retina is always a serious problem, since it can lead to a retinal detachment.  You should see your eye care professional as soon as possible if:    even one new floater appears suddenly;  you see sudden flashes of light;  you notice other symptoms, like the loss of side vision, or a curtain closes down in your vision        POSTERIOR VITREOUS DETACHMENT is more common for people who:    are nearsighted;  have had cataract surgery;  have had YAG laser surgery of the eye;  have had inflammation inside the eye;  are over age 60.      While some floaters may remain visible, many of them will fade over time and become less noticeable.  Even if you've had some floaters for years, you should have your eyes checked as soon as possible if you notice new ones.    FLASHING LIGHTS    When the vitreous gel rubs or pulls on the retina, you may see what look like flashing lights or lightning streaks.  These flashes can appear off and on for several weeks or months.      Some people experience flashes of light that appear as jagged lines or heat waves in both eyes, lasting 10-20 minutes.  These flashes are caused by a spasm of blood vessels in the brain, which is called a migraine.    If a headache follows these flashes, it's called a migraine headache.  If   no headache occurs, these flashes are called Ophthalmic or Ocular Migraine.           DAILY WEAR CONTACT LENSES  Continue with Daily Wear of contact lenses, up to all waking hours.  Continue with approved contact lens disinfection / rewetting drops as discussed.  Dispose of lenses monthly.  Stop wearing your lenses and call our office if redness, blurred vision, or pain persists more than 12 hours.  We  recommend an annual eye exam for contact lens patients.

## 2024-05-20 NOTE — PROGRESS NOTES
HPI    Pt. here For new cl's rx. DLE - 12/20/2022    Needs a refill and maybe a newer rx. Does not want to be dilated as she   has physical therapy after this visit. [facility]   [facility]. Currently wearing acuve cl's. Denies F/F/pain or drops used.  Last edited by Jorge Luis Kamara MA on 5/20/2024  2:24 PM.            Assessment /Plan     For exam results, see Encounter Report.    Examination of eyes and vision    Myopia with astigmatism and presbyopia, bilateral    Contact lens/glasses fitting      Ocular health exam [facility] --defers DFE ---OPTOS 5/2024 --normal findings   Updated specs rx gave copy, discussed options // fill prn --ok continue with current   Updated clrx, gave copy and trials if needed, continue wear as previous     DAILY WEAR CONTACT LENSES  Continue with Daily Wear of contact lenses, up to all waking hours.  Continue with approved contact lens disinfection / rewetting drops as discussed.  Dispose of lenses monthly.  Stop wearing your lenses and call our office if redness, blurred vision, or pain persists more than 12 hours.  We recommend an annual eye exam for contact lens patients.    Discussed and educated patient on current findings /plan.  RTC 1 year, prn if any changes / issues

## 2024-05-20 NOTE — PROGRESS NOTES
[facility]   Physical Therapy Treatment Note     Name: Maria L Smyth  Clinic Number: [id number]    Therapy Diagnosis:   Encounter Diagnosis   Name Primary?    Weakness of both hips Yes     Physician: Vidal Lopez MD    Visit Date: 5/20/2024     Physician Orders: PT Eval and Treat   Medical Diagnosis from Referral:  Patellofemoral pain syndrome of both knees   Evaluation Date: 5/15/2024  Authorization Period Expiration: 5/9/2025  Plan of Care Expiration: 7/16/2024  Progress Note Due: 6/16/2024  Visit # / Visits authorized: 1/ 1, 1/20  FOTO: 1/3  PTA Visit #: 0/5       Precautions: Standard     Time In: 1520  Time Out: 1620  Total Billable Time: 60 minutes (1:1 PT/tech)      SUBJECTIVE     Pt reports: no significant changes since eval.  She was compliant with home exercise program.  Response to previous treatment: TBD  Functional change: TBD    Pain: 2/10  Location: left knee      OBJECTIVE     Objective Measures updated at progress report unless specified.     Treatment     Maria L received the treatments listed below:      therapeutic exercises to develop strength, endurance, ROM, and flexibility for 20 minutes including:  Stationary bike lvl 4 8'  Calf stretch slant board 3x30s   Long sit hamstring stretch 3x30s bilateral   Prone quad stretch 3x30s bilateral       neuromuscular re-education activities to improve: Balance, Proprioception, and Posture for 30 minutes. The following activities were included:  Quad set from towel 30x5s   SLR with quad set 2# 3x10 ea   Sagittal plane pelvic retraining 5'   PPT 20x5s   PPT with TA activation 20x5s   Glute bridge RTB abd 3x10  RTB clam 3x10 ea      therapeutic activities to improve functional performance for 10  minutes, including:  STS with RTB and press 0# 3x10  6 inch step 3x8 ea       Patient Education and Home Exercises     Home Exercises Provided and Patient Education Provided     Education provided:   - Plan of Care, HEP, Prognosis      Written Home Exercises Provided: yes. Exercises were reviewed and Maria L was able to demonstrate them prior to the end of the session.  Maria L demonstrated good  understanding of the education provided. See EMR under Patient Instructions for exercises provided during therapy sessions    ASSESSMENT     Physical exam is consistent with possible PT dx of  left medial meniscus pathology and right hip impingement. Good tolerance to today's intervention. Mild medial knee pain noted during functional closed chained intervention that is greatly reduced with LE mechanical cueing.     Maria L Is progressing well towards her goals.   Pt prognosis is Good.     Pt will continue to benefit from skilled outpatient physical therapy to address the deficits listed in the problem list box on initial evaluation, provide pt/family education and to maximize pt's level of independence in the home and community environment.     Pt's spiritual, cultural and educational needs considered and pt agreeable to plan of care and goals.     Anticipated barriers to physical therapy: TBD    Goals:  Short-Term Goals: 4 weeks  - The patient will be independent with initial home exercise program.  - The patient will increase strength in bilateral glutes to at least 4+/5 to perform functional mobility.  - The patient will increase knee ROM grossly to symmetrical to non-involved LE.     Long-Term Goals: 8 weeks  - The patient will be independent with home exercise program and symptom management.  - The patient will increase strength to at least 5/5 to perform functional mobility.  - The patient will tolerate normal gym routine 3x/week with less than        PLAN     Load as tolerated.     David Yarbrough, PT, DPT

## 2024-05-21 LAB
APO A-I SERPL-MCNC: 249 MG/DL
APO B SERPL-MCNC: 117 MG/DL
APOLIPOPROTEIN B/A1 RATIO: 0.5

## 2024-05-21 NOTE — PROGRESS NOTES
Chief Complaint   Patient presents with    Left Knee - Pain       HPI:    This is a 53 y.o. who presents today complaining of left knee pain for 1 weeks after twisting injury. Pain is throbbing. No numbness or tingling. No associated signs or symptoms.      Past Medical History:   Diagnosis Date    Abnormal Pap smear of cervix     Allergic conjunctivitis     ALLERGIC RHINITIS     Atopic dermatitis     Food allergy 9/24/2012    Immunotherapy     Migraine headache       Past Surgical History:   Procedure Laterality Date    SHOULDER ARTHROSCOPY  9/2011    right rotator cuff repair    SINUS SURGERY  1991    TONSILLECTOMY  childhood      Current Outpatient Medications on File Prior to Visit   Medication Sig Dispense Refill    ALTRENO 0.05 % Lotn Apply pea-sized amount to entire face nightly. Start slow, up-titrate as tolerated. 45 g 5    B-complex with vitamin C (Z-BEC OR EQUIV) tablet Take 1 tablet by mouth once daily.      cetirizine (ZYRTEC) 10 MG tablet Take 10 mg by mouth as needed.       EPINEPHrine (EPIPEN) 0.3 mg/0.3 mL AtIn Use as directed. Inject into the muscle as needed for anaphylactic reaction. 2 each 3    fish oil-omega-3 fatty acids 300-1,000 mg capsule Take 2 g by mouth once daily.      progesterone (PROMETRIUM) 200 MG capsule Take 1 capsule (200 mg total) by mouth nightly. 30 capsule 3    rizatriptan (MAXALT) 10 MG tablet Take 1 tablet by mouth daily as needed for migraine / headaches. 30 tablet 3    tretinoin (RETIN-A) 0.025 % cream Apply pea-sized amount to entire face nightly. Start slow, up-titrate as tolerated. 45 g 5    vitamin D (VITAMIN D3) 1000 units Tab Take 3,000 Units by mouth once daily.       No current facility-administered medications on file prior to visit.      Review of patient's allergies indicates:   Allergen Reactions    Macrobid  [nitrofurantoin monohyd/m-cryst] Hives and Anaphylaxis     Other reaction(s): Throat closing    Corn Swelling    Effexor  [venlafaxine]      Other  reaction(s): dysphagia    Eggs [egg derived] Hives    Wheat Swelling    Doxycycline Itching and Rash      Family History not pertinent   Social History     Socioeconomic History    Marital status:    Tobacco Use    Smoking status: Never    Smokeless tobacco: Never   Substance and Sexual Activity    Alcohol use: Yes     Comment: social    Drug use: No    Sexual activity: Yes     Partners: Male     Birth control/protection: None, Partner-Vasectomy   Other Topics Concern    Are you pregnant or think you may be? No    Breast-feeding No   Social History Narrative    Originally from [place].         Review of Systems:   Constitutional:  Denies fever or chills    Eyes:  Denies change in visual acuity    HENT:  Denies nasal congestion or sore throat    Respiratory:  Denies cough or shortness of breath    Cardiovascular:  Denies chest pain or edema    GI:  Denies abdominal pain, nausea, vomiting, bloody stools or diarrhea    :  Denies dysuria    Integument:  Denies rash    Neurologic:  Denies headache, focal weakness or sensory changes    Endocrine:  Denies polyuria or polydipsia    Lymphatic:  Denies swollen glands    Psychiatric:  Denies depression or anxiety       Physical Exam:    Constitutional:  Well developed, well nourished, no acute distress, non-toxic appearance    Integument:  Well hydrated, no rash    Lymphatic:  No lymphadenopathy noted    Neurologic:  Alert & oriented x 3,     Psychiatric:  Speech and behavior appropriate    Gi: abdomen soft  Eyes: EOMI   R knee  Exam performed same as contralateral side and is normal  L knee  FROM. 5/5 quads. Increased patellar excursion and apprehension. Mild patellar grind. Skin intact. Knee stable otherwise. NVI distally.      X-rays were performed today, personally reviewed by me and findings discussed with the patient.   3 views of the left knee show no fractures or dislocations      Patellofemoral pain syndrome of both knees  -     Ambulatory  referral/consult to Physical/Occupational Therapy; Future; Expected date: 05/16/2024    Other orders  -     methocarbamoL (ROBAXIN) 750 MG Tab; Take 1 tablet (750 mg total) by mouth 4 (four) times daily as needed.  Dispense: 44 tablet; Refill: 3        Begin PT for VMO strength. RTC as needed. Robaxin as discussed.

## 2024-05-25 ENCOUNTER — PATIENT MESSAGE (OUTPATIENT)
Dept: OPTOMETRY | Facility: CLINIC | Age: 54
End: 2024-05-25
Payer: COMMERCIAL

## 2024-05-29 ENCOUNTER — TELEPHONE (OUTPATIENT)
Dept: OBSTETRICS AND GYNECOLOGY | Facility: CLINIC | Age: 54
End: 2024-05-29
Payer: COMMERCIAL

## 2024-05-29 ENCOUNTER — CLINICAL SUPPORT (OUTPATIENT)
Dept: REHABILITATION | Facility: HOSPITAL | Age: 54
End: 2024-05-29
Payer: COMMERCIAL

## 2024-05-29 DIAGNOSIS — R29.898 WEAKNESS OF BOTH HIPS: Primary | ICD-10-CM

## 2024-05-29 PROCEDURE — 97530 THERAPEUTIC ACTIVITIES: CPT | Mod: PO

## 2024-05-29 PROCEDURE — 97110 THERAPEUTIC EXERCISES: CPT | Mod: PO

## 2024-05-29 PROCEDURE — 97112 NEUROMUSCULAR REEDUCATION: CPT | Mod: PO

## 2024-05-29 NOTE — PROGRESS NOTES
[facility]   Physical Therapy Treatment Note     Name: Maria L Smyth  Clinic Number: [id number]    Therapy Diagnosis:   Encounter Diagnosis   Name Primary?    Weakness of both hips Yes       Physician: Vidal Lopez MD    Visit Date: 5/29/2024     Physician Orders: PT Eval and Treat   Medical Diagnosis from Referral:  Patellofemoral pain syndrome of both knees   Evaluation Date: 5/15/2024  Authorization Period Expiration: 5/9/2025  Plan of Care Expiration: 7/16/2024  Progress Note Due: 6/16/2024  Visit # / Visits authorized: 1/ 1, 2/20  FOTO: 1/3  PTA Visit #: 0/5       Precautions: Standard     Time In: 1510  Time Out: 1605  Total Billable Time: 55 minutes (1:1 PT/tech)      SUBJECTIVE     Pt reports: no significant changes since eval.  She was compliant with home exercise program.  Response to previous treatment: TBD  Functional change: TBD    Pain: 2/10  Location: left knee      OBJECTIVE     Objective Measures updated at progress report unless specified.     Treatment     Maria L received the treatments listed below:      therapeutic exercises to develop strength, endurance, ROM, and flexibility for 15 minutes including:  Stationary bike lvl 4 8'  Calf stretch slant board 3x30s   Long sit hamstring stretch 3x30s bilateral   Prone quad stretch 3x30s bilateral   Knee extension iso on machine 6x10s     neuromuscular re-education activities to improve: Balance, Proprioception, and Posture for 30 minutes. The following activities were included:  Quad set from towel 30x5s   SLR with quad set 2# 3x10 ea   Sagittal plane pelvic retraining 5'   PPT 20x5s   PPT with TA activation 20x5s   Glute bridge RTB abd 3x10  RTB clam 3x10 ea  90/90 dead bug 3x10  Lateral stepdown 3x8    therapeutic activities to improve functional performance for 10  minutes, including:  STS with RTB and press 0# 3x10  6 inch step 3x8 ea       Patient Education and Home Exercises     Home Exercises Provided and Patient  Education Provided     Education provided:   - Plan of Care, HEP, Prognosis     Written Home Exercises Provided: yes. Exercises were reviewed and Maria L was able to demonstrate them prior to the end of the session.  Maria L demonstrated good  understanding of the education provided. See EMR under Patient Instructions for exercises provided during therapy sessions    ASSESSMENT     2/10 left anterior knee pain abolished with quad isos. Physical exam is consistent with possible PT dx of  left medial meniscus pathology and right hip impingement. Good tolerance to today's intervention. Mild medial knee pain noted during functional closed chained intervention that is greatly reduced with LE mechanical cueing.     Maria L Is progressing well towards her goals.   Pt prognosis is Good.     Pt will continue to benefit from skilled outpatient physical therapy to address the deficits listed in the problem list box on initial evaluation, provide pt/family education and to maximize pt's level of independence in the home and community environment.     Pt's spiritual, cultural and educational needs considered and pt agreeable to plan of care and goals.     Anticipated barriers to physical therapy: TBD    Goals:  Short-Term Goals: 4 weeks  - The patient will be independent with initial home exercise program.  - The patient will increase strength in bilateral glutes to at least 4+/5 to perform functional mobility.  - The patient will increase knee ROM grossly to symmetrical to non-involved LE.     Long-Term Goals: 8 weeks  - The patient will be independent with home exercise program and symptom management.  - The patient will increase strength to at least 5/5 to perform functional mobility.  - The patient will tolerate normal gym routine 3x/week with less than        PLAN     Load as tolerated.     David Yarbrough, PT, DPT

## 2024-05-29 NOTE — TELEPHONE ENCOUNTER
----- Message from Shannen [facility] sent at 5/29/2024  2:57 PM CDT -----  Regarding: Therapeutic Alternative  Good Afternoon! Ms. Smyth states that her Estradiol 0.75mg weekly patch is too large, not adhering well to the skin for the entire 7 days, and is causing a dermatitis due to the adhesive. She inquired about the twice weekly patch but the adhesive is the same. Our [facility] insurance will only cover the Divigel. Do you think that you could send in an alternative to the Estradiol patch? Thank you and please advise.

## 2024-05-31 ENCOUNTER — TELEPHONE (OUTPATIENT)
Dept: OBSTETRICS AND GYNECOLOGY | Facility: CLINIC | Age: 54
End: 2024-05-31
Payer: COMMERCIAL

## 2024-05-31 LAB — NONINV COLON CA DNA+OCC BLD SCRN STL QL: NEGATIVE

## 2024-05-31 NOTE — TELEPHONE ENCOUNTER
----- Message from Twila Villarreal MD sent at 5/31/2024  9:19 AM CDT -----  Regarding: RE: Therapeutic Alternative  Yeah I'd have her discuss her options with me. I'll have my staff book her an apt  ----- Message -----  From: Shannen [facility]  Sent: 5/30/2024   5:39 PM CDT  To: Twila Villarreal MD  Subject: RE: Therapeutic Alternative                      Yes m'am you are correct. You did prescribe the Estradiol patch July, 2023. Should the patient message you to discuss or should she schedule an appointment? Thank you.  ----- Message -----  From: Twila Villarreal MD  Sent: 5/29/2024   4:13 PM CDT  To: Shannen Donald [facility]  Subject: RE: Therapeutic Alternative                      Hi I don't see that I have prescribed her an estrogen patch? I did last summer but she didn't like it and we stopped. Is there another provider prescribing it?  ----- Message -----  From: Shannen Donald [facility]  Sent: 5/29/2024   3:03 PM CDT  To: Kelley William, PharmD; Twila Villarreal MD  Subject: Therapeutic Alternative                          Good Afternoon! Ms. Smyth states that her Estradiol 0.75mg weekly patch is too large, not adhering well to the skin for the entire 7 days, and is causing a dermatitis due to the adhesive. She inquired about the twice weekly patch but the adhesive is the same. [facility] will only [facility] the Divigel. Do you think that you could send in an alternative to the Estradiol patch? Thank you and please advise.

## 2024-06-03 ENCOUNTER — OFFICE VISIT (OUTPATIENT)
Dept: OBSTETRICS AND GYNECOLOGY | Facility: CLINIC | Age: 54
End: 2024-06-03
Payer: COMMERCIAL

## 2024-06-03 DIAGNOSIS — N95.1 MENOPAUSAL SYMPTOMS: Primary | ICD-10-CM

## 2024-06-03 PROCEDURE — 99214 OFFICE O/P EST MOD 30 MIN: CPT | Mod: 95,,, | Performed by: STUDENT IN AN ORGANIZED HEALTH CARE EDUCATION/TRAINING PROGRAM

## 2024-06-03 RX ORDER — ESTRADIOL 0.07 MG/D
1 FILM, EXTENDED RELEASE TRANSDERMAL
Qty: 8 PATCH | Refills: 11 | Status: SHIPPED | OUTPATIENT
Start: 2024-06-03 | End: 2025-06-03

## 2024-06-03 RX ORDER — CONJUGATED ESTROGENS 0.62 MG/G
CREAM VAGINAL
Qty: 30 G | Refills: 12 | Status: SHIPPED | OUTPATIENT
Start: 2024-06-03

## 2024-06-03 NOTE — PROGRESS NOTES
The patient location is: home   The chief complaint leading to consultation is: discuss HRT    Visit type: audiovisual    Face to Face time with patient: 7  10 minutes of total time spent on the encounter, which includes face to face time and non-face to face time preparing to see the patient (eg, review of tests), Obtaining and/or reviewing separately obtained history, Documenting clinical information in the electronic or other health record, Independently interpreting results (not separately reported) and communicating results to the patient/family/caregiver, or Care coordination (not separately reported).         Each patient to whom he or she provides medical services by telemedicine is:  (1) informed of the relationship between the physician and patient and the respective role of any other health care provider with respect to management of the patient; and (2) notified that he or she may decline to receive medical services by telemedicine and may withdraw from such care at any time.    Notes:     History & Physical  Gynecology      SUBJECTIVE:     Chief Complaint: No chief complaint on file.       History of Present Illness:    Here today to discuss HRT symptoms. Got back on estrogen patch and oral progesterone at night. Having issues with patch staying on. Almost has a deramtitis from adhesive. On climara patch.     Review of patient's allergies indicates:   Allergen Reactions    Macrobid  [nitrofurantoin monohyd/m-cryst] Hives and Anaphylaxis     Other reaction(s): Throat closing    Corn Swelling    Effexor  [venlafaxine]      Other reaction(s): dysphagia    Eggs [egg derived] Hives    Wheat Swelling    Doxycycline Itching and Rash       Past Medical History:   Diagnosis Date    Abnormal Pap smear of cervix     Allergic conjunctivitis     ALLERGIC RHINITIS     Atopic dermatitis     Food allergy 9/24/2012    Immunotherapy     Migraine headache      Past Surgical History:   Procedure Laterality Date    SHOULDER  ARTHROSCOPY  2011    right rotator cuff repair    SINUS SURGERY      TONSILLECTOMY  childhood     OB History          3    Para   3    Term   3            AB        Living   3         SAB        IAB        Ectopic        Multiple        Live Births   3               Family History   Problem Relation Name Age of Onset    Heart disease Mother  60        s/p cabg    Retinitis pigmentosa Mother      Breast cancer Mother  71    Heart disease Father  62        s/p cabg    Cancer Father          bladder ca (+tob)    Diabetes Father          pre-diabetes    Cancer Maternal Aunt  40        adenoca lung, breast (Non-smoker)    Diabetes Maternal Aunt      Cancer Maternal Grandmother      Cancer Maternal Grandfather          NHL    Allergic rhinitis Son 8 yo     Allergic rhinitis Son 6yo     Asthma Son 6yo     Atopy Son 6yo     Eczema Son 6yo     Immunodeficiency Neg Hx      Urticaria Neg Hx      Angioedema Neg Hx      Allergies Neg Hx      Rhinitis Neg Hx      Ovarian cancer Neg Hx      Colon cancer Neg Hx      Melanoma Neg Hx       Social History     Tobacco Use    Smoking status: Never    Smokeless tobacco: Never   Substance Use Topics    Alcohol use: Yes     Comment: social    Drug use: No       Current Outpatient Medications   Medication Sig    ALTRENO 0.05 % Lotn Apply pea-sized amount to entire face nightly. Start slow, up-titrate as tolerated.    B-complex with vitamin C (Z-BEC OR EQUIV) tablet Take 1 tablet by mouth once daily.    cetirizine (ZYRTEC) 10 MG tablet Take 10 mg by mouth as needed.     conjugated estrogens (PREMARIN) vaginal cream Place a pea-sized amount in vagina every night for 2 weeks, then use 2-3 nights a week    EPINEPHrine (EPIPEN) 0.3 mg/0.3 mL AtIn Use as directed. Inject into the muscle as needed for anaphylactic reaction.    estradioL (VIVELLE-DOT) 0.075 mg/24 hr Place 1 patch onto the skin twice a week.    fish oil-omega-3 fatty acids 300-1,000 mg capsule Take 2 g by mouth  once daily.    methocarbamoL (ROBAXIN) 750 MG Tab Take 1 tablet (750 mg total) by mouth 4 (four) times daily as needed.    progesterone (PROMETRIUM) 200 MG capsule Take 1 capsule (200 mg total) by mouth nightly.    rizatriptan (MAXALT) 10 MG tablet Take 1 tablet by mouth daily as needed for migraine / headaches.    tretinoin (RETIN-A) 0.025 % cream Apply pea-sized amount to entire face nightly. Start slow, up-titrate as tolerated.    vitamin D (VITAMIN D3) 1000 units Tab Take 3,000 Units by mouth once daily.     No current facility-administered medications for this visit.         Review of Systems:  Review of Systems   Constitutional:  Negative for chills, fatigue and fever.   HENT:  Negative for congestion.    Eyes:  Negative for visual disturbance.   Respiratory:  Negative for cough and shortness of breath.    Cardiovascular:  Negative for chest pain and palpitations.   Gastrointestinal:  Negative for abdominal distention, abdominal pain, constipation, diarrhea, nausea and vomiting.   Genitourinary:  Negative for difficulty urinating, dysuria, hematuria, vaginal bleeding and vaginal discharge.   Skin:  Negative for rash.   Neurological:  Negative for dizziness, seizures, light-headedness and headaches.   Hematological:  Does not bruise/bleed easily.   Psychiatric/Behavioral:  Negative for dysphoric mood. The patient is not nervous/anxious.         OBJECTIVE:     Physical Exam:  Physical Exam  Vitals reviewed.   Constitutional:       General: She is not in acute distress.     Appearance: Normal appearance. She is well-developed.   HENT:      Head: Normocephalic and atraumatic.   Cardiovascular:      Rate and Rhythm: Normal rate and regular rhythm.   Pulmonary:      Effort: Pulmonary effort is normal.   Abdominal:      General: There is no distension.      Palpations: Abdomen is soft.   Genitourinary:     Vagina: Normal.   Skin:     General: Skin is warm.   Neurological:      Mental Status: She is alert and oriented  to person, place, and time.   Psychiatric:         Behavior: Behavior normal.         Thought Content: Thought content normal.         Judgment: Judgment normal.           ASSESSMENT:       ICD-10-CM ICD-9-CM    1. Menopausal symptoms  N95.1 627.2 estradioL (VIVELLE-DOT) 0.075 mg/24 hr      conjugated estrogens (PREMARIN) vaginal cream          No orders of the defined types were placed in this encounter.          Plan:      - will try vivelle dot patch, if no improvement will try divigel vs oral estrogen  -continue po Prometrium at night  - requests vaginal estrogen cream- sent in     Twila Villarreal M.D.  Obstetrics and Gynecology

## 2024-06-04 ENCOUNTER — CLINICAL SUPPORT (OUTPATIENT)
Dept: REHABILITATION | Facility: HOSPITAL | Age: 54
End: 2024-06-04
Payer: COMMERCIAL

## 2024-06-04 DIAGNOSIS — R29.898 WEAKNESS OF BOTH HIPS: Primary | ICD-10-CM

## 2024-06-04 PROCEDURE — 97140 MANUAL THERAPY 1/> REGIONS: CPT | Mod: PO

## 2024-06-04 PROCEDURE — 97530 THERAPEUTIC ACTIVITIES: CPT | Mod: PO

## 2024-06-04 PROCEDURE — 97112 NEUROMUSCULAR REEDUCATION: CPT | Mod: PO

## 2024-06-04 PROCEDURE — 97110 THERAPEUTIC EXERCISES: CPT | Mod: PO

## 2024-06-04 NOTE — PROGRESS NOTES
[facility]   Physical Therapy Treatment Note     Name: Maria L Smyth  Clinic Number: [id number]    Therapy Diagnosis:   Encounter Diagnosis   Name Primary?    Weakness of both hips Yes       Physician: Vidal Lopez MD    Visit Date: 6/4/2024     Physician Orders: PT Eval and Treat   Medical Diagnosis from Referral:  Patellofemoral pain syndrome of both knees   Evaluation Date: 5/15/2024  Authorization Period Expiration: 5/9/2025  Plan of Care Expiration: 7/16/2024  Progress Note Due: 6/16/2024  Visit # / Visits authorized: 1/ 1, 2/20  FOTO: 1/3  PTA Visit #: 0/5       Precautions: Standard     Time In: 1500  Time Out: 1600  Total Billable Time: 60 minutes (1:1 PT/tech)      SUBJECTIVE     Pt reports: s/sx that she came in for are much less.  She was compliant with home exercise program.  Response to previous treatment: TBD  Functional change: TBD    Pain: 2/10  Location: left knee      OBJECTIVE     Objective Measures updated at progress report unless specified.     Treatment     Maria L received the treatments listed below:      Manual therapy for 10 minutes:  Right hip mob with belt and retest     therapeutic exercises to develop strength, endurance, ROM, and flexibility for 15 minutes including:  Stationary bike lvl 4 8'  Supine hip flexor stretch 3x30s  Knee extension machine 4x8     neuromuscular re-education activities to improve: Balance, Proprioception, and Posture for 25 minutes. The following activities were included:  Quad set from towel 30x5s   SLR with quad set 2# 3x10 ea   Sagittal plane pelvic retraining 5'   PPT 20x5s   PPT with TA activation 20x5s   Glute bridge RTB abd 3x10  Side plank RTB clam 3x10 ea  90/90 dead bug 3x10  Lateral stepdown 3x8    therapeutic activities to improve functional performance for 10  minutes, including:  STS with RTB and press 0# 3x10  6 inch step 3x8 ea       Patient Education and Home Exercises     Home Exercises Provided and Patient  Education Provided     Education provided:   - Plan of Care, HEP, Prognosis     Written Home Exercises Provided: yes. Exercises were reviewed and Maria L was able to demonstrate them prior to the end of the session.  Maria L demonstrated good  understanding of the education provided. See EMR under Patient Instructions for exercises provided during therapy sessions    ASSESSMENT     Pain-free squat rom improved following belt mobs.  Physical exam is consistent with possible PT dx of  left medial meniscus pathology and right hip impingement. Good tolerance to today's intervention. Mild medial knee pain noted during functional closed chained intervention that is greatly reduced with LE mechanical cueing.     Maria L Is progressing well towards her goals.   Pt prognosis is Good.     Pt will continue to benefit from skilled outpatient physical therapy to address the deficits listed in the problem list box on initial evaluation, provide pt/family education and to maximize pt's level of independence in the home and community environment.     Pt's spiritual, cultural and educational needs considered and pt agreeable to plan of care and goals.     Anticipated barriers to physical therapy: TBD    Goals:  Short-Term Goals: 4 weeks  - The patient will be independent with initial home exercise program.  - The patient will increase strength in bilateral glutes to at least 4+/5 to perform functional mobility.  - The patient will increase knee ROM grossly to symmetrical to non-involved LE.     Long-Term Goals: 8 weeks  - The patient will be independent with home exercise program and symptom management.  - The patient will increase strength to at least 5/5 to perform functional mobility.  - The patient will tolerate normal gym routine 3x/week with less than        PLAN     Load as tolerated.     David Yarbrough, PT, DPT

## 2024-06-10 ENCOUNTER — HOSPITAL ENCOUNTER (OUTPATIENT)
Dept: RADIOLOGY | Facility: HOSPITAL | Age: 54
Discharge: HOME | End: 2024-06-10
Attending: STUDENT IN AN ORGANIZED HEALTH CARE EDUCATION/TRAINING PROGRAM
Payer: COMMERCIAL

## 2024-06-10 PROCEDURE — 76856 US EXAM PELVIC COMPLETE: CPT | Mod: TC,PN

## 2024-06-10 PROCEDURE — 76830 TRANSVAGINAL US NON-OB: CPT | Mod: 26,,, | Performed by: RADIOLOGY

## 2024-06-10 PROCEDURE — 76830 TRANSVAGINAL US NON-OB: CPT | Mod: TC,PN

## 2024-06-10 PROCEDURE — 76856 US EXAM PELVIC COMPLETE: CPT | Mod: 26,,, | Performed by: RADIOLOGY

## 2024-06-11 ENCOUNTER — TELEPHONE (OUTPATIENT)
Dept: OBSTETRICS AND GYNECOLOGY | Facility: CLINIC | Age: 54
End: 2024-06-11
Payer: COMMERCIAL

## 2024-06-11 DIAGNOSIS — N94.6 DYSMENORRHEA: Primary | ICD-10-CM

## 2024-06-11 NOTE — TELEPHONE ENCOUNTER
Called patient. Discussed US findings. Will get US in 3 months, patient maybe considering hysterectomy.     Twila Villarreal M.D.  Obstetrics and Gynecology

## 2024-06-14 ENCOUNTER — CLINICAL SUPPORT (OUTPATIENT)
Dept: REHABILITATION | Facility: HOSPITAL | Age: 54
End: 2024-06-14
Payer: COMMERCIAL

## 2024-06-14 DIAGNOSIS — R29.898 WEAKNESS OF BOTH HIPS: Primary | ICD-10-CM

## 2024-06-14 PROCEDURE — 97110 THERAPEUTIC EXERCISES: CPT | Mod: PO

## 2024-06-14 PROCEDURE — 97140 MANUAL THERAPY 1/> REGIONS: CPT | Mod: PO

## 2024-06-14 PROCEDURE — 97530 THERAPEUTIC ACTIVITIES: CPT | Mod: PO

## 2024-06-14 PROCEDURE — 97112 NEUROMUSCULAR REEDUCATION: CPT | Mod: PO

## 2024-06-14 NOTE — PROGRESS NOTES
[facility]   Physical Therapy Treatment Note     Name: Maria L Smyth  Clinic Number: [id number]    Therapy Diagnosis:   Encounter Diagnosis   Name Primary?    Weakness of both hips Yes         Physician: Vidal Lopez MD    Visit Date: 6/14/2024     Physician Orders: PT Eval and Treat   Medical Diagnosis from Referral:  Patellofemoral pain syndrome of both knees   Evaluation Date: 5/15/2024  Authorization Period Expiration: 5/9/2025  Plan of Care Expiration: 7/16/2024  Progress Note Due: 6/16/2024  Visit # / Visits authorized: 1/ 1, 2/20  FOTO: 1/3  PTA Visit #: 0/5       Precautions: Standard     Time In: 0910  Time Out: 1010  Total Billable Time: 60 minutes (1:1 PT/tech)      SUBJECTIVE     Pt reports: she can tell that her pain is worse when she doesn't exercise.  She was compliant with home exercise program.  Response to previous treatment: TBD  Functional change: TBD    Pain: 2/10  Location: left knee      OBJECTIVE     Objective Measures updated at progress report unless specified.     Treatment     Maria L received the treatments listed below:      Manual therapy for 10 minutes:  Right hip mob with belt and retest     therapeutic exercises to develop strength, endurance, ROM, and flexibility for 8 minutes including:  Stationary bike lvl 4 8'    neuromuscular re-education activities to improve: Balance, Proprioception, and Posture for 20 minutes. The following activities were included:  Quad set from towel 30x5s   SLR with quad set 2# 3x10 ea   Sagittal plane pelvic retraining 5'   PPT 20x5s   PPT with TA activation 20x5s   Hip thrust GTB abd 3x10  Side plank RTB clam 3x10 ea  90/90 dead bug 3x10  Lateral stepdown 3x8    therapeutic activities to improve functional performance for 22  minutes, including:  STS with RTB and press 0# 3x10  6 inch step 3x8 ea   Squats, lunges, and RDL for pump class     Patient Education and Home Exercises     Home Exercises Provided and Patient  Education Provided     Education provided:   - Plan of Care, HEP, Prognosis     Written Home Exercises Provided: yes. Exercises were reviewed and Maria L was able to demonstrate them prior to the end of the session.  Maria L demonstrated good  understanding of the education provided. See EMR under Patient Instructions for exercises provided during therapy sessions    ASSESSMENT     Minor pain noted with intervention specific to workout class but pain abolished with PT cueing and modifications. F/u in 3 weeks to assess tolerance to reinitiating class setting workouts. Physical exam is consistent with possible PT dx of  left medial meniscus pathology and right hip impingement.     Maria L Is progressing well towards her goals.   Pt prognosis is Good.     Pt will continue to benefit from skilled outpatient physical therapy to address the deficits listed in the problem list box on initial evaluation, provide pt/family education and to maximize pt's level of independence in the home and community environment.     Pt's spiritual, cultural and educational needs considered and pt agreeable to plan of care and goals.     Anticipated barriers to physical therapy: TBD    Goals:  Short-Term Goals: 4 weeks  - The patient will be independent with initial home exercise program.  - The patient will increase strength in bilateral glutes to at least 4+/5 to perform functional mobility.  - The patient will increase knee ROM grossly to symmetrical to non-involved LE.     Long-Term Goals: 8 weeks  - The patient will be independent with home exercise program and symptom management.  - The patient will increase strength to at least 5/5 to perform functional mobility.  - The patient will tolerate normal gym routine 3x/week with less than        PLAN     Load as tolerated.     David Yarbrough, PT, DPT

## 2024-07-07 ENCOUNTER — PATIENT MESSAGE (OUTPATIENT)
Dept: OBSTETRICS AND GYNECOLOGY | Facility: CLINIC | Age: 54
End: 2024-07-07
Payer: COMMERCIAL

## 2024-07-08 ENCOUNTER — TELEPHONE (OUTPATIENT)
Dept: OBSTETRICS AND GYNECOLOGY | Facility: CLINIC | Age: 54
End: 2024-07-08
Payer: COMMERCIAL

## 2024-07-08 NOTE — TELEPHONE ENCOUNTER
Called patient. Recommend embx for new irregular menstrual bleeding @ 53. Likely 2/2 to starting HRT however does have fibroids and ovarian cyst. Agrees to embx. Leaning towards hyst. Declines IUD placement at time of hysterectomy.     ----- Message from Peggy Thomas LPN sent at 7/8/2024 11:05 AM CDT -----  Wants a phone call later today to further discuss.  ----- Message -----  From: Twila Villarreal MD  Sent: 7/8/2024   9:27 AM CDT  To: Phil SUTHERLAND Staff    Based off of her patient portal message, need to schedule embx. If she has further questions I'll call her later this morning

## 2024-07-15 ENCOUNTER — OFFICE VISIT (OUTPATIENT)
Dept: OBSTETRICS AND GYNECOLOGY | Facility: CLINIC | Age: 54
End: 2024-07-15
Payer: COMMERCIAL

## 2024-07-15 VITALS
BODY MASS INDEX: 23.21 KG/M2 | WEIGHT: 126.13 LBS | HEIGHT: 62 IN | SYSTOLIC BLOOD PRESSURE: 112 MMHG | DIASTOLIC BLOOD PRESSURE: 70 MMHG

## 2024-07-15 DIAGNOSIS — N93.9 ABNORMAL UTERINE BLEEDING (AUB): Primary | ICD-10-CM

## 2024-07-15 LAB
B-HCG UR QL: NEGATIVE
CTP QC/QA: YES

## 2024-07-15 PROCEDURE — 99499 UNLISTED E&M SERVICE: CPT | Mod: S$GLB,,, | Performed by: STUDENT IN AN ORGANIZED HEALTH CARE EDUCATION/TRAINING PROGRAM

## 2024-07-15 PROCEDURE — 99999 PR PBB SHADOW E&M-EST. PATIENT-LVL III: CPT | Mod: PBBFAC,,, | Performed by: STUDENT IN AN ORGANIZED HEALTH CARE EDUCATION/TRAINING PROGRAM

## 2024-07-15 PROCEDURE — 88305 TISSUE EXAM BY PATHOLOGIST: CPT | Mod: 26,,, | Performed by: PATHOLOGY

## 2024-07-15 PROCEDURE — 88305 TISSUE EXAM BY PATHOLOGIST: CPT | Performed by: PATHOLOGY

## 2024-07-15 PROCEDURE — 87624 HPV HI-RISK TYP POOLED RSLT: CPT | Performed by: STUDENT IN AN ORGANIZED HEALTH CARE EDUCATION/TRAINING PROGRAM

## 2024-07-15 PROCEDURE — 58100 BIOPSY OF UTERUS LINING: CPT | Mod: S$GLB,,, | Performed by: STUDENT IN AN ORGANIZED HEALTH CARE EDUCATION/TRAINING PROGRAM

## 2024-07-15 PROCEDURE — 81025 URINE PREGNANCY TEST: CPT | Mod: S$GLB,,, | Performed by: STUDENT IN AN ORGANIZED HEALTH CARE EDUCATION/TRAINING PROGRAM

## 2024-07-15 NOTE — PROCEDURES
Endometrial biopsy    Date/Time: 7/15/2024 9:00 AM    Performed by: Twila Villarreal MD  Authorized by: Twila Villarreal MD    Consent:     Consent given by:  Patient    Patient questions answered: yes      Patient agrees, verbalizes understanding, and wants to proceed: yes      Educational handouts given: yes      Instructions and paperwork completed: yes    Indication:     Indications: Menorrhagia    Pre-procedure:     Pre-procedure timeout performed: yes    Procedure:     Uterus sounded: yes      Uterus sound depth (cm):  8    Curettes used:  2    Specimen collected: specimen collected and sent to pathology      Patient tolerated procedure well with no complications: yes

## 2024-07-17 LAB
FINAL PATHOLOGIC DIAGNOSIS: NORMAL
GROSS: NORMAL
Lab: NORMAL

## 2024-07-18 DIAGNOSIS — N93.9 ABNORMAL UTERINE BLEEDING (AUB): Primary | ICD-10-CM

## 2024-07-19 LAB
HPV HR 12 DNA SPEC QL NAA+PROBE: NEGATIVE
HPV16 AG SPEC QL: NEGATIVE
HPV18 DNA SPEC QL NAA+PROBE: NEGATIVE

## 2024-07-23 DIAGNOSIS — N95.1 MENOPAUSAL SYMPTOMS: ICD-10-CM

## 2024-07-23 RX ORDER — PROGESTERONE 200 MG/1
200 CAPSULE ORAL NIGHTLY
Qty: 90 CAPSULE | Refills: 3 | Status: SHIPPED | OUTPATIENT
Start: 2024-07-23

## 2024-07-23 RX ORDER — ESTRADIOL 0.1 MG/G
1 CREAM VAGINAL
Qty: 8 G | Refills: 11 | Status: SHIPPED | OUTPATIENT
Start: 2024-07-25 | End: 2025-07-25

## 2024-07-23 RX ORDER — CONJUGATED ESTROGENS 0.62 MG/G
CREAM VAGINAL
Qty: 30 G | Refills: 12 | Status: CANCELLED | OUTPATIENT
Start: 2024-07-23

## 2024-07-23 NOTE — TELEPHONE ENCOUNTER
Refill Decision Note   Maria L Smyth  is requesting a refill authorization.  Brief Assessment and Rationale for Refill:  Approve     Medication Therapy Plan:         Comments:     Note composed:12:03 PM 07/23/2024

## 2024-07-24 RX ORDER — CONJUGATED ESTROGENS 0.62 MG/G
CREAM VAGINAL
Qty: 30 G | Refills: 12 | Status: SHIPPED | OUTPATIENT
Start: 2024-07-24

## 2024-07-26 ENCOUNTER — PATIENT MESSAGE (OUTPATIENT)
Dept: OBSTETRICS AND GYNECOLOGY | Facility: CLINIC | Age: 54
End: 2024-07-26
Payer: COMMERCIAL

## 2024-08-20 ENCOUNTER — PATIENT MESSAGE (OUTPATIENT)
Dept: OBSTETRICS AND GYNECOLOGY | Facility: CLINIC | Age: 54
End: 2024-08-20
Payer: COMMERCIAL

## 2024-09-25 ENCOUNTER — OFFICE VISIT (OUTPATIENT)
Dept: OBSTETRICS AND GYNECOLOGY | Facility: CLINIC | Age: 54
End: 2024-09-25
Payer: COMMERCIAL

## 2024-09-25 VITALS
WEIGHT: 122.38 LBS | HEIGHT: 62 IN | SYSTOLIC BLOOD PRESSURE: 122 MMHG | BODY MASS INDEX: 22.52 KG/M2 | DIASTOLIC BLOOD PRESSURE: 80 MMHG

## 2024-09-25 DIAGNOSIS — Z01.818 PREOP EXAMINATION: Primary | ICD-10-CM

## 2024-09-25 PROCEDURE — 99999 PR PBB SHADOW E&M-EST. PATIENT-LVL IV: CPT | Mod: PBBFAC,,, | Performed by: STUDENT IN AN ORGANIZED HEALTH CARE EDUCATION/TRAINING PROGRAM

## 2024-09-25 PROCEDURE — 99499 UNLISTED E&M SERVICE: CPT | Mod: S$GLB,,, | Performed by: STUDENT IN AN ORGANIZED HEALTH CARE EDUCATION/TRAINING PROGRAM

## 2024-09-25 RX ORDER — IBUPROFEN 800 MG/1
800 TABLET ORAL EVERY 6 HOURS PRN
Qty: 30 TABLET | Refills: 1 | Status: SHIPPED | OUTPATIENT
Start: 2024-09-25

## 2024-09-25 RX ORDER — FAMOTIDINE 20 MG/1
20 TABLET, FILM COATED ORAL
Status: SHIPPED | OUTPATIENT
Start: 2024-09-25

## 2024-09-25 RX ORDER — OXYCODONE AND ACETAMINOPHEN 5; 325 MG/1; MG/1
1 TABLET ORAL EVERY 4 HOURS PRN
Qty: 14 EACH | Refills: 0 | Status: SHIPPED | OUTPATIENT
Start: 2024-09-25

## 2024-09-25 RX ORDER — SODIUM CHLORIDE 9 MG/ML
INJECTION, SOLUTION INTRAVENOUS CONTINUOUS
OUTPATIENT
Start: 2024-09-25

## 2024-09-25 RX ORDER — ONDANSETRON 4 MG/1
4 TABLET, ORALLY DISINTEGRATING ORAL EVERY 6 HOURS PRN
Qty: 30 TABLET | Refills: 1 | Status: SHIPPED | OUTPATIENT
Start: 2024-09-25

## 2024-09-25 RX ORDER — CEFAZOLIN SODIUM 2 G/50ML
2 SOLUTION INTRAVENOUS
OUTPATIENT
Start: 2024-09-25

## 2024-09-25 NOTE — PROGRESS NOTES
History & Physical  Gynecology      SUBJECTIVE:     Chief Complaint: Pre-op Exam       History of Present Illness:    Here today for preop for Tlh/BSO/cysto for AUB, fibroids and ovarian cyst. On HRT. No changes since I last saw her.     CLINICAL HISTORY:  Dysmenorrhea, unspecified     TECHNIQUE:  Transabdominal sonography of the pelvis was performed, followed by transvaginal sonography to better evaluate the uterus and ovaries.     COMPARISON:  None.     FINDINGS:  The uterus measures 8.1 x 4.9 x 4.7 cm and the endometrium measures 6 mm.  A uterine mass appears to be present of 3.2 by 3.1 x 3.1 cm anterior that is subserosal.  The measurement is larger than on the prior of 10/12/22.  On that exam a measurement of 2.7 x 2.6 x 2.4 cm is noted.  The apparent growth could relate to necrosis and inflammation, the growth however is somewhat concerning given leiomyosarcoma is can appear similar to fibroids.  Please clinically correlate.     The right ovary is thought to be visualized at 2.2 x 1.3 x 1.1 cm and the left ovary is thought to be visualized at 2.7 x 1.9 x 1 7 cm     A unilocular cystic structure 1.9 cm appears to be present without solid components in the left ovary.  Follow-up 1 year for size stability would be suggested.  Left ovary demonstrates arterial blood flow with the right ovary demonstrating at least venous     Impression:     1. Subserosal uterine mass measuring measuring larger than on 10/12/2022, while some of the change may relate to measurement technique actual growth is suspected.  Fibroids can grow and this could relate to growth of a fibroid in this reportedly premenopausal patient, growth from inflammation, necrosis or recent infarction could be considered, if rapid growth is demonstrated there could be a concern for leiomyosarcoma.  Clinical correlation is necessary, at a minimum closer follow-up for size stability is necessary.       Review of patient's allergies indicates:   Allergen  Reactions    Macrobid  [nitrofurantoin monohyd/m-cryst] Hives and Anaphylaxis     Other reaction(s): Throat closing    Corn Swelling    Effexor  [venlafaxine]      Other reaction(s): dysphagia    Eggs [egg derived] Hives    Wheat Swelling    Doxycycline Itching and Rash       Past Medical History:   Diagnosis Date    Abnormal Pap smear of cervix     Allergic conjunctivitis     ALLERGIC RHINITIS     Atopic dermatitis     Food allergy 2012    Immunotherapy     Migraine headache      Past Surgical History:   Procedure Laterality Date    SHOULDER ARTHROSCOPY  2011    right rotator cuff repair    SINUS SURGERY      TONSILLECTOMY  childhood     OB History          3    Para   3    Term   3            AB        Living   3         SAB        IAB        Ectopic        Multiple        Live Births   3               Family History   Problem Relation Name Age of Onset    Heart disease Mother  60        s/p cabg    Retinitis pigmentosa Mother      Breast cancer Mother  71    Heart disease Father  62        s/p cabg    Cancer Father          bladder ca (+tob)    Diabetes Father          pre-diabetes    Cancer Maternal Aunt  40        adenoca lung, breast (Non-smoker)    Diabetes Maternal Aunt      Cancer Maternal Grandmother      Cancer Maternal Grandfather          NHL    Allergic rhinitis Son 6 yo     Allergic rhinitis Son 4yo     Asthma Son 4yo     Atopy Son 4yo     Eczema Son 4yo     Immunodeficiency Neg Hx      Urticaria Neg Hx      Angioedema Neg Hx      Allergies Neg Hx      Rhinitis Neg Hx      Ovarian cancer Neg Hx      Colon cancer Neg Hx      Melanoma Neg Hx       Social History     Tobacco Use    Smoking status: Never    Smokeless tobacco: Never   Substance Use Topics    Alcohol use: Yes     Comment: social    Drug use: No       Current Outpatient Medications   Medication Sig    ALTRENO 0.05 % Lotn Apply pea-sized amount to entire face nightly. Start slow, up-titrate as tolerated.    B-complex  with vitamin C (Z-BEC OR EQUIV) tablet Take 1 tablet by mouth once daily.    cetirizine (ZYRTEC) 10 MG tablet Take 10 mg by mouth as needed.     [START ON 10/2/2024] chlorhexidine (PERIDEX) 0.12 % solution Use as directed 10 mLs in the mouth or throat 2 (two) times daily.    conjugated estrogens (PREMARIN) vaginal cream Place a pea-sized amount in vagina every night for 2 weeks, then use 2-3 nights a week    EPINEPHrine (EPIPEN) 0.3 mg/0.3 mL AtIn Use as directed. Inject into the muscle as needed for anaphylactic reaction.    estradioL (ESTRACE) 0.01 % (0.1 mg/gram) vaginal cream Place 1 g vaginally twice a week.    estradioL (VIVELLE-DOT) 0.075 mg/24 hr Place 1 patch onto the skin twice a week.    fish oil-omega-3 fatty acids 300-1,000 mg capsule Take 2 g by mouth once daily.    ibuprofen (ADVIL,MOTRIN) 800 MG tablet Take 1 tablet (800 mg total) by mouth every 6 (six) hours as needed for Pain.    methocarbamoL (ROBAXIN) 750 MG Tab Take 1 tablet (750 mg total) by mouth 4 (four) times daily as needed.    [START ON 10/2/2024] mupirocin (BACTROBAN) 2 % ointment Apply topically 2 (two) times daily.    ondansetron (ZOFRAN-ODT) 4 MG TbDL Take 1 tablet (4 mg total) by mouth every 6 (six) hours as needed.    oxyCODONE-acetaminophen (PERCOCET) 5-325 mg per tablet Take 1 tablet by mouth every 4 (four) hours as needed for Pain.    progesterone (PROMETRIUM) 200 MG capsule Take 1 capsule (200 mg total) by mouth nightly.    rizatriptan (MAXALT) 10 MG tablet Take 1 tablet by mouth daily as needed for migraine / headaches.    tretinoin (RETIN-A) 0.025 % cream Apply pea-sized amount to entire face nightly. Start slow, up-titrate as tolerated.    vitamin D (VITAMIN D3) 1000 units Tab Take 3,000 Units by mouth once daily.     Current Facility-Administered Medications   Medication    famotidine tablet 20 mg         Review of Systems:  Review of Systems   Constitutional:  Negative for chills, fatigue and fever.   HENT:  Negative for  congestion.    Eyes:  Negative for visual disturbance.   Respiratory:  Negative for cough and shortness of breath.    Cardiovascular:  Negative for chest pain and palpitations.   Gastrointestinal:  Negative for abdominal distention, abdominal pain, constipation, diarrhea, nausea and vomiting.   Genitourinary:  Negative for difficulty urinating, dysuria, hematuria, vaginal bleeding and vaginal discharge.   Skin:  Negative for rash.   Neurological:  Negative for dizziness, seizures, light-headedness and headaches.   Hematological:  Does not bruise/bleed easily.   Psychiatric/Behavioral:  Negative for dysphoric mood. The patient is not nervous/anxious.         OBJECTIVE:     Physical Exam:  Physical Exam  Vitals reviewed.   Constitutional:       General: She is not in acute distress.     Appearance: Normal appearance. She is well-developed.   HENT:      Head: Normocephalic and atraumatic.   Cardiovascular:      Rate and Rhythm: Normal rate and regular rhythm.   Pulmonary:      Effort: Pulmonary effort is normal.   Abdominal:      General: There is no distension.      Palpations: Abdomen is soft.   Genitourinary:     Vagina: Normal.   Skin:     General: Skin is warm.   Neurological:      Mental Status: She is alert and oriented to person, place, and time.   Psychiatric:         Behavior: Behavior normal.         Thought Content: Thought content normal.         Judgment: Judgment normal.           ASSESSMENT:       ICD-10-CM ICD-9-CM    1. Preop examination  Z01.818 V72.84 Full code      Insert peripheral IV      Khan to Roberts      Notify Physician/Vital Signs Parameters Urine output less than 0.5mL/kg/hr (with indwelling catheter) or 30 mL/hr (without indwelling catheter) or blood glucose greater than 200 mg/dL      Notify physician      Notify Physician - Potential Need of Opioid Reversal      Diet NPO      Chlorohexidine Gluconate Bath      Place in Outpatient      Place sequential compression device      Basic  metabolic panel      CBC auto differential      Pregnancy, urine rapid      EKG 12-lead      Full code      Insert peripheral IV      Diet NPO      Place sequential compression device          Orders Placed This Encounter   Procedures    Diet NPO     Standing Status:   Standing     Number of Occurrences:   1    Place sequential compression device     Standing Status:   Standing     Number of Occurrences:   1    Full code     Standing Status:   Standing     Number of Occurrences:   1    Insert peripheral IV     Standing Status:   Standing     Number of Occurrences:   1           Plan:      Preop:  - Patient is to have TLH/BSO/cysto     - Labs: ordered, but not yet obtained  - CXR/EKG: ordered, but not yet obtained  - PAP: Normal  - TVUS as above   - EMBX normal   - Wet prep - n/a  - Medical clearance required: No  - Case request placed & pre-op orders completed  - Anticoagulation : Patient is NOT on antiocoagulation.   - I have discussed the risks, benefits, indications, and alternatives of the procedure in detail.  The patient verbalizes her understanding.  All questions answered.  Consents signed.  The patient agrees to proceed to proceed as planned.  - COUNSELING: Informed patient of complication including bleeding, the potential for injury to bowel, bladder, blood vessel and ureter, risk of thromboembolism was discussed. The possible need for a blood transfusion discussed. The probable need to remove the ovaries was discussed. The potential of uterine or cervical cancer on permanent pathology specimen and need for another procedure was discussed. The possible need to do a repair of the ureter and/or bowel and the need to open the abdomen was discussed. The patient was informed that if the abdomen needed to be opened, that the incision might be a midline not a Pfannenstiel incision. Cervix appeared long enough for brandon cup placement today on exam but if EUA reveals differently will need ZEFERINO. After the pros, cons  risks and benefits were discussed the patient decided to have the surgery and she was consented for the procedures in usual fashion. All questions were answered. The patients's potential pathology was discussed and her theraputic options reviewed. She was informed that there is a possibility that the laparoscopy would need to be convert to an open procedure. She indicated she understood the pros, cons and risks of the procedure and elected to have the surgery. Patient given the opportunity to ask questions, all questions answered.   - elects for ovarian removal. R/b/a discussed  - will continue HRT after   - To pre-op  - meds sent    Twila Villarreal M.D.  Obstetrics and Gynecology

## 2024-10-04 PROBLEM — Z90.710 S/P LAPAROSCOPIC HYSTERECTOMY: Status: ACTIVE | Noted: 2024-10-04

## 2024-10-04 PROBLEM — N93.9 ABNORMAL UTERINE BLEEDING (AUB): Status: ACTIVE | Noted: 2024-10-04

## 2024-10-12 ENCOUNTER — PATIENT MESSAGE (OUTPATIENT)
Dept: OBSTETRICS AND GYNECOLOGY | Facility: CLINIC | Age: 54
End: 2024-10-12
Payer: COMMERCIAL

## 2024-10-15 ENCOUNTER — OFFICE VISIT (OUTPATIENT)
Dept: OBSTETRICS AND GYNECOLOGY | Facility: CLINIC | Age: 54
End: 2024-10-15
Payer: COMMERCIAL

## 2024-10-15 VITALS
HEIGHT: 62 IN | RESPIRATION RATE: 18 BRPM | DIASTOLIC BLOOD PRESSURE: 78 MMHG | BODY MASS INDEX: 21.58 KG/M2 | SYSTOLIC BLOOD PRESSURE: 122 MMHG

## 2024-10-15 DIAGNOSIS — Z90.710 S/P LAPAROSCOPIC HYSTERECTOMY: Primary | ICD-10-CM

## 2024-10-15 DIAGNOSIS — N95.1 MENOPAUSAL SYMPTOMS: ICD-10-CM

## 2024-10-15 PROCEDURE — 99024 POSTOP FOLLOW-UP VISIT: CPT | Mod: S$GLB,,, | Performed by: STUDENT IN AN ORGANIZED HEALTH CARE EDUCATION/TRAINING PROGRAM

## 2024-10-15 PROCEDURE — 99999 PR PBB SHADOW E&M-EST. PATIENT-LVL IV: CPT | Mod: PBBFAC,,, | Performed by: STUDENT IN AN ORGANIZED HEALTH CARE EDUCATION/TRAINING PROGRAM

## 2024-10-15 PROCEDURE — 3074F SYST BP LT 130 MM HG: CPT | Mod: CPTII,S$GLB,, | Performed by: STUDENT IN AN ORGANIZED HEALTH CARE EDUCATION/TRAINING PROGRAM

## 2024-10-15 PROCEDURE — 1159F MED LIST DOCD IN RCRD: CPT | Mod: CPTII,S$GLB,, | Performed by: STUDENT IN AN ORGANIZED HEALTH CARE EDUCATION/TRAINING PROGRAM

## 2024-10-15 PROCEDURE — 3078F DIAST BP <80 MM HG: CPT | Mod: CPTII,S$GLB,, | Performed by: STUDENT IN AN ORGANIZED HEALTH CARE EDUCATION/TRAINING PROGRAM

## 2024-10-15 PROCEDURE — 3044F HG A1C LEVEL LT 7.0%: CPT | Mod: CPTII,S$GLB,, | Performed by: STUDENT IN AN ORGANIZED HEALTH CARE EDUCATION/TRAINING PROGRAM

## 2024-10-15 RX ORDER — ESTRADIOL 0.07 MG/D
1 FILM, EXTENDED RELEASE TRANSDERMAL
Qty: 8 PATCH | Refills: 11 | Status: SHIPPED | OUTPATIENT
Start: 2024-10-17 | End: 2025-10-17

## 2024-10-15 RX ORDER — PROGESTERONE 200 MG/1
200 CAPSULE ORAL NIGHTLY
Qty: 30 CAPSULE | Refills: 11 | Status: SHIPPED | OUTPATIENT
Start: 2024-10-15 | End: 2025-10-15

## 2024-10-15 NOTE — PROGRESS NOTES
History & Physical  Gynecology      SUBJECTIVE:     Chief Complaint: Post-op Evaluation and Medication Refill (Vivelle/ progesterone discussion)       History of Present Illness:    Here today s/p lap hyst/BSO/cysto 2 weeks ago. Had a BM and then pink spotting when wiping over the weekend. Feels better now. Only occasional pink when wiping, nothing to wear a pad. No heavy vaginal bleeding or discharge. Pain ok. Hot flashes a tad worse.    Pathology/findings below:  UTERUS, CERVIX, FALLOPIAN TUBES, AND OVARIES; HYSTERECTOMY AND BILATERAL     SALPINGO-OOPHORECTOMY:      MYOMETRIUM: --INTRAMURAL LEIOMYOMA.     --FOCAL ADENOMYOSIS.      ENDOMETRIUM: --MOSTLY BASAL ENDOMETRIUM.      CERVIX:     --ENDOCERVICAL POLYP (1.7 CENTIMETERS).     --SQUAMOUS METAPLASIA OF ENDOCERVIX.     --NABOTHIAN CYSTS.      RIGHT FALLOPIAN TUBE:     --BENIGN PARATUBAL CYSTS.     --SEROSAL FIBROVASCULAR ADHESIONS.      RIGHT OVARY:     --STROMAL HYPERPLASIA.     --SURFACE EPITHELIAL INCLUSION CYSTS.     --FOCAL ENDOSALPINGIOSIS.     --CORPORA ALBICANTIA.     --SEROSAL FIBROVASCULAR ADHESIONS.      LEFT FALLOPIAN TUBE:     --NO SIGNIFICANT HISTOPATHOLOGIC ABNORMALITY.      LEFT OVARY:     --STROMAL HYPERPLASIA.     --CYSTIC CORPUS LUTEUM.     --CORPORA ALBICANTIA.     1.Grossly normal uterus, tubes and ovaries   2. Cysto revealed no bladder defects. Both UOs noted to efflux.   Review of patient's allergies indicates:   Allergen Reactions    Macrobid  [nitrofurantoin monohyd/m-cryst] Hives and Anaphylaxis     Other reaction(s): Throat closing    Corn Swelling    Effexor  [venlafaxine]      Other reaction(s): dysphagia    Eggs [egg derived] Hives    Wheat Swelling    Doxycycline Itching and Rash       Past Medical History:   Diagnosis Date    Abnormal Pap smear of cervix     Abnormal uterine bleeding (AUB)     Allergic conjunctivitis     ALLERGIC RHINITIS     Atopic dermatitis     Food allergy 09/24/2012    Immunotherapy     Migraine headache       Past Surgical History:   Procedure Laterality Date    BLEPHAROPLASTY Bilateral     CYSTOSCOPY N/A 10/4/2024    Procedure: CYSTOSCOPY;  Surgeon: Twila Villarreal MD;  Location: [facility] OR;  Service: OB/GYN;  Laterality: N/A;    LAPAROSCOPIC SALPINGO-OOPHORECTOMY Bilateral 10/4/2024    Procedure: SALPINGO-OOPHORECTOMY, LAPAROSCOPIC;  Surgeon: Twila Villarreal MD;  Location: [facility] OR;  Service: OB/GYN;  Laterality: Bilateral;    LAPAROSCOPIC TOTAL HYSTERECTOMY N/A 10/4/2024    Procedure: HYSTERECTOMY, TOTAL, LAPAROSCOPIC;  Surgeon: Twila Villarreal MD;  Location: [facility] OR;  Service: OB/GYN;  Laterality: N/A;    SHOULDER ARTHROSCOPY  2011    right rotator cuff repair    SINUS SURGERY  1991    TONSILLECTOMY  childhood     OB History          3    Para   3    Term   3            AB        Living   3         SAB        IAB        Ectopic        Multiple        Live Births   3               Family History   Problem Relation Name Age of Onset    Heart disease Mother  60        s/p cabg    Retinitis pigmentosa Mother      Breast cancer Mother  71    Heart disease Father  62        s/p cabg    Cancer Father          bladder ca (+tob)    Diabetes Father          pre-diabetes    Cancer Maternal Aunt  40        adenoca lung, breast (Non-smoker)    Diabetes Maternal Aunt      Cancer Maternal Grandmother      Cancer Maternal Grandfather          NHL    Allergic rhinitis Son 8 yo     Allergic rhinitis Son 6yo     Asthma Son 6yo     Atopy Son 6yo     Eczema Son 6yo     Immunodeficiency Neg Hx      Urticaria Neg Hx      Angioedema Neg Hx      Allergies Neg Hx      Rhinitis Neg Hx      Ovarian cancer Neg Hx      Colon cancer Neg Hx      Melanoma Neg Hx       Social History     Tobacco Use    Smoking status: Never    Smokeless tobacco: Never   Substance Use Topics    Alcohol use: Yes     Comment: social    Drug use: No       Current Outpatient Medications   Medication Sig    ALTRENO 0.05 % Lotn Apply pea-sized  amount to entire face nightly. Start slow, up-titrate as tolerated.    B-complex with vitamin C (Z-BEC OR EQUIV) tablet Take 1 tablet by mouth once daily.    cetirizine (ZYRTEC) 10 MG tablet Take 10 mg by mouth as needed.     chlorhexidine (PERIDEX) 0.12 % solution Swish 10 ml by mouth for 30 seconds and spit twice daily for 5 days. Start today.    conjugated estrogens (PREMARIN) vaginal cream Place a pea-sized amount in vagina every night for 2 weeks, then use 2-3 nights a week    EPINEPHrine (EPIPEN) 0.3 mg/0.3 mL AtIn Use as directed. Inject into the muscle as needed for anaphylactic reaction.    estradioL (ESTRACE) 0.01 % (0.1 mg/gram) vaginal cream Place 1 g vaginally twice a week.    fish oil-omega-3 fatty acids 300-1,000 mg capsule Take 2 g by mouth once daily.    ibuprofen (ADVIL,MOTRIN) 800 MG tablet Take 1 tablet (800 mg total) by mouth every 6 (six) hours as needed for Pain.    methocarbamoL (ROBAXIN) 750 MG Tab Take 1 tablet (750 mg total) by mouth 4 (four) times daily as needed.    mupirocin (BACTROBAN) 2 % ointment Apply 1 gram to each nostril twice daily for 5 days. Start today    ondansetron (ZOFRAN-ODT) 4 MG TbDL Take 1 tablet (4 mg total) by mouth every 6 (six) hours as needed.    oxyCODONE-acetaminophen (PERCOCET) 5-325 mg per tablet Take 1 tablet by mouth every 4 (four) hours as needed for Pain.    rizatriptan (MAXALT) 10 MG tablet Take 1 tablet by mouth daily as needed for migraine / headaches.    tretinoin (RETIN-A) 0.025 % cream Apply pea-sized amount to entire face nightly. Start slow, up-titrate as tolerated.    vitamin D (VITAMIN D3) 1000 units Tab Take 3,000 Units by mouth once daily.    [START ON 10/17/2024] estradioL (VIVELLE-DOT) 0.075 mg/24 hr Place 1 patch onto the skin twice a week.    progesterone (PROMETRIUM) 200 MG capsule Take 1 capsule (200 mg total) by mouth nightly.    progesterone (PROMETRIUM) 200 MG capsule Take 1 capsule (200 mg total) by mouth every evening.     Current  Facility-Administered Medications   Medication    famotidine tablet 20 mg         Review of Systems:  Review of Systems   Constitutional:  Negative for chills, fatigue and fever.   HENT:  Negative for congestion.    Eyes:  Negative for visual disturbance.   Respiratory:  Negative for cough and shortness of breath.    Cardiovascular:  Negative for chest pain and palpitations.   Gastrointestinal:  Negative for abdominal distention, abdominal pain, constipation, diarrhea, nausea and vomiting.   Genitourinary:  Negative for difficulty urinating, dysuria, hematuria, vaginal bleeding and vaginal discharge.   Skin:  Negative for rash.   Neurological:  Negative for dizziness, seizures, light-headedness and headaches.   Hematological:  Does not bruise/bleed easily.   Psychiatric/Behavioral:  Negative for dysphoric mood. The patient is not nervous/anxious.         OBJECTIVE:     Physical Exam:  Physical Exam  Vitals reviewed.   Constitutional:       General: She is not in acute distress.     Appearance: Normal appearance. She is well-developed.   HENT:      Head: Normocephalic and atraumatic.   Cardiovascular:      Rate and Rhythm: Normal rate and regular rhythm.   Pulmonary:      Effort: Pulmonary effort is normal.   Abdominal:      General: There is no distension.      Palpations: Abdomen is soft.      Comments: Lap sites c/d/I    Genitourinary:     Vagina: Normal.      Comments: Cuff intact, small superficial mucosal separation at midline. Suture still present, peritoneum intact  Skin:     General: Skin is warm.   Neurological:      Mental Status: She is alert and oriented to person, place, and time.   Psychiatric:         Behavior: Behavior normal.         Thought Content: Thought content normal.         Judgment: Judgment normal.           ASSESSMENT:       ICD-10-CM ICD-9-CM    1. S/P laparoscopic hysterectomy  Z90.710 V88.01       2. Menopausal symptoms  N95.1 627.2 estradioL (VIVELLE-DOT) 0.075 mg/24 hr       progesterone (PROMETRIUM) 200 MG capsule          No orders of the defined types were placed in this encounter.          Plan:   - start vaginal estrogen cream nightly  - repeat  exam in 4 weeks    Twila Villarreal M.D.  Obstetrics and Gynecology

## 2024-10-17 ENCOUNTER — CLINICAL SUPPORT (OUTPATIENT)
Dept: OBSTETRICS AND GYNECOLOGY | Facility: CLINIC | Age: 54
End: 2024-10-17
Payer: COMMERCIAL

## 2024-10-17 DIAGNOSIS — R39.9 UTI SYMPTOMS: Primary | ICD-10-CM

## 2024-10-17 LAB
BILIRUBIN, UA POC OHS: NEGATIVE
BLOOD, UA POC OHS: NEGATIVE
CLARITY, UA POC OHS: CLEAR
COLOR, UA POC OHS: YELLOW
GLUCOSE, UA POC OHS: NEGATIVE
KETONES, UA POC OHS: NEGATIVE
LEUKOCYTES, UA POC OHS: ABNORMAL
NITRITE, UA POC OHS: NEGATIVE
PH, UA POC OHS: 5.5
PROTEIN, UA POC OHS: NEGATIVE
SPECIFIC GRAVITY, UA POC OHS: 1.01
UROBILINOGEN, UA POC OHS: 0.2

## 2024-10-17 PROCEDURE — 87086 URINE CULTURE/COLONY COUNT: CPT | Performed by: STUDENT IN AN ORGANIZED HEALTH CARE EDUCATION/TRAINING PROGRAM

## 2024-10-19 LAB
BACTERIA UR CULT: NORMAL
BACTERIA UR CULT: NORMAL

## 2024-10-22 ENCOUNTER — PATIENT MESSAGE (OUTPATIENT)
Dept: OBSTETRICS AND GYNECOLOGY | Facility: CLINIC | Age: 54
End: 2024-10-22
Payer: COMMERCIAL

## 2024-10-22 DIAGNOSIS — M62.89 PELVIC FLOOR INSTABILITY: Primary | ICD-10-CM

## 2024-10-23 RX ORDER — ESTRADIOL 0.1 MG/D
1 FILM, EXTENDED RELEASE TRANSDERMAL
Qty: 8 PATCH | Refills: 11 | Status: SHIPPED | OUTPATIENT
Start: 2024-10-24 | End: 2025-10-24

## 2024-10-24 ENCOUNTER — PATIENT MESSAGE (OUTPATIENT)
Dept: OBSTETRICS AND GYNECOLOGY | Facility: CLINIC | Age: 54
End: 2024-10-24
Payer: COMMERCIAL

## 2024-11-08 ENCOUNTER — PATIENT MESSAGE (OUTPATIENT)
Dept: OBSTETRICS AND GYNECOLOGY | Facility: CLINIC | Age: 54
End: 2024-11-08
Payer: COMMERCIAL

## 2024-11-08 RX ORDER — PRASTERONE 6.5 MG/1
6.5 INSERT VAGINAL NIGHTLY
Qty: 28 EACH | Refills: 11 | Status: SHIPPED | OUTPATIENT
Start: 2024-11-08

## 2024-11-15 ENCOUNTER — OFFICE VISIT (OUTPATIENT)
Dept: OBSTETRICS AND GYNECOLOGY | Facility: CLINIC | Age: 54
End: 2024-11-15
Payer: COMMERCIAL

## 2024-11-15 ENCOUNTER — LAB VISIT (OUTPATIENT)
Dept: LAB | Facility: HOSPITAL | Age: 54
End: 2024-11-15
Attending: STUDENT IN AN ORGANIZED HEALTH CARE EDUCATION/TRAINING PROGRAM
Payer: COMMERCIAL

## 2024-11-15 VITALS
BODY MASS INDEX: 22.11 KG/M2 | DIASTOLIC BLOOD PRESSURE: 84 MMHG | SYSTOLIC BLOOD PRESSURE: 118 MMHG | WEIGHT: 120.13 LBS | HEIGHT: 62 IN

## 2024-11-15 DIAGNOSIS — R23.2 HOT FLASHES: ICD-10-CM

## 2024-11-15 DIAGNOSIS — M62.89 PELVIC FLOOR INSTABILITY: Primary | ICD-10-CM

## 2024-11-15 DIAGNOSIS — N81.6 RECTOCELE: ICD-10-CM

## 2024-11-15 DIAGNOSIS — Z90.710 S/P LAPAROSCOPIC HYSTERECTOMY: ICD-10-CM

## 2024-11-15 LAB — ESTRADIOL SERPL-MCNC: 46 PG/ML

## 2024-11-15 PROCEDURE — 82670 ASSAY OF TOTAL ESTRADIOL: CPT | Performed by: STUDENT IN AN ORGANIZED HEALTH CARE EDUCATION/TRAINING PROGRAM

## 2024-11-15 PROCEDURE — 99999 PR PBB SHADOW E&M-EST. PATIENT-LVL IV: CPT | Mod: PBBFAC,,, | Performed by: STUDENT IN AN ORGANIZED HEALTH CARE EDUCATION/TRAINING PROGRAM

## 2024-11-15 PROCEDURE — 36415 COLL VENOUS BLD VENIPUNCTURE: CPT | Mod: PN | Performed by: STUDENT IN AN ORGANIZED HEALTH CARE EDUCATION/TRAINING PROGRAM

## 2024-11-15 RX ORDER — ESTRADIOL 1 MG/1
1 TABLET ORAL DAILY
Qty: 30 TABLET | Refills: 11 | Status: SHIPPED | OUTPATIENT
Start: 2024-11-15 | End: 2025-11-15

## 2024-11-15 NOTE — PROGRESS NOTES
"History & Physical  Gynecology      SUBJECTIVE:     Chief Complaint: Post-op Evaluation       History of Present Illness:    Here today s/p lap hyst/BSO/cysto 6 weeks ago. Having some pain when sitting. States she now feels a "bulge" in vagina. Difficulty fulling relaxing to go to the bathroom. Sometimes feels like she doesn't empty bladder well.     Hot flashes slightly better but increased in headaches, happened before and had to increase estrogen content on her OCPs. Still taking Prometrium at night for help with sleep.     Pathology/findings below:  UTERUS, CERVIX, FALLOPIAN TUBES, AND OVARIES; HYSTERECTOMY AND BILATERAL     SALPINGO-OOPHORECTOMY:      MYOMETRIUM: --INTRAMURAL LEIOMYOMA.     --FOCAL ADENOMYOSIS.      ENDOMETRIUM: --MOSTLY BASAL ENDOMETRIUM.      CERVIX:     --ENDOCERVICAL POLYP (1.7 CENTIMETERS).     --SQUAMOUS METAPLASIA OF ENDOCERVIX.     --NABOTHIAN CYSTS.      RIGHT FALLOPIAN TUBE:     --BENIGN PARATUBAL CYSTS.     --SEROSAL FIBROVASCULAR ADHESIONS.      RIGHT OVARY:     --STROMAL HYPERPLASIA.     --SURFACE EPITHELIAL INCLUSION CYSTS.     --FOCAL ENDOSALPINGIOSIS.     --CORPORA ALBICANTIA.     --SEROSAL FIBROVASCULAR ADHESIONS.      LEFT FALLOPIAN TUBE:     --NO SIGNIFICANT HISTOPATHOLOGIC ABNORMALITY.      LEFT OVARY:     --STROMAL HYPERPLASIA.     --CYSTIC CORPUS LUTEUM.     --CORPORA ALBICANTIA.     1.Grossly normal uterus, tubes and ovaries   2. Cysto revealed no bladder defects. Both UOs noted to efflux.   Review of patient's allergies indicates:   Allergen Reactions    Macrobid  [nitrofurantoin monohyd/m-cryst] Hives and Anaphylaxis     Other reaction(s): Throat closing    Corn Swelling    Effexor  [venlafaxine]      Other reaction(s): dysphagia    Eggs [egg derived] Hives    Wheat Swelling    Doxycycline Itching and Rash       Past Medical History:   Diagnosis Date    Abnormal Pap smear of cervix     Abnormal uterine bleeding (AUB)     Allergic conjunctivitis     ALLERGIC RHINITIS     " Atopic dermatitis     Food allergy 2012    Immunotherapy     Migraine headache      Past Surgical History:   Procedure Laterality Date    BLEPHAROPLASTY Bilateral     CYSTOSCOPY N/A 10/4/2024    Procedure: CYSTOSCOPY;  Surgeon: Twila Villarreal MD;  Location: [facility] OR;  Service: OB/GYN;  Laterality: N/A;    LAPAROSCOPIC SALPINGO-OOPHORECTOMY Bilateral 10/4/2024    Procedure: SALPINGO-OOPHORECTOMY, LAPAROSCOPIC;  Surgeon: Twila Villarreal MD;  Location: [facility] OR;  Service: OB/GYN;  Laterality: Bilateral;    LAPAROSCOPIC TOTAL HYSTERECTOMY N/A 10/4/2024    Procedure: HYSTERECTOMY, TOTAL, LAPAROSCOPIC;  Surgeon: Twila Villarreal MD;  Location: [facility] OR;  Service: OB/GYN;  Laterality: N/A;    SHOULDER ARTHROSCOPY  2011    right rotator cuff repair    SINUS SURGERY  1991    TONSILLECTOMY  childhood     OB History          3    Para   3    Term   3            AB        Living   3         SAB        IAB        Ectopic        Multiple        Live Births   3               Family History   Problem Relation Name Age of Onset    Heart disease Mother  60        s/p cabg    Retinitis pigmentosa Mother      Breast cancer Mother  71    Heart disease Father  62        s/p cabg    Cancer Father          bladder ca (+tob)    Diabetes Father          pre-diabetes    Cancer Maternal Aunt  40        adenoca lung, breast (Non-smoker)    Diabetes Maternal Aunt      Cancer Maternal Grandmother      Cancer Maternal Grandfather          NHL    Allergic rhinitis Son 8 yo     Allergic rhinitis Son 4yo     Asthma Son 4yo     Atopy Son 4yo     Eczema Son 4yo     Immunodeficiency Neg Hx      Urticaria Neg Hx      Angioedema Neg Hx      Allergies Neg Hx      Rhinitis Neg Hx      Ovarian cancer Neg Hx      Colon cancer Neg Hx      Melanoma Neg Hx       Social History     Tobacco Use    Smoking status: Never    Smokeless tobacco: Never   Substance Use Topics    Alcohol use: Yes     Comment: social    Drug use: No        Current Outpatient Medications   Medication Sig    ALTRENO 0.05 % Lotn Apply pea-sized amount to entire face nightly. Start slow, up-titrate as tolerated.    B-complex with vitamin C (Z-BEC OR EQUIV) tablet Take 1 tablet by mouth once daily.    cetirizine (ZYRTEC) 10 MG tablet Take 10 mg by mouth as needed.     chlorhexidine (PERIDEX) 0.12 % solution Swish 10 ml by mouth for 30 seconds and spit twice daily for 5 days. Start today.    conjugated estrogens (PREMARIN) vaginal cream Place a pea-sized amount in vagina every night for 2 weeks, then use 2-3 nights a week    EPINEPHrine (EPIPEN) 0.3 mg/0.3 mL AtIn Use as directed. Inject into the muscle as needed for anaphylactic reaction.    estradioL (ESTRACE) 0.01 % (0.1 mg/gram) vaginal cream Place 1 g vaginally twice a week.    estradioL (ESTRACE) 1 MG tablet Take 1 tablet (1 mg total) by mouth once daily.    fish oil-omega-3 fatty acids 300-1,000 mg capsule Take 2 g by mouth once daily.    ibuprofen (ADVIL,MOTRIN) 800 MG tablet Take 1 tablet (800 mg total) by mouth every 6 (six) hours as needed for Pain.    methocarbamoL (ROBAXIN) 750 MG Tab Take 1 tablet (750 mg total) by mouth 4 (four) times daily as needed.    mupirocin (BACTROBAN) 2 % ointment Apply 1 gram to each nostril twice daily for 5 days. Start today    ondansetron (ZOFRAN-ODT) 4 MG TbDL Take 1 tablet (4 mg total) by mouth every 6 (six) hours as needed.    oxyCODONE-acetaminophen (PERCOCET) 5-325 mg per tablet Take 1 tablet by mouth every 4 (four) hours as needed for Pain.    prasterone, dhea, (INTRAROSA) 6.5 mg Inst Place 6.5 mg vaginally every evening.    progesterone (PROMETRIUM) 200 MG capsule Take 1 capsule (200 mg total) by mouth nightly.    progesterone (PROMETRIUM) 200 MG capsule Take 1 capsule (200 mg total) by mouth every evening.    rizatriptan (MAXALT) 10 MG tablet Take 1 tablet by mouth daily as needed for migraine / headaches.    tretinoin (RETIN-A) 0.025 % cream Apply pea-sized amount  to entire face nightly. Start slow, up-titrate as tolerated.    vitamin D (VITAMIN D3) 1000 units Tab Take 3,000 Units by mouth once daily.     Current Facility-Administered Medications   Medication    famotidine tablet 20 mg         Review of Systems:  Review of Systems   Constitutional:  Negative for chills, fatigue and fever.   HENT:  Negative for congestion.    Eyes:  Negative for visual disturbance.   Respiratory:  Negative for cough and shortness of breath.    Cardiovascular:  Negative for chest pain and palpitations.   Gastrointestinal:  Negative for abdominal distention, abdominal pain, constipation, diarrhea, nausea and vomiting.   Genitourinary:  Negative for difficulty urinating, dysuria, hematuria, vaginal bleeding and vaginal discharge.   Skin:  Negative for rash.   Neurological:  Negative for dizziness, seizures, light-headedness and headaches.   Hematological:  Does not bruise/bleed easily.   Psychiatric/Behavioral:  Negative for dysphoric mood. The patient is not nervous/anxious.         OBJECTIVE:     Physical Exam:  Physical Exam  Vitals reviewed.   Constitutional:       General: She is not in acute distress.     Appearance: Normal appearance. She is well-developed.   HENT:      Head: Normocephalic and atraumatic.   Cardiovascular:      Rate and Rhythm: Normal rate and regular rhythm.   Pulmonary:      Effort: Pulmonary effort is normal.   Abdominal:      General: There is no distension.      Palpations: Abdomen is soft.      Comments: Lap sites c/d/I    Genitourinary:     Vagina: Normal.      Comments: Cuff intact, well healed.     Significant pelvic floor dysfunction. + clenching buttocks when trying to relax pelvic floor. + hesitancy. Grade 1-2 rectocele when valsalva. Nothing at rest. Cuff high in vault.   Skin:     General: Skin is warm.   Neurological:      Mental Status: She is alert and oriented to person, place, and time.   Psychiatric:         Behavior: Behavior normal.         Thought  Content: Thought content normal.         Judgment: Judgment normal.           ASSESSMENT:       ICD-10-CM ICD-9-CM    1. Pelvic floor instability  M62.89 728.3 Ambulatory referral/consult to Physical/Occupational Therapy      2. Rectocele  N81.6 618.04 Ambulatory referral/consult to Physical/Occupational Therapy      3. Hot flashes  R23.2 782.62 ESTRADIOL      4. S/P laparoscopic hysterectomy  Z90.710 V88.01           Orders Placed This Encounter   Procedures    ESTRADIOL     Standing Status:   Future     Number of Occurrences:   1     Standing Expiration Date:   2/13/2026     Order Specific Question:   Send normal result to authorizing provider's In Basket if patient is active on MyChart:     Answer:   Yes    Ambulatory referral/consult to Physical/Occupational Therapy     Standing Status:   Future     Standing Expiration Date:   12/15/2025     Referral Priority:   Routine     Referral Type:   Physical Medicine     Referral Reason:   Specialty Services Required     Requested Specialty:   Physical Therapy     Number of Visits Requested:   1           Plan:   Menopausal symptoms  -estradiol level  - start oral estrace    Pelvic floor instability  - long discussion with patient and .unusual for rectocele to be appear this close AFTER hysterectomy, likely would have been present before hyst as well. Having significant pelvic floor dysfunction and hesitancy  - pelvic floor PT  - if no improvement with pelvic floor PT, discussed referral to Urogynecologist.     S/p hyst  - well healed  - RTC to normal activities  - pelvic floor rest x 4 more weeks for intercourse for total of 12    Twila Villarreal M.D.  Obstetrics and Gynecology       Twila Villarreal M.D.  Obstetrics and Gynecology

## 2024-11-21 ENCOUNTER — TELEPHONE (OUTPATIENT)
Dept: OBSTETRICS AND GYNECOLOGY | Facility: CLINIC | Age: 54
End: 2024-11-21
Payer: COMMERCIAL

## 2024-11-21 NOTE — TELEPHONE ENCOUNTER
Appointment will be scheduled for patient. Patient verbalized understanding.    ----- Message from Twila Villarreal MD sent at 11/21/2024  3:08 PM CST -----  F/u after pelvic floor PT so in about 3-4 months  ----- Message -----  From: Peggy Thomas LPN  Sent: 11/21/2024   9:26 AM CST  To: Twila Villarreal MD    Did you request patient follow up with you? If so in how long? Patient unsure.  ----- Message -----  From: Marley Camacho  Sent: 11/21/2024   9:05 AM CST  To: Phil SUTHERLAND Staff    Type: Call Back     Who Called:pt     Does the patient know what this is regarding?:Schedule Post Op   Would the patient rather a call back or a response via [facility]? Call   Best Call Back Number: [phone]  Additional Information:

## 2024-12-03 RX ORDER — TRETINOIN 0.25 MG/G
CREAM TOPICAL NIGHTLY
Qty: 45 G | Refills: 5 | Status: SHIPPED | OUTPATIENT
Start: 2024-12-03

## 2024-12-03 RX ORDER — PRASTERONE 6.5 MG/1
6.5 INSERT VAGINAL NIGHTLY
Qty: 28 EACH | Refills: 11 | Status: SHIPPED | OUTPATIENT
Start: 2024-12-03

## 2024-12-03 NOTE — TELEPHONE ENCOUNTER
Refill Routing Note   Medication(s) are not appropriate for processing by [facility] for the following reason(s):        Outside of protocol  Alternate pharmacy has been requested than last order      [facility] action(s):  Route        Medication Therapy Plan: Patient has requested an alternate pharmacy      Appointments  past 12m or future 3m with PCP    Date Provider   Last Visit   11/15/2024 Twila Villarreal MD   Next Visit   2/21/2025 Twila Villarreal MD   ED visits in past 90 days: 0        Note composed:10:53 AM 12/03/2024

## 2024-12-05 ENCOUNTER — TELEPHONE (OUTPATIENT)
Dept: PHARMACY | Facility: CLINIC | Age: 54
End: 2024-12-05

## 2025-02-21 ENCOUNTER — OFFICE VISIT (OUTPATIENT)
Dept: OBSTETRICS AND GYNECOLOGY | Facility: CLINIC | Age: 55
End: 2025-02-21
Payer: COMMERCIAL

## 2025-02-21 VITALS
DIASTOLIC BLOOD PRESSURE: 74 MMHG | WEIGHT: 127.19 LBS | SYSTOLIC BLOOD PRESSURE: 118 MMHG | BODY MASS INDEX: 23.27 KG/M2

## 2025-02-21 DIAGNOSIS — Z79.890 HORMONE REPLACEMENT THERAPY: Primary | ICD-10-CM

## 2025-02-21 DIAGNOSIS — M62.89 PELVIC FLOOR INSTABILITY: ICD-10-CM

## 2025-02-21 PROCEDURE — 3008F BODY MASS INDEX DOCD: CPT | Mod: CPTII,S$GLB,, | Performed by: STUDENT IN AN ORGANIZED HEALTH CARE EDUCATION/TRAINING PROGRAM

## 2025-02-21 PROCEDURE — 1159F MED LIST DOCD IN RCRD: CPT | Mod: CPTII,S$GLB,, | Performed by: STUDENT IN AN ORGANIZED HEALTH CARE EDUCATION/TRAINING PROGRAM

## 2025-02-21 PROCEDURE — 3078F DIAST BP <80 MM HG: CPT | Mod: CPTII,S$GLB,, | Performed by: STUDENT IN AN ORGANIZED HEALTH CARE EDUCATION/TRAINING PROGRAM

## 2025-02-21 PROCEDURE — 99214 OFFICE O/P EST MOD 30 MIN: CPT | Mod: S$GLB,,, | Performed by: STUDENT IN AN ORGANIZED HEALTH CARE EDUCATION/TRAINING PROGRAM

## 2025-02-21 PROCEDURE — 3074F SYST BP LT 130 MM HG: CPT | Mod: CPTII,S$GLB,, | Performed by: STUDENT IN AN ORGANIZED HEALTH CARE EDUCATION/TRAINING PROGRAM

## 2025-02-21 PROCEDURE — 99999 PR PBB SHADOW E&M-EST. PATIENT-LVL IV: CPT | Mod: PBBFAC,,, | Performed by: STUDENT IN AN ORGANIZED HEALTH CARE EDUCATION/TRAINING PROGRAM

## 2025-02-21 RX ORDER — ESTRADIOL 1 MG/G
1 GEL TOPICAL DAILY
Qty: 30 G | Refills: 3 | Status: SHIPPED | OUTPATIENT
Start: 2025-02-21 | End: 2026-02-21

## 2025-02-21 NOTE — PROGRESS NOTES
History & Physical  Gynecology      SUBJECTIVE:     Chief Complaint: Follow-up (Hysterectomy 10/04/2024 )       History of Present Illness:    Here today for f/u. Doing so much better s/p pelvic floor PT. Working well. Going to [facility] next week!    Doesn't like oral HRT, would try something else. Is on compounded testosterone cream from Dr. Yee.   Review of patient's allergies indicates:   Allergen Reactions    Macrobid  [nitrofurantoin monohyd/m-cryst] Hives and Anaphylaxis     Other reaction(s): Throat closing    Corn Swelling    Effexor  [venlafaxine]      Other reaction(s): dysphagia    Eggs [egg derived] Hives    Wheat Swelling    Doxycycline Itching and Rash       Past Medical History:   Diagnosis Date    Abnormal Pap smear of cervix     Abnormal uterine bleeding (AUB)     Allergic conjunctivitis     ALLERGIC RHINITIS     Atopic dermatitis     Food allergy 2012    Immunotherapy     Migraine headache      Past Surgical History:   Procedure Laterality Date    BLEPHAROPLASTY Bilateral     CYSTOSCOPY N/A 10/4/2024    Procedure: CYSTOSCOPY;  Surgeon: Twila Villarreal MD;  Location: [facility];  Service: OB/GYN;  Laterality: N/A;    LAPAROSCOPIC SALPINGO-OOPHORECTOMY Bilateral 10/4/2024    Procedure: SALPINGO-OOPHORECTOMY, LAPAROSCOPIC;  Surgeon: Twila Villarreal MD;  Location: [facility] OR;  Service: OB/GYN;  Laterality: Bilateral;    LAPAROSCOPIC TOTAL HYSTERECTOMY N/A 10/4/2024    Procedure: HYSTERECTOMY, TOTAL, LAPAROSCOPIC;  Surgeon: Twila Villarreal MD;  Location: [facility] OR;  Service: OB/GYN;  Laterality: N/A;    SHOULDER ARTHROSCOPY  2011    right rotator cuff repair    SINUS SURGERY  1991    TONSILLECTOMY  childhood     OB History          3    Para   3    Term   3            AB        Living   3         SAB        IAB        Ectopic        Multiple        Live Births   3               Family History   Problem Relation Name Age of Onset    Cancer Maternal Grandmother       Cancer Maternal Grandfather          NHL    Heart disease Father  62        s/p cabg    Cancer Father          bladder ca (+tob)    Diabetes Father          pre-diabetes    Heart disease Mother  60        s/p cabg    Retinitis pigmentosa Mother      Breast cancer Mother  71    Allergic rhinitis Son 6 yo     Allergic rhinitis Son 6yo     Asthma Son 6yo     Atopy Son 6yo     Eczema Son 6yo     Cancer Maternal Aunt  40        adenoca lung, breast (Non-smoker)    Diabetes Maternal Aunt      Immunodeficiency Neg Hx      Urticaria Neg Hx      Angioedema Neg Hx      Allergies Neg Hx      Rhinitis Neg Hx      Ovarian cancer Neg Hx      Colon cancer Neg Hx      Melanoma Neg Hx      Uterine cancer Neg Hx       Social History     Tobacco Use    Smoking status: Never    Smokeless tobacco: Never   Substance Use Topics    Alcohol use: Yes     Comment: social    Drug use: No       Current Outpatient Medications   Medication Sig    ALTRENO 0.05 % Lotn Apply pea-sized amount to entire face nightly. Start slow, up-titrate as tolerated.    B-complex with vitamin C (Z-BEC OR EQUIV) tablet Take 1 tablet by mouth once daily.    cetirizine (ZYRTEC) 10 MG tablet Take 10 mg by mouth as needed.     conjugated estrogens (PREMARIN) vaginal cream Place a pea-sized amount in vagina every night for 2 weeks, then use 2-3 nights a week    estradioL (ESTRACE) 0.01 % (0.1 mg/gram) vaginal cream Place 1 g vaginally twice a week.    fish oil-omega-3 fatty acids 300-1,000 mg capsule Take 2 g by mouth once daily.    methocarbamoL (ROBAXIN) 750 MG Tab Take 1 tablet (750 mg total) by mouth 4 (four) times daily as needed.    ondansetron (ZOFRAN-ODT) 4 MG TbDL Take 1 tablet (4 mg total) by mouth every 6 (six) hours as needed.    oxyCODONE-acetaminophen (PERCOCET) 5-325 mg per tablet Take 1 tablet by mouth every 4 (four) hours as needed for Pain.    prasterone, dhea, (INTRAROSA) 6.5 mg Inst Place 1 insert (6.5 mg) vaginally every evening.    progesterone  (PROMETRIUM) 200 MG capsule Take 1 capsule (200 mg total) by mouth nightly.    progesterone (PROMETRIUM) 200 MG capsule Take 1 capsule (200 mg total) by mouth every evening.    rizatriptan (MAXALT) 10 MG tablet Take 1 tablet by mouth daily as needed for migraine / headaches.    tretinoin (RETIN-A) 0.025 % cream Apply pea-sized amount to entire face nightly. Start slow, up-titrate as tolerated.    vitamin D (VITAMIN D3) 1000 units Tab Take 3,000 Units by mouth once daily.    EPINEPHrine (EPIPEN) 0.3 mg/0.3 mL AtIn Use as directed. Inject into the muscle as needed for anaphylactic reaction. (Patient not taking: Reported on 2/21/2025)    estradioL (DIVIGEL) 1 mg/gram (0.1 %) topical gel Place 1 g onto the skin once daily.     Current Facility-Administered Medications   Medication    famotidine tablet 20 mg         Review of Systems:  Review of Systems   Constitutional:  Negative for chills, fatigue and fever.   HENT:  Negative for congestion.    Eyes:  Negative for visual disturbance.   Respiratory:  Negative for cough and shortness of breath.    Cardiovascular:  Negative for chest pain and palpitations.   Gastrointestinal:  Negative for abdominal distention, abdominal pain, constipation, diarrhea, nausea and vomiting.   Genitourinary:  Negative for difficulty urinating, dysuria, hematuria, vaginal bleeding and vaginal discharge.   Skin:  Negative for rash.   Neurological:  Negative for dizziness, seizures, light-headedness and headaches.   Hematological:  Does not bruise/bleed easily.   Psychiatric/Behavioral:  Negative for dysphoric mood. The patient is not nervous/anxious.         OBJECTIVE:     Physical Exam:  Physical Exam  Vitals reviewed.   Constitutional:       General: She is not in acute distress.     Appearance: Normal appearance. She is well-developed.   HENT:      Head: Normocephalic and atraumatic.   Cardiovascular:      Rate and Rhythm: Normal rate and regular rhythm.   Pulmonary:      Effort: Pulmonary  effort is normal.   Abdominal:      General: There is no distension.      Palpations: Abdomen is soft.      Comments: Lap sites c/d/I    Genitourinary:     Vagina: Normal.      Comments:    Skin:     General: Skin is warm.   Neurological:      Mental Status: She is alert and oriented to person, place, and time.   Psychiatric:         Behavior: Behavior normal.         Thought Content: Thought content normal.         Judgment: Judgment normal.           ASSESSMENT:       ICD-10-CM ICD-9-CM    1. Hormone replacement therapy  Z79.890 V07.4       2. Pelvic floor instability  M62.89 728.3           No orders of the defined types were placed in this encounter.          Plan:   Menopausal symptoms  - will try divigel, if no improvement, will consider compounded estrogen cream. Wants to avoid oral HRT  - testosterone cream sent to [facility] 4mg/g, 4 clicks a day inner thigh  - continue prometrium for sleep aid       Twila Villarreal M.D.  Obstetrics and Gynecology     Virtual in 6 weeks to see how divigel working

## 2025-03-21 ENCOUNTER — OFFICE VISIT (OUTPATIENT)
Dept: OPTOMETRY | Facility: CLINIC | Age: 55
End: 2025-03-21
Payer: COMMERCIAL

## 2025-03-21 DIAGNOSIS — H11.32 SUBCONJUNCTIVAL HEMORRHAGE OF LEFT EYE: Primary | ICD-10-CM

## 2025-03-21 DIAGNOSIS — S05.02XA ABRASION OF LEFT CONJUNCTIVA, INITIAL ENCOUNTER: ICD-10-CM

## 2025-03-21 PROCEDURE — 99999 PR PBB SHADOW E&M-EST. PATIENT-LVL III: CPT | Mod: PBBFAC,,, | Performed by: OPTOMETRIST

## 2025-03-21 RX ORDER — TOBRAMYCIN 3 MG/ML
1 SOLUTION/ DROPS OPHTHALMIC 3 TIMES DAILY
Qty: 5 ML | Refills: 0 | Status: SHIPPED | OUTPATIENT
Start: 2025-03-21 | End: 2025-04-23

## 2025-03-21 NOTE — PROGRESS NOTES
HPI    LDLE 05/20/2024  Pt states hit her OS w eye liner this am . Had contact in when it happened   .  Not very painful just pressure   Denies F/F   Last edited by Judith Shi on 3/21/2025  3:39 PM.            Assessment /Plan     For exam results, see Encounter Report.    Subconjunctival hemorrhage of left eye    Abrasion of left conjunctiva, initial encounter  -     tobramycin sulfate 0.3% (TOBREX) 0.3 % ophthalmic solution; Place 1 drop into the left eye 3 (three) times daily. for 5 days  Dispense: 5 mL; Refill: 0      1,2. Small, start Tobrex drops 4x/day x 5 days, no contact lens wear, RTC or call if no better next week.

## 2025-03-27 ENCOUNTER — PATIENT MESSAGE (OUTPATIENT)
Dept: OBSTETRICS AND GYNECOLOGY | Facility: CLINIC | Age: 55
End: 2025-03-27
Payer: COMMERCIAL

## 2025-04-02 DIAGNOSIS — Z12.31 OTHER SCREENING MAMMOGRAM: ICD-10-CM

## 2025-04-21 ENCOUNTER — PATIENT MESSAGE (OUTPATIENT)
Dept: OBSTETRICS AND GYNECOLOGY | Facility: CLINIC | Age: 55
End: 2025-04-21
Payer: COMMERCIAL

## 2025-04-21 RX ORDER — ESTRADIOL 0.1 MG/G
1 CREAM VAGINAL
Qty: 8 G | Refills: 11 | Status: CANCELLED | OUTPATIENT
Start: 2025-04-21 | End: 2026-04-21

## 2025-04-22 RX ORDER — ESTRADIOL 1 MG/G
2 GEL TOPICAL DAILY
Qty: 60 G | Refills: 3 | Status: SHIPPED | OUTPATIENT
Start: 2025-04-22 | End: 2026-04-22

## 2025-04-24 ENCOUNTER — PATIENT MESSAGE (OUTPATIENT)
Dept: OBSTETRICS AND GYNECOLOGY | Facility: CLINIC | Age: 55
End: 2025-04-24
Payer: COMMERCIAL

## 2025-04-24 RX ORDER — ESTRADIOL 0.1 MG/G
1 CREAM VAGINAL
Qty: 42.5 G | Refills: 11 | Status: SHIPPED | OUTPATIENT
Start: 2025-04-24 | End: 2026-04-24

## 2025-04-28 RX ORDER — ESTRADIOL 1.25 MG/1.25G
1.25 GEL TOPICAL DAILY
Qty: 37.5 G | Refills: 11 | Status: SHIPPED | OUTPATIENT
Start: 2025-04-28 | End: 2025-07-28

## 2025-05-08 ENCOUNTER — OFFICE VISIT (OUTPATIENT)
Dept: FAMILY MEDICINE | Facility: CLINIC | Age: 55
End: 2025-05-08
Payer: COMMERCIAL

## 2025-05-08 VITALS
DIASTOLIC BLOOD PRESSURE: 70 MMHG | BODY MASS INDEX: 23.19 KG/M2 | OXYGEN SATURATION: 98 % | SYSTOLIC BLOOD PRESSURE: 122 MMHG | WEIGHT: 126.75 LBS | HEART RATE: 80 BPM

## 2025-05-08 DIAGNOSIS — Z00.01 ANNUAL VISIT FOR GENERAL ADULT MEDICAL EXAMINATION WITH ABNORMAL FINDINGS: Primary | ICD-10-CM

## 2025-05-08 DIAGNOSIS — E55.9 VITAMIN D DEFICIENCY: ICD-10-CM

## 2025-05-08 DIAGNOSIS — Z12.31 ENCOUNTER FOR SCREENING MAMMOGRAM FOR MALIGNANT NEOPLASM OF BREAST: ICD-10-CM

## 2025-05-08 DIAGNOSIS — Z78.0 POST-MENOPAUSAL: ICD-10-CM

## 2025-05-08 PROCEDURE — 99999 PR PBB SHADOW E&M-EST. PATIENT-LVL IV: CPT | Mod: PBBFAC,,, | Performed by: FAMILY MEDICINE

## 2025-05-08 PROCEDURE — 3078F DIAST BP <80 MM HG: CPT | Mod: CPTII,S$GLB,, | Performed by: FAMILY MEDICINE

## 2025-05-08 PROCEDURE — 99396 PREV VISIT EST AGE 40-64: CPT | Mod: S$GLB,,, | Performed by: FAMILY MEDICINE

## 2025-05-08 PROCEDURE — 3074F SYST BP LT 130 MM HG: CPT | Mod: CPTII,S$GLB,, | Performed by: FAMILY MEDICINE

## 2025-05-08 PROCEDURE — 3008F BODY MASS INDEX DOCD: CPT | Mod: CPTII,S$GLB,, | Performed by: FAMILY MEDICINE

## 2025-05-08 PROCEDURE — 1159F MED LIST DOCD IN RCRD: CPT | Mod: CPTII,S$GLB,, | Performed by: FAMILY MEDICINE

## 2025-05-08 RX ORDER — ASPIRIN 81 MG/1
81 TABLET ORAL DAILY
COMMUNITY

## 2025-05-11 NOTE — PROGRESS NOTES
Assessment:       1. Annual visit for general adult medical examination with abnormal findings    2. Encounter for screening mammogram for malignant neoplasm of breast    3. Post-menopausal    4. Vitamin D deficiency        Plan:       Annual visit for general adult medical examination with abnormal findings  -     Comprehensive Metabolic Panel; Future; Expected date: 05/08/2025  -     Lipid Panel; Future; Expected date: 05/08/2025  -     TSH; Future; Expected date: 05/08/2025  -     Vitamin D; Future; Expected date: 05/08/2025  -     T4, Free; Future; Expected date: 05/08/2025  -     T3; Future; Expected date: 05/08/2025  -     CBC Without Differential; Future; Expected date: 05/08/2025    Encounter for screening mammogram for malignant neoplasm of breast  -     Mammo Digital Screening Bilat w/ Isaac (XPD); Future; Expected date: 05/08/2025    Post-menopausal  -     DXA Bone Density Axial Skeleton 1 or more sites; Future; Expected date: 05/08/2025    Vitamin D deficiency: Stable  -     Vitamin D; Future; Expected date: 05/08/2025         Continue exercising, continue eating healthy.  Will send a message when we have the results of the test.   Patient agreed with assessment and plan. Patient verbalized understanding.     Subjective:       Patient ID: Maria L Smyth is a 54 y.o. female.    Chief Complaint: Annual Exam    HPI    The patient is here today for an annual examination.  The patient denies any symptoms of chest pain shortness for breath.  The patient has vitamin-D deficiency but is taking vitamin-D and the last time that the levels were checked were in good range.  The patient usually exercises and eat healthy.  She is due for a mammogram, she agreed to have an order so she can schedule.  She also would like to check for osteoporosis.  She denies any symptoms of chest pain, shortness for breath, nausea, vomiting, abdominal pain, diarrhea and constipation.    Past medical history, past social history was  reviewed and discussed with the patient.    Review of Systems    Objective:      Physical Exam  Constitutional:       Appearance: Normal appearance. She is normal weight.   HENT:      Head: Normocephalic and atraumatic.      Nose: Nose normal.   Eyes:      Conjunctiva/sclera: Conjunctivae normal.      Pupils: Pupils are equal, round, and reactive to light.   Cardiovascular:      Rate and Rhythm: Normal rate and regular rhythm.   Pulmonary:      Effort: Pulmonary effort is normal. No respiratory distress.      Breath sounds: No wheezing.   Abdominal:      General: Abdomen is flat. Bowel sounds are normal. There is no distension.      Tenderness: There is no abdominal tenderness.   Musculoskeletal:      Right lower leg: No edema.      Left lower leg: No edema.   Skin:     Findings: No bruising.   Neurological:      Mental Status: She is alert.   Psychiatric:         Mood and Affect: Mood normal.         Behavior: Behavior normal.         Thought Content: Thought content normal.         Judgment: Judgment normal.

## 2025-05-13 ENCOUNTER — LAB VISIT (OUTPATIENT)
Dept: LAB | Facility: HOSPITAL | Age: 55
End: 2025-05-13
Attending: FAMILY MEDICINE
Payer: COMMERCIAL

## 2025-05-13 DIAGNOSIS — Z00.01 ANNUAL VISIT FOR GENERAL ADULT MEDICAL EXAMINATION WITH ABNORMAL FINDINGS: ICD-10-CM

## 2025-05-13 DIAGNOSIS — E55.9 VITAMIN D DEFICIENCY: ICD-10-CM

## 2025-05-13 LAB
25(OH)D3+25(OH)D2 SERPL-MCNC: 45 NG/ML (ref 30–96)
ALBUMIN SERPL BCP-MCNC: 3.9 G/DL (ref 3.5–5.2)
ALP SERPL-CCNC: 65 UNIT/L (ref 40–150)
ALT SERPL W/O P-5'-P-CCNC: 25 UNIT/L (ref 10–44)
ANION GAP (OHS): 9 MMOL/L (ref 8–16)
AST SERPL-CCNC: 23 UNIT/L (ref 11–45)
BILIRUB SERPL-MCNC: 0.5 MG/DL (ref 0.1–1)
BUN SERPL-MCNC: 15 MG/DL (ref 6–20)
CALCIUM SERPL-MCNC: 9.2 MG/DL (ref 8.7–10.5)
CHLORIDE SERPL-SCNC: 106 MMOL/L (ref 95–110)
CHOLEST SERPL-MCNC: 295 MG/DL (ref 120–199)
CHOLEST/HDLC SERPL: 3.2 {RATIO} (ref 2–5)
CO2 SERPL-SCNC: 25 MMOL/L (ref 23–29)
CREAT SERPL-MCNC: 0.7 MG/DL (ref 0.5–1.4)
ERYTHROCYTE [DISTWIDTH] IN BLOOD BY AUTOMATED COUNT: 13.2 % (ref 11.5–14.5)
GFR SERPLBLD CREATININE-BSD FMLA CKD-EPI: >60 ML/MIN/1.73/M2
GLUCOSE SERPL-MCNC: 90 MG/DL (ref 70–110)
HCT VFR BLD AUTO: 45.2 % (ref 37–48.5)
HDLC SERPL-MCNC: 91 MG/DL (ref 40–75)
HDLC SERPL: 30.8 % (ref 20–50)
HGB BLD-MCNC: 14.7 GM/DL (ref 12–16)
LDLC SERPL CALC-MCNC: 186.6 MG/DL (ref 63–159)
MCH RBC QN AUTO: 30.1 PG (ref 27–31)
MCHC RBC AUTO-ENTMCNC: 32.5 G/DL (ref 32–36)
MCV RBC AUTO: 92 FL (ref 82–98)
NONHDLC SERPL-MCNC: 204 MG/DL
PLATELET # BLD AUTO: 321 K/UL (ref 150–450)
PMV BLD AUTO: 9.3 FL (ref 9.2–12.9)
POTASSIUM SERPL-SCNC: 4.5 MMOL/L (ref 3.5–5.1)
PROT SERPL-MCNC: 7 GM/DL (ref 6–8.4)
RBC # BLD AUTO: 4.89 M/UL (ref 4–5.4)
SODIUM SERPL-SCNC: 140 MMOL/L (ref 136–145)
T3FREE SERPL-MCNC: 79 NG/DL (ref 60–180)
T4 FREE SERPL-MCNC: 1 NG/DL (ref 0.71–1.51)
TRIGL SERPL-MCNC: 87 MG/DL (ref 30–150)
TSH SERPL-ACNC: 1.68 UIU/ML (ref 0.4–4)
WBC # BLD AUTO: 4.54 K/UL (ref 3.9–12.7)

## 2025-05-13 PROCEDURE — 82306 VITAMIN D 25 HYDROXY: CPT

## 2025-05-13 PROCEDURE — 84439 ASSAY OF FREE THYROXINE: CPT

## 2025-05-13 PROCEDURE — 80061 LIPID PANEL: CPT

## 2025-05-13 PROCEDURE — 85027 COMPLETE CBC AUTOMATED: CPT

## 2025-05-13 PROCEDURE — 36415 COLL VENOUS BLD VENIPUNCTURE: CPT | Mod: PN

## 2025-05-13 PROCEDURE — 84480 ASSAY TRIIODOTHYRONINE (T3): CPT

## 2025-05-13 PROCEDURE — 82947 ASSAY GLUCOSE BLOOD QUANT: CPT

## 2025-05-13 PROCEDURE — 84443 ASSAY THYROID STIM HORMONE: CPT

## 2025-05-14 ENCOUNTER — RESULTS FOLLOW-UP (OUTPATIENT)
Dept: FAMILY MEDICINE | Facility: CLINIC | Age: 55
End: 2025-05-14

## 2025-05-16 ENCOUNTER — HOSPITAL ENCOUNTER (OUTPATIENT)
Dept: RADIOLOGY | Facility: HOSPITAL | Age: 55
Discharge: HOME | End: 2025-05-16
Attending: FAMILY MEDICINE
Payer: COMMERCIAL

## 2025-05-16 DIAGNOSIS — Z78.0 POST-MENOPAUSAL: ICD-10-CM

## 2025-05-16 PROCEDURE — 77092 TBS I&R FX RSK QHP: CPT | Mod: ,,, | Performed by: RADIOLOGY

## 2025-05-16 PROCEDURE — 77091 TBS TECHL CALCULATION ONLY: CPT | Mod: PO

## 2025-05-16 PROCEDURE — 77080 DXA BONE DENSITY AXIAL: CPT | Mod: 26,,, | Performed by: RADIOLOGY

## 2025-05-20 ENCOUNTER — RESULTS FOLLOW-UP (OUTPATIENT)
Dept: FAMILY MEDICINE | Facility: CLINIC | Age: 55
End: 2025-05-20

## 2025-07-31 DIAGNOSIS — N95.1 MENOPAUSAL SYMPTOMS: ICD-10-CM

## 2025-07-31 DIAGNOSIS — G43.809 OTHER MIGRAINE WITHOUT STATUS MIGRAINOSUS, NOT INTRACTABLE: ICD-10-CM

## 2025-07-31 RX ORDER — PROGESTERONE 200 MG/1
200 CAPSULE ORAL NIGHTLY
Qty: 90 CAPSULE | Refills: 1 | Status: SHIPPED | OUTPATIENT
Start: 2025-07-31

## 2025-07-31 RX ORDER — RIZATRIPTAN BENZOATE 10 MG/1
TABLET ORAL
Qty: 27 TABLET | Refills: 3 | Status: SHIPPED | OUTPATIENT
Start: 2025-07-31

## 2025-08-01 NOTE — TELEPHONE ENCOUNTER
Refill Decision Note   Maria L Smyth  is requesting a refill authorization.  Brief Assessment and Rationale for Refill:  Approve     Medication Therapy Plan:         Comments:     Note composed:11:58 PM 07/31/2025

## 2025-08-01 NOTE — TELEPHONE ENCOUNTER
Refill Decision Note   Maria L Haja  is requesting a refill authorization.  Brief Assessment and Rationale for Refill:  Approve     Medication Therapy Plan:        Pharmacist review requested: Yes   Extended chart review required: Yes   Comments:     Note composed:11:58 PM 07/31/2025

## 2025-08-01 NOTE — TELEPHONE ENCOUNTER
No care due was identified.  [facility] Embedded Care Due Messages. Reference number: [id number].   7/31/2025 9:14:32 PM CDT